# Patient Record
Sex: MALE | Race: WHITE | NOT HISPANIC OR LATINO | Employment: FULL TIME | URBAN - NONMETROPOLITAN AREA
[De-identification: names, ages, dates, MRNs, and addresses within clinical notes are randomized per-mention and may not be internally consistent; named-entity substitution may affect disease eponyms.]

---

## 2019-02-20 ENCOUNTER — OFFICE VISIT (OUTPATIENT)
Dept: UROLOGY | Facility: CLINIC | Age: 34
End: 2019-02-20

## 2019-02-20 VITALS — BODY MASS INDEX: 25.18 KG/M2 | HEIGHT: 69 IN | WEIGHT: 170 LBS

## 2019-02-20 DIAGNOSIS — Z30.09 ENCOUNTER FOR VASECTOMY COUNSELING: Primary | ICD-10-CM

## 2019-02-20 PROCEDURE — 99202 OFFICE O/P NEW SF 15 MIN: CPT | Performed by: UROLOGY

## 2019-02-20 RX ORDER — ALPRAZOLAM 1 MG/1
TABLET ORAL
Qty: 1 TABLET | Refills: 0 | OUTPATIENT
Start: 2019-02-20 | End: 2020-10-17

## 2019-02-20 NOTE — PROGRESS NOTES
Chief Complaint:          Vasectomy    HPI:   33 y.o. male.  Pt has 3 kids and is interested in vasectomy.  I discussed the risks and benefits of vasectomy.  Risks discussed infection, bleeding, pain and recanalization.  HPI      Past Medical History:      History reviewed. No pertinent past medical history.      Current Meds:     No current outpatient medications on file.     No current facility-administered medications for this visit.         Allergies:      No Known Allergies     Past Surgical History:     History reviewed. No pertinent surgical history.      Social History:     Social History     Socioeconomic History   • Marital status:      Spouse name: Not on file   • Number of children: Not on file   • Years of education: Not on file   • Highest education level: Not on file   Social Needs   • Financial resource strain: Not on file   • Food insecurity - worry: Not on file   • Food insecurity - inability: Not on file   • Transportation needs - medical: Not on file   • Transportation needs - non-medical: Not on file   Occupational History   • Not on file   Tobacco Use   • Smoking status: Never Smoker   • Smokeless tobacco: Current User     Types: Snuff   Substance and Sexual Activity   • Alcohol use: Yes     Comment: Occ.    • Drug use: No   • Sexual activity: Yes   Other Topics Concern   • Not on file   Social History Narrative   • Not on file       Family History:     Family History   Problem Relation Age of Onset   • Graves' disease Mother        Review of Systems:     Review of Systems   Constitutional: Negative for chills, fatigue and fever.   HENT: Negative for congestion, sinus pressure and sneezing.    Respiratory: Negative for choking and shortness of breath.    Cardiovascular: Negative for chest pain and leg swelling.   Gastrointestinal: Negative for abdominal pain, blood in stool, constipation and diarrhea.   Genitourinary: Negative for difficulty urinating, frequency and penile pain.  "  Neurological: Negative for dizziness, seizures, light-headedness and numbness.   Psychiatric/Behavioral: Negative for sleep disturbance.             I have reviewed the follow portions of the patient's history and confirmed they are accurate today:  allergies, current medications, past family history, past medical history, past social history, past surgical history, problem list and ROS  Physical Exam:     Physical Exam   Constitutional: He is oriented to person, place, and time.   HENT:   Head: Normocephalic and atraumatic.   Right Ear: External ear normal.   Left Ear: External ear normal.   Nose: Nose normal.   Mouth/Throat: Oropharynx is clear and moist.   Eyes: Conjunctivae and EOM are normal. Pupils are equal, round, and reactive to light.   Neck: Normal range of motion. Neck supple. No thyromegaly present.   Cardiovascular: Normal rate, regular rhythm, normal heart sounds and intact distal pulses.   No murmur heard.  Pulmonary/Chest: Effort normal and breath sounds normal. No respiratory distress. He has no wheezes. He has no rales. He exhibits no tenderness.   Abdominal: Soft. Bowel sounds are normal. He exhibits no distension and no mass. There is no tenderness. No hernia.   Musculoskeletal: Normal range of motion. He exhibits no edema or tenderness.   Lymphadenopathy:     He has no cervical adenopathy.   Neurological: He is alert and oriented to person, place, and time. No cranial nerve deficit. He exhibits normal muscle tone. Coordination normal.   Skin: Skin is warm. No rash noted.   Psychiatric: He has a normal mood and affect. His behavior is normal. Judgment and thought content normal.   Nursing note and vitals reviewed.      Ht 175.3 cm (69\")   Wt 77.1 kg (170 lb)   BMI 25.10 kg/m²    Procedure:         Assessment:     Encounter Diagnosis   Name Primary?   • Encounter for vasectomy counseling Yes     No orders of the defined types were placed in this encounter.      Plan:   vasectomy    Patient's " Body mass index is 25.1 kg/m². BMI is within normal parameters. No follow-up required..      Counseling was given to patient for the following topics impressions as follows: vasectomy risks. The interim medical history and current results were reviewed.  A treatment plan with follow-up was made for Encounter for vasectomy counseling [Z30.09].  This document has been electronically signed by Javier Pierre MD February 20, 2019 9:49 AM

## 2019-02-25 ENCOUNTER — PRIOR AUTHORIZATION (OUTPATIENT)
Dept: UROLOGY | Facility: CLINIC | Age: 34
End: 2019-02-25

## 2019-03-01 ENCOUNTER — PROCEDURE VISIT (OUTPATIENT)
Dept: UROLOGY | Facility: CLINIC | Age: 34
End: 2019-03-01

## 2019-03-01 VITALS — WEIGHT: 170 LBS | BODY MASS INDEX: 25.18 KG/M2 | HEIGHT: 69 IN

## 2019-03-01 DIAGNOSIS — Z30.2 ENCOUNTER FOR VASECTOMY: Primary | ICD-10-CM

## 2019-03-01 PROCEDURE — 55250 REMOVAL OF SPERM DUCT(S): CPT | Performed by: UROLOGY

## 2019-03-01 RX ORDER — CEPHALEXIN 250 MG/1
250 CAPSULE ORAL 3 TIMES DAILY
Qty: 21 CAPSULE | Refills: 0 | OUTPATIENT
Start: 2019-03-01 | End: 2020-10-17

## 2019-03-01 RX ORDER — HYDROCODONE BITARTRATE AND ACETAMINOPHEN 5; 325 MG/1; MG/1
TABLET ORAL
Qty: 20 TABLET | Refills: 0 | OUTPATIENT
Start: 2019-03-01 | End: 2020-10-17

## 2019-03-01 NOTE — PROGRESS NOTES
Chief Complaint:          vasectomy    HPI:   33 y.o. male.    HPI      Past Medical History:      History reviewed. No pertinent past medical history.      Current Meds:     Current Outpatient Medications   Medication Sig Dispense Refill   • ALPRAZolam (XANAX) 1 MG tablet Bring to office for procedure. Staff will instruct on dose and timing. 1 tablet 0     No current facility-administered medications for this visit.         Allergies:      No Known Allergies     Past Surgical History:     History reviewed. No pertinent surgical history.      Social History:     Social History     Socioeconomic History   • Marital status:      Spouse name: Not on file   • Number of children: Not on file   • Years of education: Not on file   • Highest education level: Not on file   Social Needs   • Financial resource strain: Not on file   • Food insecurity - worry: Not on file   • Food insecurity - inability: Not on file   • Transportation needs - medical: Not on file   • Transportation needs - non-medical: Not on file   Occupational History   • Not on file   Tobacco Use   • Smoking status: Never Smoker   • Smokeless tobacco: Current User     Types: Snuff   Substance and Sexual Activity   • Alcohol use: Yes     Comment: Occ.    • Drug use: No   • Sexual activity: Yes   Other Topics Concern   • Not on file   Social History Narrative   • Not on file       Family History:     Family History   Problem Relation Age of Onset   • Graves' disease Mother        Review of Systems:     Review of Systems   Constitutional: Negative for fatigue.   HENT: Negative for sinus pressure and sinus pain.    Eyes: Negative for pain and redness.   Respiratory: Negative for chest tightness.    Cardiovascular: Negative for chest pain.   Gastrointestinal: Negative for abdominal pain.   Endocrine: Negative for heat intolerance.   Genitourinary: Negative for frequency.   Musculoskeletal: Negative for back pain.   Skin: Negative for rash.  "  Allergic/Immunologic: Negative for food allergies.   Neurological: Negative for headaches.   Hematological: Does not bruise/bleed easily.   Psychiatric/Behavioral: The patient is not nervous/anxious.            Physical Exam    Ht 175.3 cm (69\")   Wt 77.1 kg (170 lb)   BMI 25.10 kg/m²    Procedure:   The procedure's risks: bleeding, infection, chronic testicular pain, and recanalization of the vas were discussed in detail; benefits, and alternatives were discussed with patient.  Written consent was obtained prior to the procedure.  30 minutes prior to the procedure, the patient was pre-medicated with Xanax 1mg.  His scrotum was prepped with Betadine and sterilely draped.  Anesthesia was a mixture of Lidocaine and Marcaine without Epinephrine.  After the patient was prepped and draped in the supine position, a penile scrotal incision in the midline allowed the vas clamp to grab the left vas and bring it up to the wound.  The sharpened hemostat of the [employer] was used to dissect out the vas on the left side.  Clips were applied to the proximal and distal portion of the vas. The left vas specimen had a clip placed for identification. The right vas segment did not have a clip for identification.  3-0 Chromic Suture was used to close the wound.    Assessment:     Encounter Diagnosis   Name Primary?   • Encounter for vasectomy Yes     No orders of the defined types were placed in this encounter.      Plan:   Rx norco and keflex return 8 weeks    This document has been electronically signed by Javier Pierre MD March 1, 2019 1:48 PM  "

## 2019-03-01 NOTE — PATIENT INSTRUCTIONS
Vasectomy, Care After  Refer to this sheet in the next few weeks. These instructions provide you with information on caring for yourself after your procedure. Your health care provider may also give you more specific instructions. Your treatment has been planned according to current medical practices, but problems sometimes occur. Call your health care provider if you have any problems or questions after your procedure.  What can I expect after the procedure?  After your procedure, it is typical to have the following:  · Slight swelling or redness or both at the surgical site.  · Mild pain or discomfort in the scrotum.  · Some oozing of blood from the cuts (incisions) made by the surgeon is normal during the first day or two after the procedure.  · Blood in the ejaculate is common and typically clears after a few days.    Follow these instructions at home:  · Only take over-the-counter or prescription medicines for pain, discomfort, or fever as directed by your health care provider.  · Avoid using nonsteroidal anti-inflammatory drugs (NSAIDs) because these can make bleeding worse.  · Apply ice to the injured area:  ? Put ice in a plastic bag.  ? Place a towel between your skin and the bag.  ? Leave the ice on for 20 minutes, 2-3 times a day.  · Avoid being active for the first 2 days after surgery.  · Wear a supporter while moving around for the first week after surgery. You may add some sterile fluffed bandages or a clean washcloth to the scrotal support if the scrotal support irritates your skin.  · Do not participate in sports or perform heavy physical labor for at least 2 weeks.  · You may have protected intercourse 7-10 days after your procedure. Remember, you are not sterile until follow-up specimens show no sperm in your ejaculate.  · Be sure to follow up with your surgeon as instructed to confirm sterility. It usually requires multiple ejaculations to clear the sperm located beyond the vasectomy site of  blockage. You will need at least two specimens showing an absence of sperm before you can resume unprotected intercourse.  Contact a health care provider if:  · You have redness, swelling, or increasing pain in the wounds or testicles (scrotum).  · You see pus coming from the wound.  · You have a fever.  · You notice a foul smell coming from the wound or dressing.  · You notice a breaking open of the stitches (suture) line or wound edges even after sutures have been removed.  · You have increased bleeding from the wounds.  Get help right away if:  · You develop a rash.  · You have difficulty breathing.  · You have any reaction or side effects to medicines given.  This information is not intended to replace advice given to you by your health care provider. Make sure you discuss any questions you have with your health care provider.  Document Released: 07/07/2006 Document Revised: 05/25/2017 Document Reviewed: 07/07/2014  [employer] Patient Education © 2018 [employer]

## 2019-03-06 ENCOUNTER — OFFICE VISIT (OUTPATIENT)
Dept: UROLOGY | Facility: CLINIC | Age: 34
End: 2019-03-06

## 2019-03-06 VITALS — BODY MASS INDEX: 25.18 KG/M2 | HEIGHT: 69 IN | WEIGHT: 170 LBS

## 2019-03-06 DIAGNOSIS — T14.8XXA HEMATOMA: Primary | ICD-10-CM

## 2019-03-06 PROCEDURE — 99024 POSTOP FOLLOW-UP VISIT: CPT | Performed by: UROLOGY

## 2019-03-06 RX ORDER — OXYCODONE HYDROCHLORIDE AND ACETAMINOPHEN 5; 325 MG/1; MG/1
TABLET ORAL
Qty: 40 TABLET | Refills: 0 | OUTPATIENT
Start: 2019-03-06 | End: 2020-10-17

## 2019-03-06 NOTE — PROGRESS NOTES
Chief Complaint:          Scrotal black and blue and swelling after vascectomy    HPI:   33 y.o. male.  The patient complains of scrotal swelling in black and blue changes going up into the groin.  Was worse over the weekend but is started going down he has a fair amount of discomfort.  HPI      Past Medical History:      History reviewed. No pertinent past medical history.      Current Meds:     Current Outpatient Medications   Medication Sig Dispense Refill   • ALPRAZolam (XANAX) 1 MG tablet Bring to office for procedure. Staff will instruct on dose and timing. 1 tablet 0   • cephalexin (KEFLEX) 250 MG capsule Take 1 capsule by mouth 3 (Three) Times a Day. 21 capsule 0   • HYDROcodone-acetaminophen (NORCO) 5-325 MG per tablet 1 to 2 Tablets Every 6 Hours as Needed for PAIN 20 tablet 0   • oxyCODONE-acetaminophen (PERCOCET) 5-325 MG per tablet 1 to 2 Tablets Every 6 Hours as needed for PAIN 40 tablet 0     No current facility-administered medications for this visit.         Allergies:      No Known Allergies     Past Surgical History:     History reviewed. No pertinent surgical history.      Social History:     Social History     Socioeconomic History   • Marital status:      Spouse name: Not on file   • Number of children: Not on file   • Years of education: Not on file   • Highest education level: Not on file   Social Needs   • Financial resource strain: Not on file   • Food insecurity - worry: Not on file   • Food insecurity - inability: Not on file   • Transportation needs - medical: Not on file   • Transportation needs - non-medical: Not on file   Occupational History   • Not on file   Tobacco Use   • Smoking status: Never Smoker   • Smokeless tobacco: Current User     Types: Snuff   Substance and Sexual Activity   • Alcohol use: Yes     Comment: Occ.    • Drug use: No   • Sexual activity: Yes   Other Topics Concern   • Not on file   Social History Narrative   • Not on file       Family History:  "    Family History   Problem Relation Age of Onset   • Graves' disease Mother        Review of Systems:     Review of Systems   Constitutional: Negative for chills, fatigue and fever.   HENT: Negative for sinus pressure and sinus pain.    Respiratory: Negative for cough.    Cardiovascular: Negative for leg swelling.   Gastrointestinal: Negative for abdominal pain, constipation and diarrhea.   Genitourinary: Positive for scrotal swelling, testicular pain and urgency. Negative for difficulty urinating and frequency.   Musculoskeletal: Negative for back pain.   Allergic/Immunologic: Negative for food allergies.   Neurological: Negative for headaches.   Psychiatric/Behavioral: The patient is nervous/anxious.          Physical Exam:     Physical Exam   Genitourinary:   Genitourinary Comments: The patient has a bilateral scrotal hematoma that is soft but tender and the ecchymosis is going up into the left groin.       Ht 175.3 cm (69\")   Wt 77.1 kg (170 lb)   BMI 25.10 kg/m²    Procedure:         Assessment:     Encounter Diagnosis   Name Primary?   • Hematoma Yes     No orders of the defined types were placed in this encounter.      Plan:   I think the patient has bilateral scrotal hematoma status post vasectomy this should resolve with conservative therapy.  I have written a prescription for Percocet to help with his pain management and he should do ice and elevation.  See him in a month provided that he continues to improve.  This document has been electronically signed by Javier Pierre MD March 6, 2019 2:23 PM  "

## 2019-03-19 ENCOUNTER — OFFICE VISIT (OUTPATIENT)
Dept: UROLOGY | Facility: CLINIC | Age: 34
End: 2019-03-19

## 2019-03-19 VITALS — HEIGHT: 69 IN | BODY MASS INDEX: 25.18 KG/M2 | WEIGHT: 170 LBS

## 2019-03-19 DIAGNOSIS — T14.8XXA HEMATOMA: Primary | ICD-10-CM

## 2019-03-19 PROCEDURE — 99024 POSTOP FOLLOW-UP VISIT: CPT | Performed by: UROLOGY

## 2019-03-19 RX ORDER — ACETAMINOPHEN 325 MG/1
325 TABLET ORAL EVERY 6 HOURS PRN
COMMUNITY
End: 2020-10-17

## 2019-03-19 RX ORDER — HYDROCODONE BITARTRATE AND ACETAMINOPHEN 5; 325 MG/1; MG/1
TABLET ORAL
Qty: 20 TABLET | Refills: 0 | OUTPATIENT
Start: 2019-03-19 | End: 2020-10-17

## 2019-03-19 NOTE — PROGRESS NOTES
Chief Complaint:          Post vasectomy hematome    HPI:   33 y.o. male.  Pt has improved considerably since he had surgery 2 weeks ago.  Last time we saw him he had soft ball size scrotal hematoma particularly extending into the left groin.  Now his swelling is about half that size but is still painful whenever he walks or stands.    HPI      Past Medical History:      History reviewed. No pertinent past medical history.      Current Meds:     Current Outpatient Medications   Medication Sig Dispense Refill   • acetaminophen (TYLENOL) 325 MG tablet Take 325 mg by mouth Every 6 (Six) Hours As Needed for Mild Pain  or Moderate Pain .     • Polyethylene Glycol 3350 (MIRALAX PO) Take  by mouth.     • ALPRAZolam (XANAX) 1 MG tablet Bring to office for procedure. Staff will instruct on dose and timing. 1 tablet 0   • cephalexin (KEFLEX) 250 MG capsule Take 1 capsule by mouth 3 (Three) Times a Day. 21 capsule 0   • HYDROcodone-acetaminophen (NORCO) 5-325 MG per tablet 1 to 2 Tablets Every 6 Hours as Needed for PAIN 20 tablet 0   • oxyCODONE-acetaminophen (PERCOCET) 5-325 MG per tablet 1 to 2 Tablets Every 6 Hours as needed for PAIN 40 tablet 0     No current facility-administered medications for this visit.         Allergies:      No Known Allergies     Past Surgical History:     History reviewed. No pertinent surgical history.      Social History:     Social History     Socioeconomic History   • Marital status:      Spouse name: Not on file   • Number of children: Not on file   • Years of education: Not on file   • Highest education level: Not on file   Social Needs   • Financial resource strain: Not on file   • Food insecurity - worry: Not on file   • Food insecurity - inability: Not on file   • Transportation needs - medical: Not on file   • Transportation needs - non-medical: Not on file   Occupational History   • Not on file   Tobacco Use   • Smoking status: Never Smoker   • Smokeless tobacco: Current User      Types: Snuff   Substance and Sexual Activity   • Alcohol use: Yes     Comment: Occ.    • Drug use: No   • Sexual activity: Yes   Other Topics Concern   • Not on file   Social History Narrative   • Not on file       Family History:     Family History   Problem Relation Age of Onset   • Graves' disease Mother        Review of Systems:     Review of Systems   Constitutional: Negative for chills, fatigue and fever.   HENT: Negative for sinus pressure and sinus pain.    Gastrointestinal: Negative for abdominal pain, constipation and diarrhea.   Genitourinary: Positive for scrotal swelling and testicular pain. Negative for decreased urine volume, dysuria, flank pain and frequency.   Musculoskeletal: Negative for back pain.   Neurological: Negative for headaches.   Psychiatric/Behavioral: The patient is not nervous/anxious.          Physical Exam:     Physical Exam   Constitutional: He is oriented to person, place, and time.   HENT:   Head: Normocephalic and atraumatic.   Right Ear: External ear normal.   Left Ear: External ear normal.   Nose: Nose normal.   Mouth/Throat: Oropharynx is clear and moist.   Eyes: Conjunctivae and EOM are normal. Pupils are equal, round, and reactive to light.   Neck: Normal range of motion. Neck supple. No thyromegaly present.   Cardiovascular: Normal rate, regular rhythm, normal heart sounds and intact distal pulses.   No murmur heard.  Pulmonary/Chest: Effort normal and breath sounds normal. No respiratory distress. He has no wheezes. He has no rales. He exhibits no tenderness.   Abdominal: Soft. Bowel sounds are normal. He exhibits no distension and no mass. There is no tenderness. No hernia.   Genitourinary: Rectum normal, prostate normal and penis normal.   Genitourinary Comments: Pt has about half the size of his maximal scrotal hematoma.   Musculoskeletal: Normal range of motion. He exhibits no edema or tenderness.   Lymphadenopathy:     He has no cervical adenopathy.   Neurological:  "He is alert and oriented to person, place, and time. No cranial nerve deficit. He exhibits normal muscle tone. Coordination normal.   Skin: Skin is warm. No rash noted.   Psychiatric: He has a normal mood and affect. His behavior is normal. Judgment and thought content normal.   Nursing note and vitals reviewed.      Ht 175.3 cm (69\")   Wt 77.1 kg (170 lb)   BMI 25.10 kg/m²    Procedure:         Assessment:     Encounter Diagnosis   Name Primary?   • Hematoma Yes     No orders of the defined types were placed in this encounter.      Plan:   Pt and I discussed how he has a legitimate cause for pain with his hematoma but we have to be careful about getting habituated to the pain medication.  He and I agree that he will use it sparingly.  Will see him again in 6 weeks.  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     Patient's Body mass index is 25.1 kg/m². BMI is within normal parameters. No follow-up required..      Counseling was given to patient for the following topics instructions for management as follows: hematome. The interim medical history and current results were reviewed.  A treatment plan with follow-up was made for Hematoma [T14.8XXA].  This document has been electronically signed by Javier Pierre MD March 19, 2019 10:05 AM  "

## 2019-11-19 PROCEDURE — 84153 ASSAY OF PSA TOTAL: CPT | Performed by: UROLOGY

## 2019-11-19 PROCEDURE — 82670 ASSAY OF TOTAL ESTRADIOL: CPT | Performed by: UROLOGY

## 2019-11-19 PROCEDURE — 85027 COMPLETE CBC AUTOMATED: CPT | Performed by: UROLOGY

## 2019-11-19 PROCEDURE — 84403 ASSAY OF TOTAL TESTOSTERONE: CPT | Performed by: UROLOGY

## 2021-03-18 ENCOUNTER — BULK ORDERING (OUTPATIENT)
Dept: CASE MANAGEMENT | Facility: OTHER | Age: 36
End: 2021-03-18

## 2021-03-18 DIAGNOSIS — Z23 IMMUNIZATION DUE: ICD-10-CM

## 2023-02-01 ENCOUNTER — TRANSCRIBE ORDERS (OUTPATIENT)
Dept: PHYSICAL THERAPY | Facility: CLINIC | Age: 38
End: 2023-02-01
Payer: OTHER GOVERNMENT

## 2023-02-01 DIAGNOSIS — M54.50 LOW BACK PAIN, UNSPECIFIED BACK PAIN LATERALITY, UNSPECIFIED CHRONICITY, UNSPECIFIED WHETHER SCIATICA PRESENT: Primary | ICD-10-CM

## 2023-02-13 ENCOUNTER — TREATMENT (OUTPATIENT)
Dept: PHYSICAL THERAPY | Facility: CLINIC | Age: 38
End: 2023-02-13
Payer: OTHER GOVERNMENT

## 2023-02-13 DIAGNOSIS — G89.29 CHRONIC RIGHT SHOULDER PAIN: ICD-10-CM

## 2023-02-13 DIAGNOSIS — M25.511 CHRONIC RIGHT SHOULDER PAIN: ICD-10-CM

## 2023-02-13 DIAGNOSIS — M54.41 CHRONIC BILATERAL LOW BACK PAIN WITH RIGHT-SIDED SCIATICA: Primary | ICD-10-CM

## 2023-02-13 DIAGNOSIS — G89.29 CHRONIC BILATERAL LOW BACK PAIN WITH RIGHT-SIDED SCIATICA: Primary | ICD-10-CM

## 2023-02-13 PROCEDURE — 97162 PT EVAL MOD COMPLEX 30 MIN: CPT | Performed by: PHYSICAL THERAPIST

## 2023-02-13 NOTE — PROGRESS NOTES
"    Physical Therapy Initial Evaluation and Plan of Care    Patient: Teo Griffith   : 1985  Diagnosis/ICD-10 Code:  Chronic bilateral low back pain with right-sided sciatica [M54.41, G89.29]  Referring practitioner: Alexis Sanchez MD  Date of Initial Visit: 2023  Today's Date: 2023  Patient seen for 1 session         Visit Diagnoses:    ICD-10-CM ICD-9-CM   1. Chronic bilateral low back pain with right-sided sciatica  M54.41 724.2    G89.29 724.3     338.29   2. Chronic right shoulder pain  M25.511 719.41    G89.29 338.29         Subjective Questionnaire: Oswestry: 52%; Quick Dash 36%      Subjective Evaluation    History of Present Illness  Onset date: 2022.  Mechanism of injury: While performing a ACFT test in 2022,  the patient was throwing a 10 pound medicine ball overhead that resulted in a right shoulder injury.  The patient experienced a \"pop\" that included immediate pain and weakness in the shoulder.  The patient later received an MRI of the shoulder that demonstrated a tear.  The patient was refererred to [facility] in [place], [place], most recently, and was given a cortisone shot in the shoulder with poor response.   During the same ACFT test in 2022, the patient strained his back while performing a dead lift.  The patient received an MRI of the lower back that demonstrated four disc bulges in the lumbar spine. The patient has now been referred to therapy for treatment of pain.      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;  transportation Quality of life: good    Pain  Current pain ratin  At best pain rating: 3  At worst pain ratin  Location: back and right shoulder  Quality: dull ache, needle-like and sharp  Relieving factors: rest, medications and ice  Aggravating factors: lifting, movement, overhead activity, ambulation, outstretched reach, repetitive movement and squatting  Progression: no change    Hand dominance: right    Diagnostic Tests  MRI studies: " abnormal    Patient Goals  Patient goals for therapy: decreased pain, increased motion, increased strength, independence with ADLs/IADLs, return to sport/leisure activities and return to work             Objective          Postural Observations    Additional Postural Observation Details  Left list noted; increased wb on left  Decreased lumbar lordosis  Elevated right shoulder  No winging    Palpation     Right Tenderness of the supraspinatus.     Additional Palpation Details  Tender along T7 and L1 region; right glut    Neurological Testing     Sensation     Shoulder   Left Shoulder   Intact: light touch    Right Shoulder   Intact: light touch    Lumbar   Left   Intact: light touch    Right   Intact: light touch    Reflexes   Left   Patellar (L4): normal (2+)    Right   Patellar (L4): normal (2+)    Active Range of Motion   Left Shoulder   Flexion: WFL  Abduction: WFL    Right Shoulder   Flexion: 135 degrees   Abduction: 110 degrees   External rotation 90°: 30 degrees  Internal rotation 90°: 30 degrees     Lumbar   Flexion: 50 degrees   Extension: 50 degrees   Left rotation: 75 degrees   Right rotation: 75 degrees     Strength/Myotome Testing     Left Shoulder     Planes of Motion   Flexion: 4+   Abduction: 4+   External rotation at 0°: 4+   Internal rotation at 0°: 4+     Isolated Muscles   Biceps: 4+   Triceps: 4+     Right Shoulder     Planes of Motion   Flexion: 4-   Abduction: 4-   External rotation at 0°: 4-   Internal rotation at 0°: 4+     Isolated Muscles   Biceps: 4+   Triceps: 4-     Left Hip   Planes of Motion   Flexion: 4+    Right Hip   Planes of Motion   Flexion: 4-    Left Knee   Flexion: 4+  Extension: 4+    Right Knee   Flexion: 4-  Extension: 4-    Tests     Right Shoulder   Positive empty can, Hawkin's and Speed's.   Negative anterior load and shift and lift-off.     Additional Tests Details  Positive right slump test          Assessment & Plan     Assessment  Impairments: abnormal muscle firing,  abnormal or restricted ROM, activity intolerance, impaired physical strength, lacks appropriate home exercise program and pain with function  Functional Limitations: carrying objects, lifting, sleeping, pulling, pushing, uncomfortable because of pain, stooping, reaching behind back and reaching overhead  Assessment details: Pt is a 38 y/o male referred to therapy for treatment of back and right shoulder pain.  Pt presents with symptoms consistent with a posterior lumbar disc derangement and a right rotator cuff injury.  Therapy will follow for improved centralization of symptoms and decreased pain.  Prognosis: good    Goals  Plan Goals: STG 6 weeks    1 Pt will be instructed in a HEP.  2 Pt will report a 50% decrease in radicular symptoms.  3 Pt will demonstrate 160 degree of arom shoulder flexion.    LTG 12 weeks    1 Pt will improve his Mod Oswestry to less than 20%.  2 Pt will report back pain no greater than 3/10 with moderate lifting.  3 Pt will demonstrate 4/5 right shoulder gross strength.  4 Pt will improve his Quick Dash score to less than 15%.    Plan  Therapy options: will be seen for skilled therapy services  Planned modality interventions: cryotherapy, TENS, thermotherapy (hydrocollator packs), traction and ultrasound  Planned therapy interventions: abdominal trunk stabilization, ADL retraining, body mechanics training, flexibility, functional ROM exercises, home exercise program, IADL retraining, joint mobilization, manual therapy, neuromuscular re-education, postural training, soft tissue mobilization, strengthening, stretching and therapeutic activities  Frequency: 2x week  Duration in weeks: 8  Treatment plan discussed with: patient  Plan details: Will follow for optimal gains.  Moderate Evaluation  59215  Re-evaluation   07503  Therapeutic exercise  53570  Therapeutic activity    24473  Neuromuscular re-education   86751  Manual therapy   49123  Gait training  22007  Unattended e-stim  (Medicaid/Medicare)     Moist heat/cryotherapy 68813   Ultrasound   34538  Mechanical traction 90204            Timed:         Manual Therapy:         mins  41305;     Therapeutic Exercise:         mins  21795;     Neuromuscular Ruth:        mins  24871;    Therapeutic Activity:          mins  62242;     Gait Training:           mins  62392;     Ultrasound:          mins  66207;    Ionto                                   mins   92347  Self Care                            mins   49252  Canalith Repos         mins 25176      Un-Timed:  Electrical Stimulation:         mins  08188 ( );  Dry Needling          mins self-pay  Traction          mins 78451  Low Eval          Mins  45019  Mod Eval     40     Mins  45162  High Eval                            Mins  25078        Timed Treatment:      mins   Total Treatment:     40   mins          PT: Tim Jackson PT     License Number: [id number]  Electronically signed by Tim Jackson PT, 02/13/23, 11:12 AM EST    Certification Period: 2/13/2023 thru 5/13/2023  I certify that the therapy services are furnished while this patient is under my care.  The services outlined above are required by this patient, and will be reviewed every 90 days.         Physician Signature:__________________________________________________    PHYSICIAN: Alexis Sanchez MD  NPI: [id number]                                      DATE:      Please sign and return via fax to [email or web address] . Thank you, [facility].

## 2023-02-15 ENCOUNTER — TREATMENT (OUTPATIENT)
Dept: PHYSICAL THERAPY | Facility: CLINIC | Age: 38
End: 2023-02-15
Payer: OTHER GOVERNMENT

## 2023-02-15 DIAGNOSIS — M54.41 CHRONIC BILATERAL LOW BACK PAIN WITH RIGHT-SIDED SCIATICA: Primary | ICD-10-CM

## 2023-02-15 DIAGNOSIS — M25.511 CHRONIC RIGHT SHOULDER PAIN: ICD-10-CM

## 2023-02-15 DIAGNOSIS — G89.29 CHRONIC BILATERAL LOW BACK PAIN WITH RIGHT-SIDED SCIATICA: Primary | ICD-10-CM

## 2023-02-15 DIAGNOSIS — G89.29 CHRONIC RIGHT SHOULDER PAIN: ICD-10-CM

## 2023-02-15 PROCEDURE — 97110 THERAPEUTIC EXERCISES: CPT | Performed by: PHYSICAL THERAPIST

## 2023-02-15 PROCEDURE — G0283 ELEC STIM OTHER THAN WOUND: HCPCS | Performed by: PHYSICAL THERAPIST

## 2023-02-15 PROCEDURE — 97140 MANUAL THERAPY 1/> REGIONS: CPT | Performed by: PHYSICAL THERAPIST

## 2023-02-15 NOTE — PROGRESS NOTES
Physical Therapy Daily Treatment Note      Patient: Teo Griffith   : 1985  Referring practitioner: Alexis Sanchez MD  Date of Initial Visit: Type: THERAPY  Noted: 2023  Today's Date: 2/15/2023  Patient seen for 2 sessions       Visit Diagnoses:    ICD-10-CM ICD-9-CM   1. Chronic bilateral low back pain with right-sided sciatica  M54.41 724.2    G89.29 724.3     338.29   2. Chronic right shoulder pain  M25.511 719.41    G89.29 338.29       Subjective Evaluation    History of Present Illness    Subjective comment: Pt has 5/10 back pain today.       Objective   See Exercise, Manual, and Modality Logs for complete treatment.       Assessment & Plan     Assessment    Assessment details: Tx today consisted of exercises for core stability and centralization of symptoms; followed by stm and PA glides to lumbar region and ended with ice and estim to right shoulder for decreased pain.  Pt required cues for postural TB exercises.  Pt did report 3/10 post pain today.    Plan  Plan details: Will follow progressing core stability and improved functional mobility.          Timed:         Manual Therapy:    12     mins  62587;     Therapeutic Exercise:    27     mins  40873;     Neuromuscular Ruth:        mins  61436;    Therapeutic Activity:          mins  00187;     Gait Training:           mins  66785;     Ultrasound:          mins  77152;    Ionto                                   mins   92713  Self Care                            mins   47459  Canalith Repos         mins 49969      Un-Timed:  Electrical Stimulation:    10     mins  96331 ( );  Dry Needling          mins self-pay  Traction          mins 59280      Timed Treatment:   39   mins   Total Treatment:     49   mins    Tim Jackson PT  [place] License: [id number]      Electronically signed by Tim Jackson PT, 02/15/23, 10:04 AM EST

## 2023-02-20 ENCOUNTER — TREATMENT (OUTPATIENT)
Dept: PHYSICAL THERAPY | Facility: CLINIC | Age: 38
End: 2023-02-20
Payer: OTHER GOVERNMENT

## 2023-02-20 DIAGNOSIS — G89.29 CHRONIC BILATERAL LOW BACK PAIN WITH RIGHT-SIDED SCIATICA: Primary | ICD-10-CM

## 2023-02-20 DIAGNOSIS — M54.41 CHRONIC BILATERAL LOW BACK PAIN WITH RIGHT-SIDED SCIATICA: Primary | ICD-10-CM

## 2023-02-20 DIAGNOSIS — G89.29 CHRONIC RIGHT SHOULDER PAIN: ICD-10-CM

## 2023-02-20 DIAGNOSIS — M25.511 CHRONIC RIGHT SHOULDER PAIN: ICD-10-CM

## 2023-02-20 PROCEDURE — G0283 ELEC STIM OTHER THAN WOUND: HCPCS | Performed by: PHYSICAL THERAPIST

## 2023-02-20 PROCEDURE — 97110 THERAPEUTIC EXERCISES: CPT | Performed by: PHYSICAL THERAPIST

## 2023-02-20 PROCEDURE — 97140 MANUAL THERAPY 1/> REGIONS: CPT | Performed by: PHYSICAL THERAPIST

## 2023-02-20 NOTE — PROGRESS NOTES
Physical Therapy Daily Treatment Note      Patient: Teo Griffith   : 1985  Referring practitioner: Alexis Sanchez MD  Date of Initial Visit: Type: THERAPY  Noted: 2023  Today's Date: 2023  Patient seen for 3 sessions       Visit Diagnoses:    ICD-10-CM ICD-9-CM   1. Chronic bilateral low back pain with right-sided sciatica  M54.41 724.2    G89.29 724.3     338.29   2. Chronic right shoulder pain  M25.511 719.41    G89.29 338.29       Subjective Evaluation    History of Present Illness    Subjective comment: The patient sprained his left ankle last Wednesday.  The patient reports having 3/10 pain  in back and right shoulder today.       Objective   See Exercise, Manual, and Modality Logs for complete treatment.       Assessment & Plan     Assessment    Assessment details: Tx today consisted of shoulder and lumbar mobility and stability exercises increased to 25 reps; followed by prom of right shoulder; stm to lower back and ended with ice and e-stim to right shoulder.  Pt reported 3/10 post pain and responded well to added reps.    Plan  Plan details: Will follow progressing core stability and decreased pain.          Timed:         Manual Therapy:   12      mins  84037;     Therapeutic Exercise:    33     mins  46189;     Neuromuscular Ruth:        mins  54819;    Therapeutic Activity:          mins  39025;     Gait Training:           mins  54673;     Ultrasound:          mins  57103;    Ionto                                   mins   94069  Self Care                            mins   72810  Canalith Repos         mins 91610      Un-Timed:  Electrical Stimulation:    10     mins  94556 ( );  Dry Needling          mins self-pay  Traction          mins 25037      Timed Treatment:   45   mins   Total Treatment:     55   mins    Tim Jackson PT  [place] License: [id number]      Electronically signed by Tim Jackson PT, 23, 10:09 AM EST

## 2023-02-22 ENCOUNTER — TREATMENT (OUTPATIENT)
Dept: PHYSICAL THERAPY | Facility: CLINIC | Age: 38
End: 2023-02-22
Payer: OTHER GOVERNMENT

## 2023-02-22 DIAGNOSIS — M25.511 CHRONIC RIGHT SHOULDER PAIN: ICD-10-CM

## 2023-02-22 DIAGNOSIS — G89.29 CHRONIC BILATERAL LOW BACK PAIN WITH RIGHT-SIDED SCIATICA: Primary | ICD-10-CM

## 2023-02-22 DIAGNOSIS — M54.41 CHRONIC BILATERAL LOW BACK PAIN WITH RIGHT-SIDED SCIATICA: Primary | ICD-10-CM

## 2023-02-22 DIAGNOSIS — G89.29 CHRONIC RIGHT SHOULDER PAIN: ICD-10-CM

## 2023-02-22 PROCEDURE — 97140 MANUAL THERAPY 1/> REGIONS: CPT | Performed by: PHYSICAL THERAPIST

## 2023-02-22 PROCEDURE — G0283 ELEC STIM OTHER THAN WOUND: HCPCS | Performed by: PHYSICAL THERAPIST

## 2023-02-22 PROCEDURE — 97110 THERAPEUTIC EXERCISES: CPT | Performed by: PHYSICAL THERAPIST

## 2023-02-22 NOTE — PROGRESS NOTES
Physical Therapy Daily Treatment Note      Patient: Teo Griffith   : 1985  Referring practitioner: Alexis Sanchez MD  Date of Initial Visit: Type: THERAPY  Noted: 2023  Today's Date: 2023  Patient seen for 4 sessions       Visit Diagnoses:    ICD-10-CM ICD-9-CM   1. Chronic bilateral low back pain with right-sided sciatica  M54.41 724.2    G89.29 724.3     338.29   2. Chronic right shoulder pain  M25.511 719.41    G89.29 338.29       Subjective Evaluation    History of Present Illness    Subjective comment: Pt has 4/10 pain today.  The patient is scheduled for back injections next week.       Objective   See Exercise, Manual, and Modality Logs for complete treatment.       Assessment & Plan     Assessment    Assessment details: Tx today consisted of exercises progressed with increased reps; followed by prom to right shld; stm to lower back in prone and ended with ice and estim to right shld and ice to back.  Pt responded well to added reps and added lat rows today, yet reported similar post pain.  Pt reports walking differently on sprained ankle may contribute to back pain.    Plan  Plan details: Will follow progressing core stability and decreased pain.          Timed:         Manual Therapy:    12     mins  82150;     Therapeutic Exercise:    33     mins  58811;     Neuromuscular Ruth:        mins  44213;    Therapeutic Activity:          mins  14005;     Gait Training:           mins  37941;     Ultrasound:          mins  20753;    Ionto                                   mins   90356  Self Care                            mins   77839  Canalith Repos         mins 99271      Un-Timed:  Electrical Stimulation:    10     mins  77401 ( );  Dry Needling          mins self-pay  Traction          mins 42178      Timed Treatment:   45   mins   Total Treatment:     55   mins    Tim Jackson PT  [place] License: [id number]      Electronically signed by Tim Jackson PT, 23, 9:12  AM EST

## 2023-02-27 ENCOUNTER — TREATMENT (OUTPATIENT)
Dept: PHYSICAL THERAPY | Facility: CLINIC | Age: 38
End: 2023-02-27
Payer: OTHER GOVERNMENT

## 2023-02-27 DIAGNOSIS — G89.29 CHRONIC RIGHT SHOULDER PAIN: ICD-10-CM

## 2023-02-27 DIAGNOSIS — G89.29 CHRONIC BILATERAL LOW BACK PAIN WITH RIGHT-SIDED SCIATICA: Primary | ICD-10-CM

## 2023-02-27 DIAGNOSIS — M54.41 CHRONIC BILATERAL LOW BACK PAIN WITH RIGHT-SIDED SCIATICA: Primary | ICD-10-CM

## 2023-02-27 DIAGNOSIS — M25.511 CHRONIC RIGHT SHOULDER PAIN: ICD-10-CM

## 2023-02-27 PROCEDURE — 97110 THERAPEUTIC EXERCISES: CPT | Performed by: PHYSICAL THERAPIST

## 2023-02-27 PROCEDURE — 97140 MANUAL THERAPY 1/> REGIONS: CPT | Performed by: PHYSICAL THERAPIST

## 2023-02-27 PROCEDURE — G0283 ELEC STIM OTHER THAN WOUND: HCPCS | Performed by: PHYSICAL THERAPIST

## 2023-02-27 NOTE — PROGRESS NOTES
Physical Therapy Daily Treatment Note      Patient: Teo Griffith   : 1985  Referring practitioner: Alexis Sanchez MD  Date of Initial Visit: Type: THERAPY  Noted: 2023  Today's Date: 2023  Patient seen for 5 sessions       Visit Diagnoses:    ICD-10-CM ICD-9-CM   1. Chronic bilateral low back pain with right-sided sciatica  M54.41 724.2    G89.29 724.3     338.29   2. Chronic right shoulder pain  M25.511 719.41    G89.29 338.29       Subjective Evaluation    History of Present Illness    Subjective comment: Pt reports having 3/10 pain today.       Objective   See Exercise, Manual, and Modality Logs for complete treatment.       Assessment & Plan     Assessment    Assessment details: Tx today consisted of postural and shoulder exercises; followed by lumbar exercises for centralization; stm to lower back for decreased pain and ended with ice and estim for decreased pain.  Pt reported similar post pain today and demonstrated no distress with exercises.      Plan  Plan details: Will follow progressing centralization of symptoms and decreased pain in back and shoulder.          Timed:         Manual Therapy:    12     mins  19312;     Therapeutic Exercise:    31     mins  03141;     Neuromuscular Ruth:        mins  52091;    Therapeutic Activity:          mins  95368;     Gait Training:           mins  23329;     Ultrasound:          mins  47030;    Ionto                                   mins   91520  Self Care                            mins   40014  Canalith Repos         mins 78990      Un-Timed:  Electrical Stimulation:    10     mins  99056 ( );  Dry Needling          mins self-pay  Traction          mins 72982      Timed Treatment:   43   mins   Total Treatment:     53   mins    Tim Jackson PT  [place] License: [id number]      Electronically signed by Tim Jackson PT, 23, 10:03 AM EST

## 2023-03-01 ENCOUNTER — TREATMENT (OUTPATIENT)
Dept: PHYSICAL THERAPY | Facility: CLINIC | Age: 38
End: 2023-03-01
Payer: OTHER GOVERNMENT

## 2023-03-01 DIAGNOSIS — M25.511 CHRONIC RIGHT SHOULDER PAIN: ICD-10-CM

## 2023-03-01 DIAGNOSIS — G89.29 CHRONIC RIGHT SHOULDER PAIN: ICD-10-CM

## 2023-03-01 DIAGNOSIS — M54.41 CHRONIC BILATERAL LOW BACK PAIN WITH RIGHT-SIDED SCIATICA: Primary | ICD-10-CM

## 2023-03-01 DIAGNOSIS — G89.29 CHRONIC BILATERAL LOW BACK PAIN WITH RIGHT-SIDED SCIATICA: Primary | ICD-10-CM

## 2023-03-01 PROCEDURE — 97140 MANUAL THERAPY 1/> REGIONS: CPT | Performed by: PHYSICAL THERAPIST

## 2023-03-01 PROCEDURE — G0283 ELEC STIM OTHER THAN WOUND: HCPCS | Performed by: PHYSICAL THERAPIST

## 2023-03-01 PROCEDURE — 97110 THERAPEUTIC EXERCISES: CPT | Performed by: PHYSICAL THERAPIST

## 2023-03-01 NOTE — PROGRESS NOTES
Physical Therapy Daily Treatment Note      Patient: Teo Griffith   : 1985  Referring practitioner: Alexis Sanchez MD  Date of Initial Visit: Type: THERAPY  Noted: 2023  Today's Date: 3/1/2023  Patient seen for 6 sessions       Visit Diagnoses:    ICD-10-CM ICD-9-CM   1. Chronic bilateral low back pain with right-sided sciatica  M54.41 724.2    G89.29 724.3     338.29   2. Chronic right shoulder pain  M25.511 719.41    G89.29 338.29       Subjective Evaluation    History of Present Illness    Subjective comment: Pt reports having 3/10 shoulder and 5/10 back pain.       Objective   See Exercise, Manual, and Modality Logs for complete treatment.       Assessment & Plan     Assessment    Assessment details: Tx today consisted of exercises for shoulder mobility, postural staiblity and centralization of symptoms and core stability; followed by stm to lower back and ended with mh and estim to right shld and mh to lower back.  Pt demonstrated good effort and reported no change in pain post tx.    Plan  Plan details: Will follow progressing mobility with decreased pain.          Timed:         Manual Therapy:    12     mins  57155;     Therapeutic Exercise:    33     mins  16078;     Neuromuscular Ruth:        mins  36395;    Therapeutic Activity:          mins  99170;     Gait Training:           mins  29625;     Ultrasound:          mins  54202;    Ionto                                   mins   75500  Self Care                            mins   70010  Canalith Repos         mins 10465      Un-Timed:  Electrical Stimulation:    10     mins  73317 ( );  Dry Needling          mins self-pay  Traction          mins 32943      Timed Treatment: 45     mins   Total Treatment:     55   mins    Tim Jackson PT  [place] License: [id number]      Electronically signed by Tim Jackson PT, 23, 10:09 AM EST

## 2023-03-06 ENCOUNTER — TREATMENT (OUTPATIENT)
Dept: PHYSICAL THERAPY | Facility: CLINIC | Age: 38
End: 2023-03-06
Payer: OTHER GOVERNMENT

## 2023-03-06 DIAGNOSIS — M25.511 CHRONIC RIGHT SHOULDER PAIN: ICD-10-CM

## 2023-03-06 DIAGNOSIS — G89.29 CHRONIC BILATERAL LOW BACK PAIN WITH RIGHT-SIDED SCIATICA: Primary | ICD-10-CM

## 2023-03-06 DIAGNOSIS — G89.29 CHRONIC RIGHT SHOULDER PAIN: ICD-10-CM

## 2023-03-06 DIAGNOSIS — M54.41 CHRONIC BILATERAL LOW BACK PAIN WITH RIGHT-SIDED SCIATICA: Primary | ICD-10-CM

## 2023-03-06 PROCEDURE — 97110 THERAPEUTIC EXERCISES: CPT | Performed by: PHYSICAL THERAPIST

## 2023-03-06 PROCEDURE — G0283 ELEC STIM OTHER THAN WOUND: HCPCS | Performed by: PHYSICAL THERAPIST

## 2023-03-06 PROCEDURE — 97140 MANUAL THERAPY 1/> REGIONS: CPT | Performed by: PHYSICAL THERAPIST

## 2023-03-06 NOTE — PROGRESS NOTES
Physical Therapy Daily Treatment Note    Patient: Teo Griffith   : 1985  Diagnosis/ICD-10 Code:  Chronic bilateral low back pain with right-sided sciatica [M54.41, G89.29]  Referring practitioner: Alexis Sanchez MD  Date of Initial Visit: Type: THERAPY  Noted: 2023  Today's Date: 3/6/2023  Patient seen for 7 sessions           Subjective Pt arrives today and reports increased pain in back 6/10 due to riding in car with family on a trip for 3.5 hours this weekend.  He states he is having increased right LE radicular pain and his leg gives away at times.  He reports 3/10 pain in shoulder.      Objective   See Exercise, Manual, and Modality Logs for complete treatment.       Assessment details: Tx today consisted of exercises for shoulder mobility as well as lumbar exercises for postural staiblity and centralization of symptoms and core stability; Today added prone lying activities with pillow under belly and progression to prone without pillow and reported pain centralized to back.  Prone lying followed by stm to lower back and ended with ice and estim to lower back.  Pt demonstrated good effort and reported slight decrease in pain post session to 5/10 back.    Plan  Plan details: Will follow progressing mobility with decreased pain.            Timed:                                                                           Manual Therapy:            12     mins  43083;                  Therapeutic Exercise:    35     mins  37932;     Neuromuscular Ruth:        mins  62460;    Therapeutic Activity:           mins  38140;     Gait Training:                      mins  76178;     Ultrasound:                          mins  91486;    Ionto                                   mins   30374  Self Care                            mins   70766  Canalith Repos                   mins 30573        Un-Timed:  Electrical Stimulation:    10     mins  51234 ( );  Dry Needling                       mins  self-pay  Traction                               mins 14538       Timed Treatment:   47  mins   Total Treatment:     57   mins    Progress strengthening /stabilization /functional activity           Electronically signed by:  Betsy Jackson, PT  Physical Therapist  [place] [id number]    Referring MD:  Alexis Sanchez MD  [address] [place] [address],  [place] [address]

## 2023-03-08 ENCOUNTER — TREATMENT (OUTPATIENT)
Dept: PHYSICAL THERAPY | Facility: CLINIC | Age: 38
End: 2023-03-08
Payer: OTHER GOVERNMENT

## 2023-03-08 DIAGNOSIS — M54.41 CHRONIC BILATERAL LOW BACK PAIN WITH RIGHT-SIDED SCIATICA: Primary | ICD-10-CM

## 2023-03-08 DIAGNOSIS — G89.29 CHRONIC BILATERAL LOW BACK PAIN WITH RIGHT-SIDED SCIATICA: Primary | ICD-10-CM

## 2023-03-08 DIAGNOSIS — M25.511 CHRONIC RIGHT SHOULDER PAIN: ICD-10-CM

## 2023-03-08 DIAGNOSIS — G89.29 CHRONIC RIGHT SHOULDER PAIN: ICD-10-CM

## 2023-03-08 PROCEDURE — 97110 THERAPEUTIC EXERCISES: CPT | Performed by: PHYSICAL THERAPIST

## 2023-03-08 PROCEDURE — 97140 MANUAL THERAPY 1/> REGIONS: CPT | Performed by: PHYSICAL THERAPIST

## 2023-03-08 PROCEDURE — G0283 ELEC STIM OTHER THAN WOUND: HCPCS | Performed by: PHYSICAL THERAPIST

## 2023-03-08 NOTE — PROGRESS NOTES
Physical Therapy Daily Treatment Note      Patient: Teo Griffith   : 1985  Referring practitioner: Alexis Sanchez MD  Date of Initial Visit: Type: THERAPY  Noted: 2023  Today's Date: 3/8/2023  Patient seen for 8 sessions       Visit Diagnoses:    ICD-10-CM ICD-9-CM   1. Chronic bilateral low back pain with right-sided sciatica  M54.41 724.2    G89.29 724.3     338.29   2. Chronic right shoulder pain  M25.511 719.41    G89.29 338.29       Subjective Evaluation    History of Present Illness    Subjective comment: Pt has 3/10 back and 2/10 shoulder pain today.       Objective   See Exercise, Manual, and Modality Logs for complete treatment.       Assessment & Plan     Assessment    Assessment details: Tx today consisted of exercises for improved shoulder mobility and stability; postural and core stability and centralization of symptoms; followed by stm to lower back and ended with ice and estim to shoulder and mh to back. Pt had 3/10 shld and 2/10 back pian post tx.    Plan  Plan details: Will follow progressing stability and decreased pain.          Timed:         Manual Therapy:    12     mins  09769;     Therapeutic Exercise:    27     mins  85686;     Neuromuscular Ruth:        mins  50122;    Therapeutic Activity:          mins  82761;     Gait Training:           mins  62799;     Ultrasound:          mins  93324;    Ionto                                   mins   97454  Self Care                            mins   98217  Canalith Repos         mins 47885      Un-Timed:  Electrical Stimulation:    10     mins  81805 ( );  Dry Needling          mins self-pay  Traction          mins 12056      Timed Treatment:   39   mins   Total Treatment:     49   mins    Tim Jackson PT  [place] License: [id number]      Electronically signed by Tim Jackson PT, 23, 10:07 AM EST

## 2023-03-13 ENCOUNTER — TREATMENT (OUTPATIENT)
Dept: PHYSICAL THERAPY | Facility: CLINIC | Age: 38
End: 2023-03-13
Payer: OTHER GOVERNMENT

## 2023-03-13 DIAGNOSIS — G89.29 CHRONIC BILATERAL LOW BACK PAIN WITH RIGHT-SIDED SCIATICA: Primary | ICD-10-CM

## 2023-03-13 DIAGNOSIS — M54.41 CHRONIC BILATERAL LOW BACK PAIN WITH RIGHT-SIDED SCIATICA: Primary | ICD-10-CM

## 2023-03-13 DIAGNOSIS — G89.29 CHRONIC RIGHT SHOULDER PAIN: ICD-10-CM

## 2023-03-13 DIAGNOSIS — M25.511 CHRONIC RIGHT SHOULDER PAIN: ICD-10-CM

## 2023-03-13 PROCEDURE — 97110 THERAPEUTIC EXERCISES: CPT | Performed by: PHYSICAL THERAPIST

## 2023-03-13 PROCEDURE — 97140 MANUAL THERAPY 1/> REGIONS: CPT | Performed by: PHYSICAL THERAPIST

## 2023-03-13 PROCEDURE — G0283 ELEC STIM OTHER THAN WOUND: HCPCS | Performed by: PHYSICAL THERAPIST

## 2023-03-13 NOTE — PROGRESS NOTES
Physical Therapy Re Certification Of Plan of Care  Patient: Teo Griffith   : 1985  Diagnosis/ICD-10 Code:  Chronic bilateral low back pain with right-sided sciatica [M54.41, G89.29]  Referring practitioner: Alexis Sanchez MD  Date of Initial Visit: Type: THERAPY  Noted: 2023  Today's Date: 3/13/2023  Patient seen for 9 sessions         Visit Diagnoses:    ICD-10-CM ICD-9-CM   1. Chronic bilateral low back pain with right-sided sciatica  M54.41 724.2    G89.29 724.3     338.29   2. Chronic right shoulder pain  M25.511 719.41    G89.29 338.29         Teo Griffith reports:   Subjective Questionnaire: QuickDASH: 39%  Clinical Progress: improved  Home Program Compliance: Yes  Treatment has included: therapeutic exercise, neuromuscular re-education, manual therapy, therapeutic activity, gait training, electrical stimulation, ultrasound, moist heat and cryotherapy      Subjective Evaluation    History of Present Illness    Subjective comment: Pt will follow up with his referring MD in two weeks.  Pain  Current pain rating: 3  At best pain ratin  At worst pain ratin  Location: back and right shoulder             Objective          Active Range of Motion   Cervical/Thoracic Spine     Thoracic   Extension: 50 degrees     Right Shoulder   Flexion: 140 degrees   Abduction: 120 degrees   External rotation 45°: 40 degrees   Internal rotation 45°: 35 degrees     Lumbar   Flexion: 50 degrees   Left rotation: 75 degrees   Right rotation: 75 degrees     Strength/Myotome Testing     Right Shoulder     Planes of Motion   Flexion: 4-   Abduction: 4-   External rotation at 0°: 4-   Internal rotation at 0°: 4-     Isolated Muscles   Biceps: 4+   Triceps: 4+     Left Hip   Planes of Motion   Flexion: 4+    Right Hip   Planes of Motion   Flexion: 4-    Left Knee   Flexion: 4+  Extension: 4+    Right Knee   Flexion: 4-  Extension: 4-          Assessment & Plan     Assessment  Impairments: abnormal muscle  firing, abnormal or restricted ROM, activity intolerance, impaired physical strength, lacks appropriate home exercise program and pain with function  Functional Limitations: carrying objects, lifting, sleeping, pulling, pushing, uncomfortable because of pain, stooping, reaching behind back and reaching overhead  Assessment details: Pt is a 36 y/o male referred to therapy for treatment of back and right shoulder pain.  Pt has attended 9 sessions with good effort.  Pt has noted mild gains with an improved Quick Dash score to 39% from 52%.  Pt also had mild gains with shoulder ROM and reports not having radicular pain below the knee for the past two weeks.  Pt responded well to tx with reports of pain at 3/10. Due to patient's continued reports of radicular symptoms, patient may benefit from a consult from a neurosurgeon.  Pt was also instructed that a consult to an orthopedic MD may be needed for further examination and treatment for the shoulder.  Prognosis: good    Goals  Plan Goals: STG 6 weeks    1 Pt will be instructed in a HEP.met  2 Pt will report a 50% decrease in radicular symptoms.partially met  3 Pt will demonstrate 160 degree of arom shoulder flexion.not met    LTG 12 weeks    1 Pt will improve his Mod Oswestry to less than 20%.NT  2 Pt will report back pain no greater than 3/10 with moderate lifting.not met  3 Pt will demonstrate 4/5 right shoulder gross strength.not met  4 Pt will improve his Quick Dash score to less than 15%.not met    Plan  Therapy options: will be seen for skilled therapy services  Planned modality interventions: cryotherapy, TENS, thermotherapy (hydrocollator packs), traction and ultrasound  Planned therapy interventions: abdominal trunk stabilization, ADL retraining, body mechanics training, flexibility, functional ROM exercises, home exercise program, IADL retraining, joint mobilization, manual therapy, neuromuscular re-education, postural training, soft tissue mobilization,  strengthening, stretching and therapeutic activities  Frequency: 2x week  Duration in weeks: 4  Treatment plan discussed with: patient  Plan details: Will follow for optimal gains.  Moderate Evaluation  21076  Re-evaluation   78902  Therapeutic exercise  08150  Therapeutic activity    85096  Neuromuscular re-education   46422  Manual therapy   70170  Gait training  18441  Unattended e-stim (Medicaid/Medicare)     Moist heat/cryotherapy 86312   Ultrasound   30737  Mechanical traction 52349             Recommendations: Continue as planned  Timeframe: 1 month  Prognosis to achieve goals: good      Timed:         Manual Therapy:    12     mins  81902;     Therapeutic Exercise:    31     mins  31919;     Neuromuscular Ruth:        mins  20575;    Therapeutic Activity:          mins  36724;     Gait Training:           mins  68175;     Ultrasound:          mins  89598;    Ionto                                   mins   41530  Self Care                            mins   78897  Canalith Repos         mins 77677      Un-Timed:  Electrical Stimulation:    10     mins  27908 ( );  Dry Needling          mins self-pay  Traction          mins 99690  Re-Eval                               mins  82481      Timed Treatment:   43   mins   Total Treatment:     53   mins          PT: Tim Jackson PT     [place] License:  [id number]    Electronically signed by Tim Jackson PT, 03/13/23, 10:08 AM EDT    Certification Period: 3/13/2023 thru 6/10/2023  I certify that the therapy services are furnished while this patient is under my care.  The services outlined above are required by this patient, and will be reviewed every 90 days.         Physician Signature:__________________________________________________    PHYSICIAN: Alexis Sanchez MD  NPI: [id number]                                      DATE:  :     Please sign and return via fax to [email or web address] . Thank you, [facility]

## 2023-03-15 ENCOUNTER — TELEPHONE (OUTPATIENT)
Dept: PHYSICAL THERAPY | Facility: CLINIC | Age: 38
End: 2023-03-15

## 2023-03-20 ENCOUNTER — TREATMENT (OUTPATIENT)
Dept: PHYSICAL THERAPY | Facility: CLINIC | Age: 38
End: 2023-03-20
Payer: OTHER GOVERNMENT

## 2023-03-20 DIAGNOSIS — G89.29 CHRONIC BILATERAL LOW BACK PAIN WITH RIGHT-SIDED SCIATICA: Primary | ICD-10-CM

## 2023-03-20 DIAGNOSIS — M25.511 CHRONIC RIGHT SHOULDER PAIN: ICD-10-CM

## 2023-03-20 DIAGNOSIS — M54.41 CHRONIC BILATERAL LOW BACK PAIN WITH RIGHT-SIDED SCIATICA: Primary | ICD-10-CM

## 2023-03-20 DIAGNOSIS — G89.29 CHRONIC RIGHT SHOULDER PAIN: ICD-10-CM

## 2023-03-20 PROCEDURE — 97140 MANUAL THERAPY 1/> REGIONS: CPT | Performed by: PHYSICAL THERAPIST

## 2023-03-20 PROCEDURE — G0283 ELEC STIM OTHER THAN WOUND: HCPCS | Performed by: PHYSICAL THERAPIST

## 2023-03-20 PROCEDURE — 97110 THERAPEUTIC EXERCISES: CPT | Performed by: PHYSICAL THERAPIST

## 2023-03-20 NOTE — PROGRESS NOTES
Physical Therapy Daily Treatment Note      Patient: Toe Griffith   : 1985  Referring practitioner: Alexis Sanchez MD  Date of Initial Visit: Type: THERAPY  Noted: 2023  Today's Date: 3/20/2023  Patient seen for 10 sessions       Visit Diagnoses:    ICD-10-CM ICD-9-CM   1. Chronic bilateral low back pain with right-sided sciatica  M54.41 724.2    G89.29 724.3     338.29   2. Chronic right shoulder pain  M25.511 719.41    G89.29 338.29       Subjective: Patient reports 2/10 low back and right shoulder pain upon arrival to therapy. Pt states he was unable to make his last appointment due to his child being sick.      Objective   See Exercise, Manual, and Modality Logs for complete treatment.       Assessment/Plan: Patient completed today's session with no changes in pain reported following, 2/10. Treatment consisted of therex as per flow sheet with continued focus on improved lumbar/ shoulder ROM, improved stability and reduced radicular symptoms. Manual therapy and modalities performed at conclusion. Exercise progressed with additional scapular strengthening activities added to program including sternal lifts. Pt observed with good tolerance and was provided with cues and demonstration for form, and for max benefit. Pt noted with tightness bilaterally in thoracic and lumbar paraspinals during manual therapy. Pt continues to benefit from therapy services and will be progressed as tolerated to address goals, reduce pain and restore function. No adverse reactions observed during, and/ or following tx. Continue with PT's POC.       Timed:         Manual Therapy:   15      mins  72882;     Therapeutic Exercise:    36     mins  78280;     Neuromuscular Ruth:        mins  11974;    Therapeutic Activity:          mins  53234;     Gait Training:           mins  59795;     Ultrasound:          mins  88543;    Ionto                                   mins   91003  Self Care                            mins    60332  [facility]         mins 53877      Un-Timed:  Electrical Stimulation:   10      mins  42401 ( );  Dry Needling          mins self-pay  Traction          mins 29828      Timed Treatment:  51    mins   Total Treatment:    61    mins    Natalie Smith. Linda, ELHAM  [place] License: [id number]

## 2023-03-22 ENCOUNTER — TREATMENT (OUTPATIENT)
Dept: PHYSICAL THERAPY | Facility: CLINIC | Age: 38
End: 2023-03-22
Payer: OTHER GOVERNMENT

## 2023-03-22 DIAGNOSIS — G89.29 CHRONIC RIGHT SHOULDER PAIN: ICD-10-CM

## 2023-03-22 DIAGNOSIS — G89.29 CHRONIC BILATERAL LOW BACK PAIN WITH RIGHT-SIDED SCIATICA: Primary | ICD-10-CM

## 2023-03-22 DIAGNOSIS — M54.41 CHRONIC BILATERAL LOW BACK PAIN WITH RIGHT-SIDED SCIATICA: Primary | ICD-10-CM

## 2023-03-22 DIAGNOSIS — M25.511 CHRONIC RIGHT SHOULDER PAIN: ICD-10-CM

## 2023-03-22 PROCEDURE — 97110 THERAPEUTIC EXERCISES: CPT | Performed by: PHYSICAL THERAPIST

## 2023-03-22 PROCEDURE — 97140 MANUAL THERAPY 1/> REGIONS: CPT | Performed by: PHYSICAL THERAPIST

## 2023-03-22 PROCEDURE — G0283 ELEC STIM OTHER THAN WOUND: HCPCS | Performed by: PHYSICAL THERAPIST

## 2023-03-22 NOTE — PROGRESS NOTES
Physical Therapy Daily Treatment Note      Patient: Teo Griffith   : 1985  Referring practitioner: Alexis Sanchez MD  Date of Initial Visit: Type: THERAPY  Noted: 2023  Today's Date: 3/22/2023  Patient seen for 11 sessions       Visit Diagnoses:    ICD-10-CM ICD-9-CM   1. Chronic bilateral low back pain with right-sided sciatica  M54.41 724.2    G89.29 724.3     338.29   2. Chronic right shoulder pain  M25.511 719.41    G89.29 338.29       Subjective: Patient reports 3/10 back and right shoulder pain upon arrival to therapy. Pt states he has difficulty getting moving in the mornings due to stiffness in his back. Pt is scheduled to have an epidural in the low back on .     Objective   See Exercise, Manual, and Modality Logs for complete treatment.       Assessment/Plan: Patient completed today's session with reports of slight decrease in pain following, 2/10. Pt received MH/Estim to low back and right shoulder to assist with pain control f/b therex as listed and manual STM at conclusion. Exercise progressed with additional standing strengthening activities added including step ups. Pt observed with good tolerance and was provided with cues and demonstration for form. Pt noted with tightness bilaterally during manual therapy. Pt denied cryotherapy at conclusion. Pt continues to benefit from therapy services and will be progressed as tolerated to address goals, reduce pain and restore function. Continue with PT's POC.       Timed:         Manual Therapy:   13      mins  80287;     Therapeutic Exercise:    36     mins  92046;     Neuromuscular Ruth:        mins  20297;    Therapeutic Activity:          mins  32651;     Gait Training:           mins  01439;     Ultrasound:          mins  74867;    Ionto                                   mins   03945  Self Care                            mins   98809  Canalith Repos         mins 69023      Un-Timed:  Electrical Stimulation:    10     mins   62251 ( );  Dry Needling          mins self-pay  Traction          mins 66255      Timed Treatment:  49    mins   Total Treatment:    59    mins    Natalie Smith. ELHAM Mckeon  [place] License: [id number]

## 2023-03-24 ENCOUNTER — TRANSCRIBE ORDERS (OUTPATIENT)
Dept: PHYSICAL THERAPY | Facility: CLINIC | Age: 38
End: 2023-03-24
Payer: OTHER GOVERNMENT

## 2023-03-24 DIAGNOSIS — M75.101 TEAR OF RIGHT ROTATOR CUFF, UNSPECIFIED TEAR EXTENT, UNSPECIFIED WHETHER TRAUMATIC: Primary | ICD-10-CM

## 2023-03-24 DIAGNOSIS — S43.431D SUPERIOR GLENOID LABRUM LESION OF RIGHT SHOULDER, SUBSEQUENT ENCOUNTER: ICD-10-CM

## 2023-04-03 ENCOUNTER — TREATMENT (OUTPATIENT)
Dept: PHYSICAL THERAPY | Facility: CLINIC | Age: 38
End: 2023-04-03
Payer: OTHER GOVERNMENT

## 2023-04-03 DIAGNOSIS — G89.29 CHRONIC BILATERAL LOW BACK PAIN WITH RIGHT-SIDED SCIATICA: Primary | ICD-10-CM

## 2023-04-03 DIAGNOSIS — M25.511 CHRONIC RIGHT SHOULDER PAIN: ICD-10-CM

## 2023-04-03 DIAGNOSIS — M54.41 CHRONIC BILATERAL LOW BACK PAIN WITH RIGHT-SIDED SCIATICA: Primary | ICD-10-CM

## 2023-04-03 DIAGNOSIS — G89.29 CHRONIC RIGHT SHOULDER PAIN: ICD-10-CM

## 2023-04-03 PROCEDURE — 97140 MANUAL THERAPY 1/> REGIONS: CPT | Performed by: PHYSICAL THERAPIST

## 2023-04-03 PROCEDURE — G0283 ELEC STIM OTHER THAN WOUND: HCPCS | Performed by: PHYSICAL THERAPIST

## 2023-04-03 PROCEDURE — 97110 THERAPEUTIC EXERCISES: CPT | Performed by: PHYSICAL THERAPIST

## 2023-04-03 NOTE — PROGRESS NOTES
Physical Therapy Daily Treatment Note      Patient: Teo Griffith   : 1985  Referring practitioner: Alexis Sanchez MD  Date of Initial Visit: Type: THERAPY  Noted: 2023  Today's Date: 4/3/2023  Patient seen for 12 sessions       Visit Diagnoses:    ICD-10-CM ICD-9-CM   1. Chronic bilateral low back pain with right-sided sciatica  M54.41 724.2    G89.29 724.3     338.29   2. Chronic right shoulder pain  M25.511 719.41    G89.29 338.29       Subjective Evaluation    History of Present Illness    Subjective comment: Patient notes that he is scheduled for the epidural in his low back on 2023 and right shoulder surgery in 2023.Pain  Pain scale: back-4/10, R) shoulder-3/10.           Objective   See Exercise, Manual, and Modality Logs for complete treatment.       Assessment & Plan     Assessment    Assessment details: Therapy session initiated with manual therapy to the lumbar region, with muscle guarding noted bilaterally.  There ex continues to focus on core strengthening, postural awareness, and scapular stabilization.  Patient displayed good body mechanics and posture with exercises, requiring minimal cueing for improved form.  Session concluded with MH/ESTIM, with no adverse reactions following.  He will continue to be progressed per his tolerance and POC.          Timed:         Manual Therapy:    15     mins  88928;     Therapeutic Exercise:    33     mins  99204;     Neuromuscular Ruth:        mins  23791;    Therapeutic Activity:          mins  57049;     Gait Training:           mins  06639;     Ultrasound:          mins  61732;    Ionto                                   mins   36468  Self Care                            mins   91369  Canalith Repos         mins 13958      Un-Timed:  Electrical Stimulation:    10     mins  30541 ( );  Dry Needling          mins self-pay  Traction          mins 20228      Timed Treatment:   48   mins   Total Treatment:     58   mins    Jacqui  CRISTOBAL Desai PT  [place] License: [id number]  Electronically signed by Jacqui Desai PT, 04/03/23, 12:40 PM EDT.

## 2023-04-10 ENCOUNTER — TREATMENT (OUTPATIENT)
Dept: PHYSICAL THERAPY | Facility: CLINIC | Age: 38
End: 2023-04-10
Payer: OTHER GOVERNMENT

## 2023-04-10 DIAGNOSIS — M54.41 CHRONIC BILATERAL LOW BACK PAIN WITH RIGHT-SIDED SCIATICA: Primary | ICD-10-CM

## 2023-04-10 DIAGNOSIS — M25.511 CHRONIC RIGHT SHOULDER PAIN: ICD-10-CM

## 2023-04-10 DIAGNOSIS — G89.29 CHRONIC BILATERAL LOW BACK PAIN WITH RIGHT-SIDED SCIATICA: Primary | ICD-10-CM

## 2023-04-10 DIAGNOSIS — G89.29 CHRONIC RIGHT SHOULDER PAIN: ICD-10-CM

## 2023-04-10 PROCEDURE — 97110 THERAPEUTIC EXERCISES: CPT | Performed by: PHYSICAL THERAPIST

## 2023-04-10 PROCEDURE — 97140 MANUAL THERAPY 1/> REGIONS: CPT | Performed by: PHYSICAL THERAPIST

## 2023-04-10 PROCEDURE — G0283 ELEC STIM OTHER THAN WOUND: HCPCS | Performed by: PHYSICAL THERAPIST

## 2023-04-10 NOTE — PROGRESS NOTES
Physical Therapy Re Certification Of Plan of Care  Patient: Teo Griffith   : 1985  Diagnosis/ICD-10 Code:  Chronic bilateral low back pain with right-sided sciatica [M54.41, G89.29]  Referring practitioner: Gregorio Thomas DO  Date of Initial Visit: Type: THERAPY  Noted: 2023  Today's Date: 4/10/2023  Patient seen for 13 sessions         Visit Diagnoses:    ICD-10-CM ICD-9-CM   1. Chronic bilateral low back pain with right-sided sciatica  M54.41 724.2    G89.29 724.3     338.29   2. Chronic right shoulder pain  M25.511 719.41    G89.29 338.29         Teo Griffith reports:   Subjective Questionnaire: Oswestry: 40%; Quick Dash 38%  Clinical Progress: improved  Home Program Compliance: Yes  Treatment has included: therapeutic exercise, neuromuscular re-education, manual therapy, therapeutic activity, gait training, electrical stimulation, ultrasound, moist heat and cryotherapy      Subjective Evaluation    History of Present Illness    Subjective comment: Pt will receive a shoulder scope on May 5, 2023.  He will receive epidural shots on 2023. Pain  Current pain rating: 3 (4 back)  At best pain rating: 3  At worst pain ratin  Location: right shld and back             Objective          Active Range of Motion     Lumbar   Flexion: 50 degrees   Left lateral flexion: 75 degrees   Right lateral flexion: 75 degrees     Strength/Myotome Testing     Right Shoulder     Planes of Motion   Flexion: 4-   Abduction: 4-   External rotation at 0°: 4-   Internal rotation at 0°: 4-     Left Hip   Planes of Motion   Flexion: 4    Right Hip   Planes of Motion   Flexion: 4-    Left Knee   Flexion: 4  Extension: 4    Right Knee   Flexion: 4-  Extension: 4-          Assessment & Plan     Assessment  Impairments: abnormal muscle firing, abnormal or restricted ROM, activity intolerance, impaired physical strength, lacks appropriate home exercise program and pain with function  Functional Limitations:  carrying objects, lifting, sleeping, pulling, pushing, uncomfortable because of pain, stooping, reaching behind back and reaching overhead  Assessment details: Pt is a 38 y/o male referred to therapy for treatment of back and right shoulder pain.  Pt has attended 13 sessions with good effort.  Pt has noted mild gains with an improved Quick Dash score to 38% from 39%.  Pt had an improved Mod Oswestry score to 40%.  Pt is now scheduled for a arthroscopy of the shoulder and injection for the lumbar spine.  Pt responded well to tx today with 3/10 post pain.  Prognosis: good    Goals  Plan Goals: STG 6 weeks    1 Pt will be instructed in a HEP.met  2 Pt will report a 50% decrease in radicular symptoms.partially met  3 Pt will demonstrate 160 degree of arom shoulder flexion.not met    LTG 12 weeks    1 Pt will improve his Mod Oswestry to less than 20%.not met  2 Pt will report back pain no greater than 3/10 with moderate lifting.not met  3 Pt will demonstrate 4/5 right shoulder gross strength.not met  4 Pt will improve his Quick Dash score to less than 15%.not met    Plan  Therapy options: will be seen for skilled therapy services  Planned modality interventions: cryotherapy, TENS, thermotherapy (hydrocollator packs), traction and ultrasound  Planned therapy interventions: abdominal trunk stabilization, ADL retraining, body mechanics training, flexibility, functional ROM exercises, home exercise program, IADL retraining, joint mobilization, manual therapy, neuromuscular re-education, postural training, soft tissue mobilization, strengthening, stretching and therapeutic activities  Frequency: 1x week  Duration in weeks: 8  Treatment plan discussed with: patient  Plan details: Will follow for optimal gains.  Moderate Evaluation  56085  Re-evaluation   28774  Therapeutic exercise  99644  Therapeutic activity    45068  Neuromuscular re-education   83183  Manual therapy   44742  Gait training  57926  Unattended e-stim  (Medicaid/Medicare)     Moist heat/cryotherapy 38326   Ultrasound   35584  Mechanical traction 38143             Recommendations: Continue as planned  Timeframe: 2 months  Prognosis to achieve goals: good      Timed:         Manual Therapy:    15     mins  26408;     Therapeutic Exercise:    27     mins  23639;     Neuromuscular Ruth:        mins  69446;    Therapeutic Activity:          mins  60338;     Gait Training:           mins  01156;     Ultrasound:          mins  97845;    Ionto                                   mins   85001  Self Care                            mins   04793  Canalith Repos         mins 48321      Un-Timed:  Electrical Stimulation:    10     mins  31527 ( );  Dry Needling          mins self-pay  Traction          mins 09036  Re-Eval                               mins  37835      Timed Treatment:   42   mins   Total Treatment:     52   mins          PT: Tim Jackson PT     [place] License:  [id number]    Electronically signed by Tim Jackson PT, 04/10/23, 10:08 AM EDT    Certification Period: 4/10/2023 thru 7/8/2023  I certify that the therapy services are furnished while this patient is under my care.  The services outlined above are required by this patient, and will be reviewed every 90 days.         Physician Signature:__________________________________________________    PHYSICIAN: Gregorio Thomas DO  NPI: [id number]                                      DATE:  :     Please sign and return via fax to [email or web address] . Thank you, [facility]

## 2023-04-17 ENCOUNTER — TREATMENT (OUTPATIENT)
Dept: PHYSICAL THERAPY | Facility: CLINIC | Age: 38
End: 2023-04-17
Payer: OTHER GOVERNMENT

## 2023-04-17 DIAGNOSIS — G89.29 CHRONIC BILATERAL LOW BACK PAIN WITH RIGHT-SIDED SCIATICA: Primary | ICD-10-CM

## 2023-04-17 DIAGNOSIS — G89.29 CHRONIC RIGHT SHOULDER PAIN: ICD-10-CM

## 2023-04-17 DIAGNOSIS — M54.41 CHRONIC BILATERAL LOW BACK PAIN WITH RIGHT-SIDED SCIATICA: Primary | ICD-10-CM

## 2023-04-17 DIAGNOSIS — M25.511 CHRONIC RIGHT SHOULDER PAIN: ICD-10-CM

## 2023-04-17 PROCEDURE — 97140 MANUAL THERAPY 1/> REGIONS: CPT | Performed by: PHYSICAL THERAPIST

## 2023-04-17 PROCEDURE — 97110 THERAPEUTIC EXERCISES: CPT | Performed by: PHYSICAL THERAPIST

## 2023-04-17 NOTE — PROGRESS NOTES
Physical Therapy Daily Treatment Note      Patient: Teo Griffith   : 1985  Referring practitioner: Alexis Sanchez MD  Date of Initial Visit: Type: THERAPY  Noted: 2023  Today's Date: 2023  Patient seen for 14 sessions       Visit Diagnoses:    ICD-10-CM ICD-9-CM   1. Chronic bilateral low back pain with right-sided sciatica  M54.41 724.2    G89.29 724.3     338.29   2. Chronic right shoulder pain  M25.511 719.41    G89.29 338.29       Subjective Evaluation    History of Present Illness    Subjective comment: Pt reports having 2/10 lower back pain today.  Pt will receive a lumbar injection this Wednesday.       Objective   See Exercise, Manual, and Modality Logs for complete treatment.       Assessment & Plan     Assessment    Assessment details: Pt requested to hold estim and abbreviate session due to his child waiting on him in the lobby.  Tx consisted of exercises for centralization of symptoms and improved shld mobility with min cues required due to improved performance of there x with good technique.  Tx ended with stm to lower back for decreased pain.    Plan  Plan details: Will follow progressing core stability and decreased pain.          Timed:         Manual Therapy:    15     mins  25380;     Therapeutic Exercise:    31     mins  21835;     Neuromuscular Ruth:        mins  60208;    Therapeutic Activity:          mins  84416;     Gait Training:           mins  92773;     Ultrasound:          mins  01318;    Ionto                                   mins   84358  Self Care                            mins   40234  Canalith Repos         mins 92897      Un-Timed:  Electrical Stimulation:         mins  86456 (MC );  Dry Needling          mins self-pay  Traction          mins 11745      Timed Treatment:   46   mins   Total Treatment:     46   mins    Tim Jackson PT  [place] License: [id number]      Electronically signed by Tim Jackson PT, 23, 11:05 AM EDT   [facility] Emergency Department Lab result notification [Adult-Female]    [place] ED lab result protocol used  Urine Culture    Reason for call  Notify of lab results, assess symptoms,  review ED providers recommendations/discharge instructions (if necessary) and advise per ED lab result f/u protocol    Lab Result (including Rx patient on, if applicable)  Final Urine Culture Report on 12/8/17.  [place] ED discharge antibiotic's: Nitrofurantoin Macrocrystal-Monohydrate (Macrobid) 100 mg PO capsule, 1 capsule (100 mg) by mouth 2 times daily for 7 days  Bacteria #1: 10,000 to 50,000 colonies/mL Proteus mirabilis is [RESISTANT] to ED discharge antibiotic   Bacteria #2: 10,000 to 50,000 colonies/mL Strain 2 Proteus mirabilis is [RESISTANT] to ED discharge antibiotic   Recommendations in treatment per  ED Lab result protocol.    Information table from ED Provider visit on 12/5/17  ED diagnosis   Acute lower urinary tract infection   ED provider   Vilma Chung CNP      Symptoms reported at ED visit (Chief complaint, HPI) Isaura Starks is a 24 year old female who presents with family to the Emergency Department to rule out UTI. The patient reports that for the past week, she has had lower pelvic, pressure and pain, along with increased urgency and frequency, with limited output. She denies any fevers, vomiting, back pain, changes in BM, vaginal discharge or rashes. Her last menstrual period was last week. Of note, she had similar symptoms about 1 month ago which resolved on their own.    ED providers Impression and Plan (applicable information) This patient has symptoms consistent with a urinary tract infection.  Urinalysis is suggestive of the infection.  There has been no fever, back/flank pain or abdominal pain.  There is no clinical evidence of pyelonephritis, appendicitis,  or diverticulitis.  The patient will be started on antibiotics for the infection. The patient understands the importance  "of returning with increasing pain, vomiting, fever, or inability to tolerate the oral antibiotic.  Follow up with primary physician is indicated if not improving in 2-3 days.    Significant Medical hx, if applicable None   Coumadin/Warfarin [Yes /No] No   Creatinine Level (mg/dl) Not available   Creatinine clearance (ml/min), if applicable Not available   Pregnant (Yes/No/NA) No   Breastfeeding (Yes/No/NA) No   Allergies NKA   Weight, if applicable 54.4 Kg      RN Assessment (Patient s current Symptoms), include time called.  [Insert Left message here if message left]  Feeling improved, is \"a lot better\".   Did answer questions, and reviewed final results.    RN Recommendations/Instructions per [facility] ED lab result protocol  Patient notified of lab result and treatment recommendations.  Rx for Bactrim sent to [Pharmacy - [facility] in [place]].  RN reviewed information about stopping Macrobid once Bactrim obtained.      Please Contact your PCP clinic or return to the Emergency department if your:    Symptoms return.    Symptoms do not resolve after completing antibiotic.    Symptoms worsen or other concerning symptom's.    PCP follow-up Questions asked: NO    Katharine Suarez RN    [facility] RN  Lung Nodule and ED Lab Results F/U RN  Epic pool (ED late result f/u RN) : P [id number]   # [phone]    Copy of Lab result   Order   Urine Culture Aerobic Bacterial [[id number]] (Order [id number])   Exam Information   Exam Date Exam Time Accession # Results    12/5/17  9:04 PM [id number]    Component Results   Component Collected Lab   Specimen Description 12/05/2017  9:04    Midstream Urine   Special Requests 12/05/2017  9:04 PM 75   Specimen received in preservative   Culture Micro (Abnormal) 12/05/2017  9:04    10,000 to 50,000 colonies/mL   Proteus mirabilis      Culture Micro (Abnormal) 12/05/2017  9:04    10,000 to 50,000 colonies/mL   Strain 2   Proteus mirabilis      Culture & Susceptibility "   PROTEUS MIRABILIS   Antibiotic Interpretation Sensitivity Unit Method Status   AMPICILLIN Sensitive <=2 ug/mL LAURA Final   AMPICILLIN/SULBACTAM Sensitive <=2 ug/mL LAURA Final   CEFAZOLIN Sensitive <=4 ug/mL LAURA Final   Comment: Cefazolin LAURA breakpoints are for the treatment of uncomplicated urinary tract   infections.  For the treatment of systemic infections, please contact the   laboratory for additional testing.   CEFEPIME Sensitive <=1 ug/mL LAURA Final   CEFOXITIN Sensitive <=4 ug/mL LAURA Final   CEFTAZIDIME Sensitive <=1 ug/mL LAURA Final   CEFTRIAXONE Sensitive <=1 ug/mL LAURA Final   CIPROFLOXACIN Sensitive <=0.25 ug/mL LAURA Final   GENTAMICIN Sensitive <=1 ug/mL LAURA Final   LEVOFLOXACIN Sensitive <=0.12 ug/mL LAURA Final   NITROFURANTOIN Resistant 128 ug/mL LAURA Final   Piperacillin/Tazo Sensitive <=4 ug/mL LAURA Final   TOBRAMYCIN Sensitive <=1 ug/mL LAURA Final   Trimethoprim/Sulfa Sensitive <=1/19 ug/mL LAURA Final         PROTEUS MIRABILIS   Antibiotic Interpretation Sensitivity Unit Method Status   AMPICILLIN Sensitive <=2 ug/mL LAURA Final   AMPICILLIN/SULBACTAM Sensitive <=2 ug/mL LAURA Final   CEFAZOLIN Sensitive <=4 ug/mL LAURA Final   Comment: Cefazolin LAURA breakpoints are for the treatment of uncomplicated urinary tract   infections.  For the treatment of systemic infections, please contact the   laboratory for additional testing.   CEFEPIME Sensitive <=1 ug/mL LAURA Final   CEFOXITIN Sensitive <=4 ug/mL LAURA Final   CEFTAZIDIME Sensitive <=1 ug/mL LAURA Final   CEFTRIAXONE Sensitive <=1 ug/mL LAURA Final   CIPROFLOXACIN Sensitive <=0.25 ug/mL LAURA Final   GENTAMICIN Sensitive <=1 ug/mL LAURA Final   LEVOFLOXACIN Sensitive <=0.12 ug/mL LAURA Final   NITROFURANTOIN Resistant 128 ug/mL LAURA Final   Piperacillin/Tazo Sensitive <=4 ug/mL LAURA Final   TOBRAMYCIN Sensitive <=1 ug/mL LAURA Final   Trimethoprim/Sulfa Sensitive <=1/19 ug/mL LAURA Final

## 2023-04-24 ENCOUNTER — TREATMENT (OUTPATIENT)
Dept: PHYSICAL THERAPY | Facility: CLINIC | Age: 38
End: 2023-04-24
Payer: OTHER GOVERNMENT

## 2023-04-24 DIAGNOSIS — G89.29 CHRONIC RIGHT SHOULDER PAIN: ICD-10-CM

## 2023-04-24 DIAGNOSIS — M54.41 CHRONIC BILATERAL LOW BACK PAIN WITH RIGHT-SIDED SCIATICA: Primary | ICD-10-CM

## 2023-04-24 DIAGNOSIS — G89.29 CHRONIC BILATERAL LOW BACK PAIN WITH RIGHT-SIDED SCIATICA: Primary | ICD-10-CM

## 2023-04-24 DIAGNOSIS — M25.511 CHRONIC RIGHT SHOULDER PAIN: ICD-10-CM

## 2023-04-24 PROCEDURE — 97110 THERAPEUTIC EXERCISES: CPT | Performed by: PHYSICAL THERAPIST

## 2023-04-24 PROCEDURE — G0283 ELEC STIM OTHER THAN WOUND: HCPCS | Performed by: PHYSICAL THERAPIST

## 2023-04-24 PROCEDURE — 97140 MANUAL THERAPY 1/> REGIONS: CPT | Performed by: PHYSICAL THERAPIST

## 2023-04-24 NOTE — PROGRESS NOTES
Physical Therapy Daily Treatment Note      Patient: Teo Griffith   : 1985  Referring practitioner: Alexis Sanchez MD  Date of Initial Visit: Type: THERAPY  Noted: 2023  Today's Date: 2023  Patient seen for 15 sessions       Visit Diagnoses:    ICD-10-CM ICD-9-CM   1. Chronic bilateral low back pain with right-sided sciatica  M54.41 724.2    G89.29 724.3     338.29   2. Chronic right shoulder pain  M25.511 719.41    G89.29 338.29       Subjective Evaluation    History of Present Illness    Subjective comment: Pt received an epidural to his back that improve his hip pain.  Pt has 3/10 back and 2/10 shoudler pain.       Objective   See Exercise, Manual, and Modality Logs for complete treatment.       Assessment & Plan     Assessment    Assessment details: Tx today consisted of exercises for centralization of symptoms, postural stability, and shoulder mobility; followed by stm to lower back in prone and ended with ice to lower back and ice and estim to shoulder with ice.  Pt reported similar post pain scores.    Plan  Plan details: Will follow progressing core stability and decreased pain in order to improved occupational tasks.          Timed:         Manual Therapy:    15     mins  25163;     Therapeutic Exercise:    26     mins  05599;     Neuromuscular Ruth:        mins  10958;    Therapeutic Activity:          mins  60512;     Gait Training:           mins  36220;     Ultrasound:          mins  47088;    Ionto                                   mins   80423  Self Care                            mins   77678  Canalith Repos         mins 51487      Un-Timed:  Electrical Stimulation:    10     mins  73330 ( );  Dry Needling          mins self-pay  Traction          mins 77715      Timed Treatment:   41   mins   Total Treatment:     51   mins    Tim Jackson PT  [place] License: [id number]      Electronically signed by Tim Jackson PT, 23, 10:36 AM EDT

## 2023-05-01 ENCOUNTER — TREATMENT (OUTPATIENT)
Dept: PHYSICAL THERAPY | Facility: CLINIC | Age: 38
End: 2023-05-01
Payer: OTHER GOVERNMENT

## 2023-05-01 DIAGNOSIS — G89.29 CHRONIC RIGHT SHOULDER PAIN: ICD-10-CM

## 2023-05-01 DIAGNOSIS — M25.511 CHRONIC RIGHT SHOULDER PAIN: ICD-10-CM

## 2023-05-01 DIAGNOSIS — M54.41 CHRONIC BILATERAL LOW BACK PAIN WITH RIGHT-SIDED SCIATICA: Primary | ICD-10-CM

## 2023-05-01 DIAGNOSIS — G89.29 CHRONIC BILATERAL LOW BACK PAIN WITH RIGHT-SIDED SCIATICA: Primary | ICD-10-CM

## 2023-05-01 PROCEDURE — 97140 MANUAL THERAPY 1/> REGIONS: CPT | Performed by: PHYSICAL THERAPIST

## 2023-05-01 PROCEDURE — 97110 THERAPEUTIC EXERCISES: CPT | Performed by: PHYSICAL THERAPIST

## 2023-05-01 PROCEDURE — G0283 ELEC STIM OTHER THAN WOUND: HCPCS | Performed by: PHYSICAL THERAPIST

## 2023-05-01 NOTE — PROGRESS NOTES
Physical Therapy Daily Treatment Note      Patient: Teo Griffith   : 1985  Referring practitioner: Alexis Sanchez MD  Date of Initial Visit: Type: THERAPY  Noted: 2023  Today's Date: 2023  Patient seen for 16 sessions       Visit Diagnoses:    ICD-10-CM ICD-9-CM   1. Chronic bilateral low back pain with right-sided sciatica  M54.41 724.2    G89.29 724.3     338.29   2. Chronic right shoulder pain  M25.511 719.41    G89.29 338.29       Subjective Evaluation    History of Present Illness    Subjective comment: Pt reports having 3/10 pain today.  Pt will receive an arthroscope of right shld this Friday.         Objective   See Exercise, Manual, and Modality Logs for complete treatment.       Assessment & Plan     Assessment    Assessment details: Tx today consisted of exercises progressed with added resistance and reps as tolerated; followed by stm to lower back and ended with ice and estim to shoulder for decreased pain.  Pt responded well to added resistance with btb and shld flex and scaption with 2#.    Plan  Plan details: Will wait for patient to call next week to determine if patient is discharged or therapy will cont at this time.          Timed:         Manual Therapy:    14     mins  55747;     Therapeutic Exercise:    27     mins  85927;     Neuromuscular Ruth:        mins  17192;    Therapeutic Activity:          mins  64397;     Gait Training:           mins  87409;     Ultrasound:          mins  44818;    Ionto                                   mins   66989  Self Care                            mins   07459  Canalith Repos         mins 74957      Un-Timed:  Electrical Stimulation:    10     mins  44904 ( );  Dry Needling          mins self-pay  Traction          mins 99714      Timed Treatment:   41   mins   Total Treatment:     51   mins    Tim Jackson PT  [place] License: [id number]      Electronically signed by Tim Jackson PT, 23, 10:00 AM EDT

## 2023-05-09 ENCOUNTER — TREATMENT (OUTPATIENT)
Dept: PHYSICAL THERAPY | Facility: CLINIC | Age: 38
End: 2023-05-09
Payer: OTHER GOVERNMENT

## 2023-05-09 ENCOUNTER — TRANSCRIBE ORDERS (OUTPATIENT)
Dept: PHYSICAL THERAPY | Facility: CLINIC | Age: 38
End: 2023-05-09
Payer: OTHER GOVERNMENT

## 2023-05-09 DIAGNOSIS — M25.511 CHRONIC RIGHT SHOULDER PAIN: ICD-10-CM

## 2023-05-09 DIAGNOSIS — M54.41 CHRONIC BILATERAL LOW BACK PAIN WITH RIGHT-SIDED SCIATICA: ICD-10-CM

## 2023-05-09 DIAGNOSIS — G89.29 CHRONIC RIGHT SHOULDER PAIN: ICD-10-CM

## 2023-05-09 DIAGNOSIS — Z98.890 S/P ARTHROSCOPY OF RIGHT SHOULDER: Primary | ICD-10-CM

## 2023-05-09 DIAGNOSIS — G89.29 CHRONIC BILATERAL LOW BACK PAIN WITH RIGHT-SIDED SCIATICA: ICD-10-CM

## 2023-05-09 DIAGNOSIS — M25.611 DECREASED ROM OF RIGHT SHOULDER: ICD-10-CM

## 2023-05-09 DIAGNOSIS — M25.511 RIGHT SHOULDER PAIN, UNSPECIFIED CHRONICITY: Primary | ICD-10-CM

## 2023-05-09 NOTE — PROGRESS NOTES
Physical Therapy Re-Evaluation    Patient: Teo Griffith   : 1985  Diagnosis/ICD-10 Code:  S/P arthroscopy of right shoulder [Z98.890]  Referring practitioner: Gregorio Thomas DO  Date of Initial Visit: 2023  Today's Date: 5/10/2023  Patient seen for 17 session         Visit Diagnoses:    ICD-10-CM ICD-9-CM   1. S/P arthroscopy of right shoulder  Z98.890 V45.89   2. Chronic bilateral low back pain with right-sided sciatica  M54.41 724.2    G89.29 724.3     338.29   3. Chronic right shoulder pain  M25.511 719.41    G89.29 338.29   4. Decreased ROM of right shoulder  M25.611 719.51         Subjective Questionnaire: QuickDASH: 80%      Subjective Evaluation    History of Present Illness  Date of surgery: 2023  Mechanism of injury: Pt has suffered from right shoulder pain for greater than a year.  The patient receved an MRI of the shoulder last Fall that demonstrated a RC tear.  The patient has had continued pain and was referred by MD Thomas for evaluation.  The patient received surgical intervention on 2023 and has now been referred to therapy for rehabilitation.       Patient Occupation: ; [employer]   Precautions and Work Restrictions: per protocolQuality of life: good    Pain  Current pain ratin  At best pain rating: 3  At worst pain ratin  Location: right shoulder  Quality: dull ache  Relieving factors: ice, medications and rest  Aggravating factors: keyboarding, lifting, movement, overhead activity, sleeping and repetitive movement  Progression: improved    Hand dominance: right    Patient Goals  Patient goals for therapy: decreased edema, decreased pain, increased motion, increased strength, independence with ADLs/IADLs, return to sport/leisure activities and return to work             Objective          Postural Observations    Additional Postural Observation Details  Right UE guarding noted    Observations     Additional Shoulder Observation Details  Wound  incision demonstrate good healing with no drainage    Palpation     Additional Palpation Details  Tenderness along incision    Cervical/Thoracic Screen   Cervical range of motion within normal limits    Neurological Testing     Sensation     Shoulder   Left Shoulder   Intact: light touch    Right Shoulder   Intact: light touch    Passive Range of Motion     Right Shoulder   Flexion: 70 degrees   Abduction: 65 degrees     Strength/Myotome Testing     Left Wrist/Hand      (2nd hand position)     Trial 1: 120 lbs    Trial 2: 120 lbs    Trial 3: 120 lbs    Average: 120 lbs    Right Wrist/Hand      (2nd hand position)     Trial 1: 90 lbs    Trial 2: 85 lbs    Trial 3: 90 lbs    Average: 88.33 lbs          Assessment & Plan     Assessment  Impairments: abnormal muscle firing, abnormal or restricted ROM, activity intolerance, impaired physical strength, lacks appropriate home exercise program, pain with function and safety issue  Functional Limitations: carrying objects, lifting, sleeping, pulling, pushing, uncomfortable because of pain, stooping, reaching behind back and reaching overhead  Assessment details: Pt is a 36 y/o male evaluated for treatment following a right shoulder arthroscopy.  Pt presents with decreased shoulder ROM, decreased strength and increased pain.  Therapy will follow for improved shoulder stability for safe return to occupational tasks.  Prognosis: good    Goals  Plan Goals: STG 6 weeks    1 Pt will be instructed in a HEP.met  2 Pt will report a 50% decrease in radicular symptoms.partially met  3 Pt will demonstrate 120 degree of arom shoulder flexion.not met  Pt will improve his Quick Dash to less than 40%. New      LTG 12 weeks    1 Pt will improve his Mod Oswestry to less than 20%.not met  2 Pt will report back pain no greater than 3/10 with moderate lifting.not met  3 Pt will demonstrate 4/5 right shoulder gross strength.not met  4 Pt will improve his Quick Dash score to less than  15%.not met  5 Pt will demonstrate 140 degrees of AROM flexion with no distress.New    Plan  Therapy options: will be seen for skilled therapy services  Planned modality interventions: cryotherapy, TENS, thermotherapy (hydrocollator packs), traction and ultrasound  Planned therapy interventions: abdominal trunk stabilization, ADL retraining, body mechanics training, flexibility, functional ROM exercises, home exercise program, IADL retraining, joint mobilization, manual therapy, neuromuscular re-education, postural training, soft tissue mobilization, strengthening, stretching and therapeutic activities  Frequency: 2x week  Duration in weeks: 12  Treatment plan discussed with: patient  Plan details: Will follow for optimal gains.  Moderate Evaluation  84409  Re-evaluation   32563  Therapeutic exercise  34941  Therapeutic activity    94697  Neuromuscular re-education   95823  Manual therapy   81338  Gait training  43674  Unattended e-stim (Medicaid/Medicare)     Moist heat/cryotherapy 57197   Ultrasound   44024  Mechanical traction 47289            Timed:         Manual Therapy:         mins  81591;     Therapeutic Exercise:    25     mins  47833;     Neuromuscular Ruth:        mins  66666;    Therapeutic Activity:          mins  77561;     Gait Training:           mins  37510;     Ultrasound:          mins  02540;    Ionto                                   mins   63715  Self Care                            mins   55051  Canalith Repos         mins 50350      Un-Timed:  Electrical Stimulation:         mins  47809 ( );  Dry Needling          mins self-pay  Traction          mins 77942  Low Eval          Mins  44151  Mod Eval          Mins  22582  Re Eval                      25      Mins  44427        Timed Treatment:   25   mins   Total Treatment:     50   mins          PT: Tim Jackson, PT     License Number: [id number]  Electronically signed by Tim Jackson PT, 05/09/23, 4:12 PM  EDT    Certification Period: 5/10/2023 thru 8/7/2023  I certify that the therapy services are furnished while this patient is under my care.  The services outlined above are required by this patient, and will be reviewed every 90 days.         Physician Signature:__________________________________________________    PHYSICIAN:Gregorio Thomas DO  NPI: [id number]                                     DATE:      Please sign and return via fax to [email or web address] . Thank you, [facility].

## 2023-05-15 ENCOUNTER — TREATMENT (OUTPATIENT)
Dept: PHYSICAL THERAPY | Facility: CLINIC | Age: 38
End: 2023-05-15
Payer: OTHER GOVERNMENT

## 2023-05-15 DIAGNOSIS — M25.511 CHRONIC RIGHT SHOULDER PAIN: Primary | ICD-10-CM

## 2023-05-15 DIAGNOSIS — M25.611 DECREASED ROM OF RIGHT SHOULDER: ICD-10-CM

## 2023-05-15 DIAGNOSIS — Z98.890 S/P ARTHROSCOPY OF RIGHT SHOULDER: ICD-10-CM

## 2023-05-15 DIAGNOSIS — G89.29 CHRONIC RIGHT SHOULDER PAIN: Primary | ICD-10-CM

## 2023-05-15 PROCEDURE — G0283 ELEC STIM OTHER THAN WOUND: HCPCS | Performed by: PHYSICAL THERAPIST

## 2023-05-15 PROCEDURE — 97110 THERAPEUTIC EXERCISES: CPT | Performed by: PHYSICAL THERAPIST

## 2023-05-15 NOTE — PROGRESS NOTES
Physical Therapy Daily Treatment Note      Patient: Teo Griffith   : 1985  Referring practitioner: Gregorio Thomas DO  Date of Initial Visit: Type: THERAPY  Noted: 2023  Today's Date: 5/15/2023  Patient seen for 1 sessions       Visit Diagnoses:    ICD-10-CM ICD-9-CM   1. Chronic bilateral low back pain with right-sided sciatica  M54.41 724.2    G89.29 724.3     338.29   2. Chronic right shoulder pain  M25.511 719.41    G89.29 338.29       Subjective: Patient reports 4/10 right shoulder pain upon arrival to therapy. Pt states the incision on the back of his shoulder seems to be raised. Pt is scheduled to f/u with referring ortho this afternoon.      Objective   See Exercise, Manual, and Modality Logs for complete treatment.       Assessment/Plan: Supervising therapist, Tim Jackson, PT notified and aware of pt's subjective reports. PT and PTA assessed posterior incisional area with no area of concern observed. PT and PTA educated patient to notify MD at f/u appointment. Pt verbalized understanding. Treatment performed including therex as listed to assist with improved shoulder ROM and scapular awareness f/b shoulder PROM and modalities at conclusion. Pt provided with cues and demonstration as needed during therex to maintain form and for improved scapular awareness. Pt continues to benefit from therapy services and will be progressed as tolerated to address goals, reduce pain and restore function. No signs of distress or adverse reactions observed during, and/ or following tx. Continue with PT's POC.       Timed:         Manual Therapy:         mins  03819;     Therapeutic Exercise:    30     mins  10624;     Neuromuscular Ruth:        mins  79258;    Therapeutic Activity:          mins  56301;     Gait Training:           mins  22581;     Ultrasound:          mins  33198;    Ionto                                   mins   03461  Self Care                            mins    33005      Un-Timed:  Electrical Stimulation:   10      mins  31873 ( );  Dry Needling          mins self-pay  Traction          mins 95988  Canalith Repos         mins 19755    Timed Treatment:  30    mins   Total Treatment:    40    mins    Natalie Smith. Linda, ELHAM  [place] License: [id number]

## 2023-05-17 ENCOUNTER — TREATMENT (OUTPATIENT)
Dept: PHYSICAL THERAPY | Facility: CLINIC | Age: 38
End: 2023-05-17
Payer: OTHER GOVERNMENT

## 2023-05-17 DIAGNOSIS — G89.29 CHRONIC RIGHT SHOULDER PAIN: Primary | ICD-10-CM

## 2023-05-17 DIAGNOSIS — M25.511 CHRONIC RIGHT SHOULDER PAIN: Primary | ICD-10-CM

## 2023-05-17 DIAGNOSIS — M25.611 DECREASED ROM OF RIGHT SHOULDER: ICD-10-CM

## 2023-05-17 PROCEDURE — 97110 THERAPEUTIC EXERCISES: CPT | Performed by: PHYSICAL THERAPIST

## 2023-05-17 PROCEDURE — G0283 ELEC STIM OTHER THAN WOUND: HCPCS | Performed by: PHYSICAL THERAPIST

## 2023-05-17 NOTE — PROGRESS NOTES
Physical Therapy Daily Treatment Note      Patient: Teo Griffith   : 1985  Referring practitioner: Gregorio Thomas DO  Date of Initial Visit: Type: THERAPY  Noted: 2023  Today's Date: 2023  Patient seen for 2 sessions       Visit Diagnoses:    ICD-10-CM ICD-9-CM   1. Chronic right shoulder pain  M25.511 719.41    G89.29 338.29   2. Decreased ROM of right shoulder  M25.611 719.51       Subjective Evaluation    History of Present Illness    Subjective comment: Pt reports he has not been sleeping and has 4/10 shoulder pain today.  Pt will call his MD for meds to help with sleeping.       Objective   See Exercise, Manual, and Modality Logs for complete treatment.       Assessment & Plan     Assessment    Assessment details: Tx today consisted of exercises for improved postural stability and shoulder mobility; followed by prom to shoulder and ended with ice and estim for decreased pain.  Pt demonstrated good effort today and reported decreased pain to 3/10 post tx.  Pt noted to have improved shld flex and scaption with ROM today.    Plan  Plan details: Will follow progressing shoulder stability and decreased pain per protocol.          Timed:         Manual Therapy:         mins  02657;     Therapeutic Exercise:    29     mins  50076;     Neuromuscular Ruth:        mins  32404;    Therapeutic Activity:          mins  79728;     Gait Training:           mins  81818;     Ultrasound:          mins  04446;    Ionto                                   mins   27874  Self Care                            mins   19610  Canalith Repos         mins 55586      Un-Timed:  Electrical Stimulation:    10     mins  35650 ( );  Dry Needling          mins self-pay  Traction          mins 19263      Timed Treatment:   29   mins   Total Treatment:     39   mins    Tim Jackson PT  [place] License: [id number]      Electronically signed by Tim Jackson PT, 23, 9:00 AM EDT

## 2023-05-22 ENCOUNTER — TREATMENT (OUTPATIENT)
Dept: PHYSICAL THERAPY | Facility: CLINIC | Age: 38
End: 2023-05-22
Payer: OTHER GOVERNMENT

## 2023-05-22 DIAGNOSIS — Z98.890 S/P ARTHROSCOPY OF RIGHT SHOULDER: ICD-10-CM

## 2023-05-22 DIAGNOSIS — G89.29 CHRONIC RIGHT SHOULDER PAIN: Primary | ICD-10-CM

## 2023-05-22 DIAGNOSIS — M25.611 DECREASED ROM OF RIGHT SHOULDER: ICD-10-CM

## 2023-05-22 DIAGNOSIS — M25.511 CHRONIC RIGHT SHOULDER PAIN: Primary | ICD-10-CM

## 2023-05-22 PROCEDURE — 97110 THERAPEUTIC EXERCISES: CPT | Performed by: PHYSICAL THERAPIST

## 2023-05-22 PROCEDURE — G0283 ELEC STIM OTHER THAN WOUND: HCPCS | Performed by: PHYSICAL THERAPIST

## 2023-05-22 NOTE — PROGRESS NOTES
Physical Therapy Daily Treatment Note      Patient: Teo Griffith   : 1985  Referring practitioner: Gregorio Thomas DO  Date of Initial Visit: Type: THERAPY  Noted: 2023  Today's Date: 2023  Patient seen for 3 sessions       Visit Diagnoses:    ICD-10-CM ICD-9-CM   1. Chronic right shoulder pain  M25.511 719.41    G89.29 338.29   2. Decreased ROM of right shoulder  M25.611 719.51   3. S/P arthroscopy of right shoulder  Z98.890 V45.89       Subjective Evaluation    History of Present Illness    Subjective comment: Patient notes that he has 4/10 pain today.Pain  Current pain ratin           Objective   See Exercise, Manual, and Modality Logs for complete treatment.       Assessment & Plan     Assessment    Assessment details: Therapy session initiated with there ex and PROM, followed by ice/ESTIM.  No adverse reactions were noted with modalities.  There ex progressed to include increased repetitions and the addition of new exercises to focus on stretching.  Patient was provided with verbal and visual cues to ensure proper form with exercises.  Patient noted mild discomfort at end range PROM, but noted that it was tolerable.  Patient reported no increase in pain post-tx.  He will continue to be progressed per his tolerance and POC.          Timed:         Manual Therapy:         mins  24260;     Therapeutic Exercise:    40     mins  61367;     Neuromuscular Ruth:        mins  60871;    Therapeutic Activity:          mins  00240;     Gait Training:           mins  14961;     Ultrasound:          mins  25492;    Ionto                                   mins   68038  Self Care                            mins   57777      Un-Timed:  Electrical Stimulation:    10     mins  05498 ( );  Dry Needling          mins self-pay  Traction          mins 58740  Canalith Repos         mins 98555    Timed Treatment:   40   mins   Total Treatment:    50    mins    Jacqui Desai, PT  [place] License:  [id number]  Electronically signed by Jacqui Desai, PT, 05/22/23, 12:55 PM EDT.

## 2023-05-30 ENCOUNTER — TREATMENT (OUTPATIENT)
Dept: PHYSICAL THERAPY | Facility: CLINIC | Age: 38
End: 2023-05-30

## 2023-05-30 DIAGNOSIS — M25.611 DECREASED ROM OF RIGHT SHOULDER: ICD-10-CM

## 2023-05-30 DIAGNOSIS — M25.511 CHRONIC RIGHT SHOULDER PAIN: Primary | ICD-10-CM

## 2023-05-30 DIAGNOSIS — Z98.890 S/P ARTHROSCOPY OF RIGHT SHOULDER: ICD-10-CM

## 2023-05-30 DIAGNOSIS — G89.29 CHRONIC RIGHT SHOULDER PAIN: Primary | ICD-10-CM

## 2023-05-30 PROCEDURE — G0283 ELEC STIM OTHER THAN WOUND: HCPCS | Performed by: PHYSICAL THERAPIST

## 2023-05-30 PROCEDURE — 97110 THERAPEUTIC EXERCISES: CPT | Performed by: PHYSICAL THERAPIST

## 2023-05-30 NOTE — PROGRESS NOTES
Physical Therapy Daily Treatment Note      Patient: Teo Griffith   : 1985  Referring practitioner: Gregorio Thomas DO  Date of Initial Visit: Type: THERAPY  Noted: 2023  Today's Date: 2023  Patient seen for 4 sessions       Visit Diagnoses:    ICD-10-CM ICD-9-CM   1. Chronic right shoulder pain  M25.511 719.41    G89.29 338.29   2. Decreased ROM of right shoulder  M25.611 719.51   3. S/P arthroscopy of right shoulder  Z98.890 V45.89       Subjective Evaluation    History of Present Illness    Subjective comment: Pt reports having 2/10 pain today.       Objective   See Exercise, Manual, and Modality Logs for complete treatment.       Assessment & Plan     Assessment    Assessment details: Tx today consisted of exercises for improved scapular stability and shoulder mobility with progression per protocol and ended with ice and estim post session. Pt noted to have improved stability today and demonstrated improvements with shoulder ROM.  Pt reported 3/10 post pain.    Plan  Plan details: Will follow progressing shoulder stability and function per protocol.          Timed:         Manual Therapy:         mins  11844;     Therapeutic Exercise:    39     mins  40112;     Neuromuscular Ruth:        mins  89092;    Therapeutic Activity:          mins  21866;     Gait Training:           mins  55565;     Ultrasound:          mins  49229;    Ionto                                   mins   86417  Self Care                            mins   67492  Canalith Repos         mins 22830      Un-Timed:  Electrical Stimulation:    10     mins  32315 ( );  Dry Needling          mins self-pay  Traction          mins 44704      Timed Treatment:  39    mins   Total Treatment:     49   mins    Tim Jackson PT  [place] License: [id number]      Electronically signed by Tim Jackson PT, 23, 9:03 AM EDT

## 2023-05-31 ENCOUNTER — TREATMENT (OUTPATIENT)
Dept: PHYSICAL THERAPY | Facility: CLINIC | Age: 38
End: 2023-05-31

## 2023-05-31 DIAGNOSIS — M25.611 DECREASED ROM OF RIGHT SHOULDER: ICD-10-CM

## 2023-05-31 DIAGNOSIS — Z98.890 S/P ARTHROSCOPY OF RIGHT SHOULDER: ICD-10-CM

## 2023-05-31 DIAGNOSIS — M25.511 CHRONIC RIGHT SHOULDER PAIN: Primary | ICD-10-CM

## 2023-05-31 DIAGNOSIS — G89.29 CHRONIC RIGHT SHOULDER PAIN: Primary | ICD-10-CM

## 2023-05-31 PROCEDURE — 97110 THERAPEUTIC EXERCISES: CPT | Performed by: PHYSICAL THERAPIST

## 2023-05-31 NOTE — PROGRESS NOTES
Physical Therapy Daily Treatment Note      Patient: Teo Griffith   : 1985  Referring practitioner: Gregorio Thomas DO  Date of Initial Visit: Type: THERAPY  Noted: 2023  Today's Date: 2023  Patient seen for 5 sessions       Visit Diagnoses:    ICD-10-CM ICD-9-CM   1. Chronic right shoulder pain  M25.511 719.41    G89.29 338.29   2. Decreased ROM of right shoulder  M25.611 719.51   3. S/P arthroscopy of right shoulder  Z98.890 V45.89       Subjective: Patient states his shoulder is sore this morning, and feels it may be due to the way he slept last night. Pt reports he experienced some soreness following yesterday's session, but notes tolerable. Pt request to abbreviate tx briefly due to conflicting appointment.     Objective   See Exercise, Manual, and Modality Logs for complete treatment.       Assessment/Plan: Patient completed today's session with no changes in pain following, 4/10. Treatment consisted of therex as listed f/b shoulder PROM and cryotherapy at conclusion. Exercise performed to assist with restoring right shoulder ROM, improving scapular stability and to assist with ease of performing ADL's. Activities progressed to include supine wand in flexion. Pt observed with good tolerance and was provided with verbal and tactile cues for form. Pt continues to benefit from therapy services and will be progressed as tolerated, per protocol. Session abbreviated per patient request due to conflicting appointment. No adverse reactions observed during, and/ or following tx. Continue with PT's POC.       Timed:         Manual Therapy:         mins  93368;     Therapeutic Exercise:   39      mins  63023;     Neuromuscular Ruth:        mins  20801;    Therapeutic Activity:          mins  24421;     Gait Training:           mins  12406;     Ultrasound:          mins  13004;    Ionto                                   mins   18934  Self Care                            mins    59962      Un-Timed:  Electrical Stimulation:         mins  40065 ( );  Dry Needling          mins self-pay  Traction          mins 34659  Canalith Repos         mins 55726    Timed Treatment:   39   mins   Total Treatment:     44   mins (CP: x 5min)     Natalie Smith. Linda, ELHAM  [place] License: [id number]

## 2023-06-05 ENCOUNTER — TREATMENT (OUTPATIENT)
Dept: PHYSICAL THERAPY | Facility: CLINIC | Age: 38
End: 2023-06-05
Payer: OTHER GOVERNMENT

## 2023-06-05 DIAGNOSIS — Z98.890 S/P ARTHROSCOPY OF RIGHT SHOULDER: ICD-10-CM

## 2023-06-05 DIAGNOSIS — M25.511 CHRONIC RIGHT SHOULDER PAIN: Primary | ICD-10-CM

## 2023-06-05 DIAGNOSIS — M25.611 DECREASED ROM OF RIGHT SHOULDER: ICD-10-CM

## 2023-06-05 DIAGNOSIS — G89.29 CHRONIC RIGHT SHOULDER PAIN: Primary | ICD-10-CM

## 2023-06-05 PROCEDURE — G0283 ELEC STIM OTHER THAN WOUND: HCPCS | Performed by: PHYSICAL THERAPIST

## 2023-06-05 PROCEDURE — 97530 THERAPEUTIC ACTIVITIES: CPT | Performed by: PHYSICAL THERAPIST

## 2023-06-05 PROCEDURE — 97110 THERAPEUTIC EXERCISES: CPT | Performed by: PHYSICAL THERAPIST

## 2023-06-05 NOTE — PROGRESS NOTES
Physical Therapy Re Certification Of Plan of Care  Patient: Teo Griffith   : 1985  Diagnosis/ICD-10 Code:  Chronic right shoulder pain [M25.511, G89.29]  Referring practitioner: Gregorio Thomas DO  Date of Initial Visit: Type: THERAPY  Noted: 2023  Today's Date: 2023  Patient seen for 6 sessions         Visit Diagnoses:    ICD-10-CM ICD-9-CM   1. Chronic right shoulder pain  M25.511 719.41    G89.29 338.29   2. Decreased ROM of right shoulder  M25.611 719.51   3. S/P arthroscopy of right shoulder  Z98.890 V45.89         Teo Griffith reports:   Subjective Questionnaire:   Clinical Progress: improved  Home Program Compliance: Yes  Treatment has included: therapeutic exercise, neuromuscular re-education, manual therapy, therapeutic activity, electrical stimulation, ultrasound, moist heat, and cryotherapy      Subjective Evaluation    History of Present Illness    Subjective comment: Pt reports having improvements with dressing bathing and light lifting. 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.Pain  Current pain rating: 3  At best pain ratin  At worst pain ratin           Objective          Passive Range of Motion     Right Shoulder   Flexion: 150 degrees   Abduction: 145 degrees   External rotation 45°: 45 degrees   Internal rotation 45°: 60 degrees     Strength/Myotome Testing     Right Shoulder     Planes of Motion   Flexion: 3+   Abduction: 3+     Right Wrist/Hand      (2nd hand position)     Trial 1: 90 lbs    Trial 2: 90 lbs    Trial 3: 90 lbs    Average: 90 lbs        Assessment & Plan     Assessment  Impairments: abnormal muscle firing, abnormal or restricted ROM, activity intolerance, impaired physical strength, lacks appropriate home exercise program, pain with function and safety issue  Functional Limitations: carrying objects, lifting, sleeping, pulling, pushing, uncomfortable because of pain, stooping,  reaching behind back and reaching overhead  Assessment details: Pt is a 38 y/o male evaluated for treatment following a right shoulder arthroscopy.  Pt has attended 6 sessions with noted gains for ROM, strength and functional mobility.  Pt reports having less pain and will cont to be followed for improved stability.  Prognosis: good    Goals  Plan Goals: STG 6 weeks    1 Pt will be instructed in a HEP.met  2 Pt will report a 50% decrease in radicular symptoms.partially met  3 Pt will demonstrate 120 degree of arom shoulder flexion.not met  Pt will improve his Quick Dash to less than 40%. NT      LTG 12 weeks    1 Pt will improve his Mod Oswestry to less than 20%.deferred  2 Pt will report back pain no greater than 3/10 with moderate lifting.deferred  3 Pt will demonstrate 4/5 right shoulder gross strength.not met  4 Pt will improve his Quick Dash score to less than 15%.NT  5 Pt will demonstrate 140 degrees of AROM flexion with no distress.Not met    Plan  Therapy options: will be seen for skilled therapy services  Planned modality interventions: cryotherapy, TENS, thermotherapy (hydrocollator packs), traction and ultrasound  Planned therapy interventions: abdominal trunk stabilization, ADL retraining, body mechanics training, flexibility, functional ROM exercises, home exercise program, IADL retraining, joint mobilization, manual therapy, neuromuscular re-education, postural training, soft tissue mobilization, strengthening, stretching and therapeutic activities  Frequency: 2x week  Duration in weeks: 8  Treatment plan discussed with: patient  Plan details: Will follow for optimal gains.  Moderate Evaluation  62346  Re-evaluation   08031  Therapeutic exercise  93507  Therapeutic activity    75956  Neuromuscular re-education   07051  Manual therapy   10622  Gait training  54568  Unattended e-stim (Medicaid/Medicare)     Moist heat/cryotherapy 24462   Ultrasound   21872  Mechanical traction  10096           Recommendations: Continue as planned  Timeframe: 2 months  Prognosis to achieve goals: good      Timed:         Manual Therapy:         mins  59987;     Therapeutic Exercise:    31     mins  08139;     Neuromuscular Ruth:        mins  50761;    Therapeutic Activity:     11     mins  18503;     Gait Training:           mins  22709;     Ultrasound:          mins  91374;    Ionto                                   mins   60262  Self Care                            mins   76703    Un-Timed:  Electrical Stimulation:   10      mins  68876 (MC );  Dry Needling          mins self-pay  Traction          mins 20527  Re-Eval                               mins  82468  Canalith Repos         mins 16540    Timed Treatment:  42    mins   Total Treatment:     52   mins      Assisted by Natalie Mckeon PTA    PT: Tim Jackson PT     [place] License:  [id number]    Electronically signed by Tim Jackson PT, 06/05/23, 9:55 AM EDT    Certification Period: 6/5/2023 thru 9/2/2023  I certify that the therapy services are furnished while this patient is under my care.  The services outlined above are required by this patient, and will be reviewed every 90 days.         Physician Signature:__________________________________________________    PHYSICIAN: Gregorio Thomas DO  NPI: [id number]                                      DATE:  :     Please sign and return via fax to [email or web address] . Thank you, [facility]

## 2023-06-07 ENCOUNTER — TREATMENT (OUTPATIENT)
Dept: PHYSICAL THERAPY | Facility: CLINIC | Age: 38
End: 2023-06-07
Payer: OTHER GOVERNMENT

## 2023-06-07 DIAGNOSIS — Z98.890 S/P ARTHROSCOPY OF RIGHT SHOULDER: ICD-10-CM

## 2023-06-07 DIAGNOSIS — G89.29 CHRONIC RIGHT SHOULDER PAIN: Primary | ICD-10-CM

## 2023-06-07 DIAGNOSIS — M25.511 CHRONIC RIGHT SHOULDER PAIN: Primary | ICD-10-CM

## 2023-06-07 DIAGNOSIS — M25.611 DECREASED ROM OF RIGHT SHOULDER: ICD-10-CM

## 2023-06-07 PROCEDURE — 97530 THERAPEUTIC ACTIVITIES: CPT | Performed by: PHYSICAL THERAPIST

## 2023-06-07 PROCEDURE — 97110 THERAPEUTIC EXERCISES: CPT | Performed by: PHYSICAL THERAPIST

## 2023-06-07 PROCEDURE — G0283 ELEC STIM OTHER THAN WOUND: HCPCS | Performed by: PHYSICAL THERAPIST

## 2023-06-07 NOTE — PROGRESS NOTES
Physical Therapy Daily Treatment Note      Patient: Teo Griffith   : 1985  Referring practitioner: Gregorio Thomas DO  Date of Initial Visit: Type: THERAPY  Noted: 2023  Today's Date: 2023  Patient seen for 7 sessions       Visit Diagnoses:    ICD-10-CM ICD-9-CM   1. Chronic right shoulder pain  M25.511 719.41    G89.29 338.29   2. Decreased ROM of right shoulder  M25.611 719.51   3. S/P arthroscopy of right shoulder  Z98.890 V45.89       Subjective: Patient states his shoulder is a little sore today, possibly due to the rainy weather and increase in exercise during last session. Pt reports 4/10 pain prior to tx.      Objective   See Exercise, Manual, and Modality Logs for complete treatment.       Assessment/Plan: Patient responded well to today's session with no changes in shoulder pain noted pre and post tx. Pt performed therex and therapeutic activities per flow sheet to assist with restoring right shoulder ROM, improving strength and to restore ability to perform functional task. Shoulder PROM and modalities performed at following. Exercise progressed with resistance of one pound hand weight added to sternal lifts/ lawnmowers, and mid row with theraband initiated. Pt observed with good tolerance to new added activities; however, continues to require assist of opposing UE with wall slide. Pt continues to benefit from therapy services and will be progressed as tolerated to address goals, reduce pain and improve range of motion. No adverse reactions observed during, and/ or following tx. Continue with PT's POC.       Timed:         Manual Therapy:         mins  19817;     Therapeutic Exercise:    34     mins  71304;     Neuromuscular Ruth:        mins  18021;    Therapeutic Activity:     10     mins  51388;     Gait Training:           mins  38768;     Ultrasound:          mins  74729;    Ionto                                   mins   20042  Self Care                            mins    90232      Un-Timed:  Electrical Stimulation:     10    mins  49656 ( );  Dry Needling          mins self-pay  Traction          mins 79207  Canalith Repos         mins 65159    Timed Treatment:  44    mins   Total Treatment:    54    mins    Natalie Smith. Linda, ELHAM  [place] License: [id number]

## 2023-06-12 ENCOUNTER — TREATMENT (OUTPATIENT)
Dept: PHYSICAL THERAPY | Facility: CLINIC | Age: 38
End: 2023-06-12
Payer: OTHER GOVERNMENT

## 2023-06-12 DIAGNOSIS — M25.611 DECREASED ROM OF RIGHT SHOULDER: ICD-10-CM

## 2023-06-12 DIAGNOSIS — M25.511 CHRONIC RIGHT SHOULDER PAIN: Primary | ICD-10-CM

## 2023-06-12 DIAGNOSIS — G89.29 CHRONIC RIGHT SHOULDER PAIN: Primary | ICD-10-CM

## 2023-06-12 DIAGNOSIS — Z98.890 S/P ARTHROSCOPY OF RIGHT SHOULDER: ICD-10-CM

## 2023-06-12 PROCEDURE — 97530 THERAPEUTIC ACTIVITIES: CPT | Performed by: PHYSICAL THERAPIST

## 2023-06-12 PROCEDURE — G0283 ELEC STIM OTHER THAN WOUND: HCPCS | Performed by: PHYSICAL THERAPIST

## 2023-06-12 PROCEDURE — 97110 THERAPEUTIC EXERCISES: CPT | Performed by: PHYSICAL THERAPIST

## 2023-06-12 NOTE — PROGRESS NOTES
Physical Therapy Daily Treatment Note      Patient: Teo Griffith   : 1985  Referring practitioner: Gregorio Thomas DO  Date of Initial Visit: Type: THERAPY  Noted: 2023  Today's Date: 2023  Patient seen for 8 sessions       Visit Diagnoses:    ICD-10-CM ICD-9-CM   1. Chronic right shoulder pain  M25.511 719.41    G89.29 338.29   2. Decreased ROM of right shoulder  M25.611 719.51   3. S/P arthroscopy of right shoulder  Z98.890 V45.89       Subjective Evaluation    History of Present Illness    Subjective comment: Patient reports that his shoulder is sore today, noting that he believes it is from the rainy weather and from maybe sleeping on it some at night.  He has a follow-up appt with referring MD tomorrow.Pain  Current pain rating: 3         Objective   See Exercise, Manual, and Modality Logs for complete treatment.       Assessment & Plan     Assessment    Assessment details: Therapy session initiated with there ex and therapeutic activities, per flow sheet.  Exercises progressed to include increased repetitions.  Patient was able to perform wall slides without left UE assist today.  Mild soreness reported with PROM, when nearing end ROM.  Session concluded with ice/ESTIM, with no adverse reactions.  He noted no change in pain at conclusion of session.  He will continue to be progressed per his tolerance and POC.      Timed:         Manual Therapy:         mins  31796;     Therapeutic Exercise:    35     mins  42109;     Neuromuscular Ruth:        mins  24139;    Therapeutic Activity:     10     mins  66330;     Gait Training:           mins  70555;     Ultrasound:          mins  26966;    Ionto                                   mins   25146  Self Care                            mins   16451      Un-Timed:  Electrical Stimulation:    10     mins  42561 ( );  Dry Needling          mins self-pay  Traction          mins 71655  Canalith Repos         mins 64584    Timed Treatment:   45    mins   Total Treatment:     55  mins    Jacqui Desai, AXEL  [place] License: [id number]  Electronically signed by Jacqui Desai PT, 06/12/23, 10:01 AM EDT.

## 2023-06-14 ENCOUNTER — TREATMENT (OUTPATIENT)
Dept: PHYSICAL THERAPY | Facility: CLINIC | Age: 38
End: 2023-06-14
Payer: OTHER GOVERNMENT

## 2023-06-14 DIAGNOSIS — M25.611 DECREASED ROM OF RIGHT SHOULDER: ICD-10-CM

## 2023-06-14 DIAGNOSIS — M25.511 CHRONIC RIGHT SHOULDER PAIN: Primary | ICD-10-CM

## 2023-06-14 DIAGNOSIS — G89.29 CHRONIC RIGHT SHOULDER PAIN: Primary | ICD-10-CM

## 2023-06-14 DIAGNOSIS — Z98.890 S/P ARTHROSCOPY OF RIGHT SHOULDER: ICD-10-CM

## 2023-06-14 PROCEDURE — 97110 THERAPEUTIC EXERCISES: CPT | Performed by: PHYSICAL THERAPIST

## 2023-06-14 PROCEDURE — 97140 MANUAL THERAPY 1/> REGIONS: CPT | Performed by: PHYSICAL THERAPIST

## 2023-06-14 NOTE — PROGRESS NOTES
Physical Therapy Daily Treatment Note      Patient: Teo Griffith   : 1985  Referring practitioner: Gregorio Thomas DO  Date of Initial Visit: Type: THERAPY  Noted: 2023  Today's Date: 2023  Patient seen for 9 sessions       Visit Diagnoses:    ICD-10-CM ICD-9-CM   1. Chronic right shoulder pain  M25.511 719.41    G89.29 338.29   2. Decreased ROM of right shoulder  M25.611 719.51   3. S/P arthroscopy of right shoulder  Z98.890 V45.89       Subjective Evaluation    History of Present Illness    Subjective comment: Pt reports having 3/10 pain today.     Objective   See Exercise, Manual, and Modality Logs for complete treatment.       Assessment & Plan     Assessment    Assessment details: Tx today consisted of postural stability and shoulder mobility progressed with added reps; followed by manual stretching to shoulder and ended with ice and estim for decreased pain.  Pt noted to have improvements with wall slides, supine punches and wand.  Pt noted to have improvements with shoulder ROM. Pt reported 3/10 post pain.    Plan  Plan details: Will follow progressing shoulder stability and improved function.        Timed:         Manual Therapy:         mins  70718;     Therapeutic Exercise:    32     mins  24125;     Neuromuscular Ruth:        mins  15912;    Therapeutic Activity:          mins  76429;     Gait Training:           mins  46378;     Ultrasound:          mins  48605;    Ionto                                   mins   60774  Self Care                            mins   76293  Canalith Repos         mins 66313      Un-Timed:  Electrical Stimulation:    10     mins  97755 ( );  Dry Needling          mins self-pay  Traction          mins 10826      Timed Treatment:   32   mins   Total Treatment:     42   mins    Tim Jackson PT  [place] License: [id number]      Electronically signed by Tim Jackson PT, 23, 12:11 PM EDT

## 2023-07-31 ENCOUNTER — TREATMENT (OUTPATIENT)
Dept: PHYSICAL THERAPY | Facility: CLINIC | Age: 38
End: 2023-07-31
Payer: OTHER GOVERNMENT

## 2023-07-31 DIAGNOSIS — M25.511 CHRONIC RIGHT SHOULDER PAIN: Primary | ICD-10-CM

## 2023-07-31 DIAGNOSIS — G89.29 CHRONIC RIGHT SHOULDER PAIN: Primary | ICD-10-CM

## 2023-07-31 DIAGNOSIS — Z98.890 S/P ARTHROSCOPY OF RIGHT SHOULDER: ICD-10-CM

## 2023-07-31 DIAGNOSIS — M25.611 DECREASED ROM OF RIGHT SHOULDER: ICD-10-CM

## 2023-07-31 PROCEDURE — 97530 THERAPEUTIC ACTIVITIES: CPT | Performed by: PHYSICAL THERAPIST

## 2023-07-31 PROCEDURE — G0283 ELEC STIM OTHER THAN WOUND: HCPCS | Performed by: PHYSICAL THERAPIST

## 2023-07-31 PROCEDURE — 97110 THERAPEUTIC EXERCISES: CPT | Performed by: PHYSICAL THERAPIST

## 2023-08-02 ENCOUNTER — TREATMENT (OUTPATIENT)
Dept: PHYSICAL THERAPY | Facility: CLINIC | Age: 38
End: 2023-08-02
Payer: OTHER GOVERNMENT

## 2023-08-02 DIAGNOSIS — M25.611 DECREASED ROM OF RIGHT SHOULDER: ICD-10-CM

## 2023-08-02 DIAGNOSIS — M25.511 CHRONIC RIGHT SHOULDER PAIN: Primary | ICD-10-CM

## 2023-08-02 DIAGNOSIS — M54.41 CHRONIC BILATERAL LOW BACK PAIN WITH RIGHT-SIDED SCIATICA: ICD-10-CM

## 2023-08-02 DIAGNOSIS — G89.29 CHRONIC BILATERAL LOW BACK PAIN WITH RIGHT-SIDED SCIATICA: ICD-10-CM

## 2023-08-02 DIAGNOSIS — Z98.890 S/P ARTHROSCOPY OF RIGHT SHOULDER: ICD-10-CM

## 2023-08-02 DIAGNOSIS — G89.29 CHRONIC RIGHT SHOULDER PAIN: Primary | ICD-10-CM

## 2023-08-07 ENCOUNTER — TREATMENT (OUTPATIENT)
Dept: PHYSICAL THERAPY | Facility: CLINIC | Age: 38
End: 2023-08-07
Payer: OTHER GOVERNMENT

## 2023-08-07 DIAGNOSIS — M25.611 DECREASED ROM OF RIGHT SHOULDER: ICD-10-CM

## 2023-08-07 DIAGNOSIS — G89.29 CHRONIC RIGHT SHOULDER PAIN: Primary | ICD-10-CM

## 2023-08-07 DIAGNOSIS — Z98.890 S/P ARTHROSCOPY OF RIGHT SHOULDER: ICD-10-CM

## 2023-08-07 DIAGNOSIS — M25.511 CHRONIC RIGHT SHOULDER PAIN: Primary | ICD-10-CM

## 2023-08-07 PROCEDURE — 97530 THERAPEUTIC ACTIVITIES: CPT | Performed by: PHYSICAL THERAPIST

## 2023-08-07 PROCEDURE — 97110 THERAPEUTIC EXERCISES: CPT | Performed by: PHYSICAL THERAPIST

## 2023-08-07 PROCEDURE — G0283 ELEC STIM OTHER THAN WOUND: HCPCS | Performed by: PHYSICAL THERAPIST

## 2023-08-07 NOTE — PROGRESS NOTES
Physical Therapy Daily Treatment Note      Patient: Teo Griffith   : 1985  Referring practitioner: Gregorio Thomas DO  Date of Initial Visit: Type: THERAPY  Noted: 2023  Today's Date: 2023  Patient seen for 21 sessions       Visit Diagnoses:    ICD-10-CM ICD-9-CM   1. Chronic right shoulder pain  M25.511 719.41    G89.29 338.29   2. Decreased ROM of right shoulder  M25.611 719.51   3. S/P arthroscopy of right shoulder  Z98.890 V45.89       Subjective: Patient reports 3/10 right shoulder pain prior to tx. Pt states he feels he could have slept on his shoulder wrong which has caused increased soreness.      Objective   See Exercise, Manual, and Modality Logs for complete treatment.       Assessment/Plan: Today's treatment consisted of therex and therapeutic activities per flow sheet for right shoulder f/b shoulder PROM and modalities at conclusion. Activities performed to assist with range of motion and strength deficits, and to assist with return of functional and work tasks. Exercise progressed with shoulder external and internal rotation with theraband initiated, and closed chain circles on wall. Pt observed with good tolerance and was provided with cues and demonstration for form. Pt will be progressed with therapy as tolerated to address goals, reduce pain and improve strength. No adverse reactions or signs of distress observed during, and/ or following tx. Continue with PT's POC.       Conclusion of session assisted by Tim Jackson PT.      Timed:         Manual Therapy:         mins  21515;     Therapeutic Exercise:   36      mins  12900;     Neuromuscular Ruth:        mins  80053;    Therapeutic Activity:     10     mins  75648;     Gait Training:           mins  27307;     Ultrasound:          mins  10635;    Ionto                                   mins   77414  Self Care                            mins   16296      Un-Timed:  Electrical Stimulation:    10     mins  87036 ( );  Dry  Needling          mins self-pay  Traction          mins 21296  Canalith Repos         mins 47607    Timed Treatment:   46   mins   Total Treatment:    56    mins    Natalie Smith. ELHAM Mckeon  [place] License: [id number]

## 2023-08-09 ENCOUNTER — TREATMENT (OUTPATIENT)
Dept: PHYSICAL THERAPY | Facility: CLINIC | Age: 38
End: 2023-08-09
Payer: OTHER GOVERNMENT

## 2023-08-09 DIAGNOSIS — M25.511 CHRONIC RIGHT SHOULDER PAIN: Primary | ICD-10-CM

## 2023-08-09 DIAGNOSIS — M25.611 DECREASED ROM OF RIGHT SHOULDER: ICD-10-CM

## 2023-08-09 DIAGNOSIS — Z98.890 S/P ARTHROSCOPY OF RIGHT SHOULDER: ICD-10-CM

## 2023-08-09 DIAGNOSIS — G89.29 CHRONIC RIGHT SHOULDER PAIN: Primary | ICD-10-CM

## 2023-08-09 PROCEDURE — G0283 ELEC STIM OTHER THAN WOUND: HCPCS | Performed by: PHYSICAL THERAPIST

## 2023-08-09 PROCEDURE — 97110 THERAPEUTIC EXERCISES: CPT | Performed by: PHYSICAL THERAPIST

## 2023-08-09 PROCEDURE — 97530 THERAPEUTIC ACTIVITIES: CPT | Performed by: PHYSICAL THERAPIST

## 2023-08-09 NOTE — PROGRESS NOTES
Physical Therapy Daily Treatment Note      Patient: Teo Griffith   : 1985  Referring practitioner: Gregorio Thomas DO  Date of Initial Visit: Type: THERAPY  Noted: 2023  Today's Date: 2023  Patient seen for 22 sessions       Visit Diagnoses:    ICD-10-CM ICD-9-CM   1. Chronic right shoulder pain  M25.511 719.41    G89.29 338.29   2. Decreased ROM of right shoulder  M25.611 719.51   3. S/P arthroscopy of right shoulder  Z98.890 V45.89       Subjective: Patient reports 3/10 right shoulder pain prior to tx. Patient states he tolerated progressed activities in last session well with good response. Pt feels he is using his shoulder more with daily task.    Objective   See Exercise, Manual, and Modality Logs for complete treatment.       Assessment/Plan: Today's treatment initiated with therex and therapeutic activities per flow sheet for improved shoulder stability, ROM, and to assist with performance of functional tasks. Shoulder PROM and modalities completed following. Activities progressed with additional strengthening and closed chain stability exercise initiated. Pt observed with good tolerance w/ some fatigue. Pt provided with hands on cueing and demonstration for form and for max benefit as necessary. Pt continues to benefit from therapy services, per protocol, to address goals, reduce pain and restore function. No adverse reactions observed during, and/ or following tx. Continue with PT's POC.      Timed:         Manual Therapy:         mins  90983;     Therapeutic Exercise:   31      mins  08455;     Neuromuscular Ruth:        mins  59483;    Therapeutic Activity:     13     mins  02879;     Gait Training:           mins  91914;     Ultrasound:          mins  33356;    Ionto                                   mins   32649  Self Care                            mins   70678      Un-Timed:  Electrical Stimulation:    10     mins  04222 ( );  Dry Needling          mins self-pay  Traction           mins 29615  [facility]         mins 60427    Timed Treatment:  44    mins   Total Treatment:     54   mins    Natalie Smith. ELHAM Mckeon  [place] License: [id number]

## 2023-08-14 ENCOUNTER — TREATMENT (OUTPATIENT)
Dept: PHYSICAL THERAPY | Facility: CLINIC | Age: 38
End: 2023-08-14
Payer: OTHER GOVERNMENT

## 2023-08-14 DIAGNOSIS — M25.511 CHRONIC RIGHT SHOULDER PAIN: Primary | ICD-10-CM

## 2023-08-14 DIAGNOSIS — G89.29 CHRONIC RIGHT SHOULDER PAIN: Primary | ICD-10-CM

## 2023-08-14 DIAGNOSIS — Z98.890 S/P ARTHROSCOPY OF RIGHT SHOULDER: ICD-10-CM

## 2023-08-14 DIAGNOSIS — M25.611 DECREASED ROM OF RIGHT SHOULDER: ICD-10-CM

## 2023-08-14 PROCEDURE — 97110 THERAPEUTIC EXERCISES: CPT | Performed by: PHYSICAL THERAPIST

## 2023-08-14 PROCEDURE — 97530 THERAPEUTIC ACTIVITIES: CPT | Performed by: PHYSICAL THERAPIST

## 2023-08-14 PROCEDURE — G0283 ELEC STIM OTHER THAN WOUND: HCPCS | Performed by: PHYSICAL THERAPIST

## 2023-08-14 NOTE — PROGRESS NOTES
Physical Therapy Daily Treatment Note      Patient: Teo Griffith   : 1985  Referring practitioner: Gregorio Thomas DO  Date of Initial Visit: Type: THERAPY  Noted: 2023  Today's Date: 2023  Patient seen for 23 sessions       Visit Diagnoses:    ICD-10-CM ICD-9-CM   1. Chronic right shoulder pain  M25.511 719.41    G89.29 338.29   2. Decreased ROM of right shoulder  M25.611 719.51   3. S/P arthroscopy of right shoulder  Z98.890 V45.89       Subjective: Patient reports 3/10 right shoulder pain prior to tx. Pt states he feels the rainy weather attributes to his pain.      Objective   See Exercise, Manual, and Modality Logs for complete treatment.       Assessment/Plan: Patient completed today's session with no complaints of increase in shoulder pain following. Therex and therapeutic activities performed per flow sheet f/b shoulder PROM and cryotherapy with Estim at conclusion. Exercise progressed with UE bike initiated, and resistance of tband increased from yellow to red with shoulder external and internal rotation. Pt observed with good tolerance, and was provided with demonstration for form. Pt continues to benefit from therapy services and will be progressed as tolerated to address goals, achieve maximum function and improve strength. No adverse reactions or signs of distress observed during, and/ or following tx. Continue with PT's POC.       Timed:         Manual Therapy:         mins  67778;     Therapeutic Exercise:   36      mins  38864;     Neuromuscular Ruth:        mins  54238;    Therapeutic Activity:    12      mins  03729;     Gait Training:           mins  04837;     Ultrasound:          mins  24925;    Ionto                                   mins   82671  Self Care                            mins   91045      Un-Timed:  Electrical Stimulation:    10     mins  42970 ( );  Dry Needling          mins self-pay  Traction          mins 21059  Canalith Repos         mins  01627    Timed Treatment:   48   mins   Total Treatment:    58    mins    Natalie Smith. ELHAM Mckeon  [place] License: [id number]

## 2023-08-16 ENCOUNTER — TREATMENT (OUTPATIENT)
Dept: PHYSICAL THERAPY | Facility: CLINIC | Age: 38
End: 2023-08-16
Payer: OTHER GOVERNMENT

## 2023-08-16 DIAGNOSIS — Z98.890 S/P ARTHROSCOPY OF RIGHT SHOULDER: ICD-10-CM

## 2023-08-16 DIAGNOSIS — M25.511 CHRONIC RIGHT SHOULDER PAIN: Primary | ICD-10-CM

## 2023-08-16 DIAGNOSIS — G89.29 CHRONIC RIGHT SHOULDER PAIN: Primary | ICD-10-CM

## 2023-08-16 DIAGNOSIS — M25.611 DECREASED ROM OF RIGHT SHOULDER: ICD-10-CM

## 2023-08-16 PROCEDURE — 97110 THERAPEUTIC EXERCISES: CPT | Performed by: PHYSICAL THERAPIST

## 2023-08-16 PROCEDURE — 97530 THERAPEUTIC ACTIVITIES: CPT | Performed by: PHYSICAL THERAPIST

## 2023-08-16 NOTE — PROGRESS NOTES
Physical Therapy Daily Treatment Note      Patient: Teo Griffith   : 1985  Referring practitioner: Gregorio Thomas DO  Date of Initial Visit: Type: THERAPY  Noted: 2023  Today's Date: 2023  Patient seen for 24 sessions       Visit Diagnoses:    ICD-10-CM ICD-9-CM   1. Chronic right shoulder pain  M25.511 719.41    G89.29 338.29   2. Decreased ROM of right shoulder  M25.611 719.51   3. S/P arthroscopy of right shoulder  Z98.890 V45.89       Subjective: Patient states he had f/u appointment with referring ortho yesterday, and received recommendation to continue with therapy. Pt reports MD was pleased with his progress and he is scheduled to f/u again in December.       Objective   See Exercise, Manual, and Modality Logs for complete treatment.       Assessment/Plan: Patient demonstrated good effort during today's session and had no complaints of pain increase following, 2/10. Treatment performed including therex and therapeutic activities as listed f/b shoulder PROM and cryotherapy at conclusion. Exercise completed with continued emphasis on improved right shoulder ROM, improved right UE strength and scapular stability. Activities progressed with strengthening reps increased as tolerated. Pt observed with improved shoulder external and internal during passive range of motion. Pt continues to require intermittent cues to maintain form and for improved scapular awareness during exercise. Pt will be progressed as tolerated. No adverse reactions or signs of distress observed during, and/ or following tx. Continue with PT's POC.       Timed:         Manual Therapy:         mins  63492;     Therapeutic Exercise:    36     mins  89471;     Neuromuscular Ruth:        mins  41673;    Therapeutic Activity:     14     mins  72440;     Gait Training:           mins  21771;     Ultrasound:          mins  76167;    Ionto                                   mins   70829  Self Care                            mins    64521      Un-Timed:  Electrical Stimulation:         mins  03753 ( );  Dry Needling          mins self-pay  Traction          mins 99398  Canalith Repos         mins 75353    Timed Treatment:  50    mins   Total Treatment:    58    mins (CP: x 8 min)     Natalie Smith. Linda, ELHAM  [place] License: [id number]

## 2023-08-21 ENCOUNTER — TREATMENT (OUTPATIENT)
Dept: PHYSICAL THERAPY | Facility: CLINIC | Age: 38
End: 2023-08-21
Payer: OTHER GOVERNMENT

## 2023-08-21 DIAGNOSIS — M25.611 DECREASED ROM OF RIGHT SHOULDER: ICD-10-CM

## 2023-08-21 DIAGNOSIS — G89.29 CHRONIC RIGHT SHOULDER PAIN: Primary | ICD-10-CM

## 2023-08-21 DIAGNOSIS — M25.511 CHRONIC RIGHT SHOULDER PAIN: Primary | ICD-10-CM

## 2023-08-21 DIAGNOSIS — Z98.890 S/P ARTHROSCOPY OF RIGHT SHOULDER: ICD-10-CM

## 2023-08-21 PROCEDURE — 97110 THERAPEUTIC EXERCISES: CPT | Performed by: PHYSICAL THERAPIST

## 2023-08-21 PROCEDURE — 97530 THERAPEUTIC ACTIVITIES: CPT | Performed by: PHYSICAL THERAPIST

## 2023-08-21 NOTE — PROGRESS NOTES
Physical Therapy Daily Treatment Note      Patient: Teo Griffith   : 1985  Referring practitioner: Gregorio Thomas DO  Date of Initial Visit: Type: THERAPY  Noted: 2023  Today's Date: 2023  Patient seen for 25 sessions       Visit Diagnoses:    ICD-10-CM ICD-9-CM   1. Chronic right shoulder pain  M25.511 719.41    G89.29 338.29   2. Decreased ROM of right shoulder  M25.611 719.51   3. S/P arthroscopy of right shoulder  Z98.890 V45.89   4. Chronic bilateral low back pain with right-sided sciatica  M54.41 724.2    G89.29 724.3     338.29       Subjective Evaluation    Pain  Current pain rating: 3    : Patient states his shoulder has been aggravated the past few days; however, he notes of no change in activity. Pt reports he has also been having some numbness and tingling in the right hand, specifically the third through fifth fingers. No additional symptoms reported.     Objective   See Exercise, Manual, and Modality Logs for complete treatment.       Assessment/Plan: Supervising therapist, Tim Jackson, PT notified and aware of pt's subjective reports. PT also assisted with initiation of tx. Patient completed session with no change in pain noted following, 3/10. Treatment performed including therex and therapeutic activities per flow sheet f/b shoulder PROM combined with posterior/ inferior glides and cryotherapy at conclusion. Pt observed with good tolerance. Exercise progressed with additional closed chain activities added including wall pushups and supine chin tucks initiated to assist with improved cervical/ postural stability. Pt observed with good tolerance and was provided with cues and demonstration for form. Pt will be progressed with therapy as tolerated to address goals, reduce pain and improve shoulder strength/ ROM. No adverse reactions or signs of distress observed during, and/or following tx. Continue with PT's POC and will continue to monitor pt's symptoms.        Timed:          Manual Therapy:   5      mins  69778;     Therapeutic Exercise:         mins  12446;     Neuromuscular Ruth:        mins  75794;    Therapeutic Activity:          mins  22659;     Gait Training:           mins  53110;     Ultrasound:          mins  28826;    Ionto                                   mins   66623  Self Care                            mins   03504      Un-Timed:  Electrical Stimulation:         mins  75248 ( );  Dry Needling          mins self-pay  Traction          mins 28390  Canalith Repos         mins 80097    Timed Treatment:      mins   Total Treatment:        mins CP: x 10 min)     Natalie Smith. Linda [facility] License: [id number]

## 2023-08-21 NOTE — PROGRESS NOTES
Physical Therapy Daily Treatment Note      Patient: Teo Griffith   : 1985  Referring practitioner: Gregorio Thomas DO  Date of Initial Visit: Type: THERAPY  Noted: 2023  Today's Date: 2023  Patient seen for 25 sessions       Visit Diagnoses:    ICD-10-CM ICD-9-CM   1. Chronic right shoulder pain  M25.511 719.41    G89.29 338.29   2. Decreased ROM of right shoulder  M25.611 719.51   3. S/P arthroscopy of right shoulder  Z98.890 V45.89       Subjective  Patient states his shoulder has been aggravated the past few days; however, he notes of no change in activity. Pt reports he has also been having some numbness and tingling in the right hand, specifically the third through fifth fingers. No other additional symptoms reported.     Objective   See Exercise, Manual, and Modality Logs for complete treatment.       Assessment/Plan: Supervising therapist, Tim Jackson, PT notified and aware of pt's subjective reports. PT also assisted with initiation of tx. Patient completed session with no changes in pain noted following, 3/10. Treatment performed including therex and therapeutic activities per flow sheet f/b shoulder PROM combined with posterior/ inferior glides and cryotherapy at conclusion. Pt observed with good tolerance. Exercise progressed with additional closed chain activities added including wall pushups; and supine chin tucks initiated to assist with  cervical/ postural stability. Pt observed with good tolerance and was provided with cues and demonstration for form. Pt will be progressed with therapy as tolerated to address goals, reduce pain and improve shoulder strength/ ROM. No adverse reactions or signs of distress observed during, and/or following tx. Continue with PT's POC and will continue to monitor pt's symptoms.          Timed:         Manual Therapy:    5     mins  83437;     Therapeutic Exercise:   36      mins  78726;     Neuromuscular Ruth:        mins  48578;    Therapeutic  Activity:     10     mins  20076;     Gait Training:           mins  54040;     Ultrasound:          mins  56019;    Ionto                                   mins   38790  Self Care                            mins   94267      Un-Timed:  Electrical Stimulation:         mins  70615 ( );  Dry Needling          mins self-pay  Traction          mins 33085  Canalith Repos         mins 02068    Timed Treatment:  51    mins   Total Treatment:     61   mins (CP: x 10 min)     Natalie Smith. Linda, [facility]  [place] License: [id number]

## 2023-08-28 ENCOUNTER — TREATMENT (OUTPATIENT)
Dept: PHYSICAL THERAPY | Facility: CLINIC | Age: 38
End: 2023-08-28
Payer: OTHER GOVERNMENT

## 2023-08-28 DIAGNOSIS — M25.611 DECREASED ROM OF RIGHT SHOULDER: Primary | ICD-10-CM

## 2023-08-28 DIAGNOSIS — M25.511 CHRONIC RIGHT SHOULDER PAIN: ICD-10-CM

## 2023-08-28 DIAGNOSIS — G89.29 CHRONIC RIGHT SHOULDER PAIN: ICD-10-CM

## 2023-08-28 DIAGNOSIS — Z98.890 S/P ARTHROSCOPY OF RIGHT SHOULDER: ICD-10-CM

## 2023-08-28 PROCEDURE — G0283 ELEC STIM OTHER THAN WOUND: HCPCS | Performed by: PHYSICAL THERAPIST

## 2023-08-28 PROCEDURE — 97530 THERAPEUTIC ACTIVITIES: CPT | Performed by: PHYSICAL THERAPIST

## 2023-08-28 PROCEDURE — 97110 THERAPEUTIC EXERCISES: CPT | Performed by: PHYSICAL THERAPIST

## 2023-08-28 NOTE — PROGRESS NOTES
Physical Therapy Re Certification Of Plan of Care  Patient: Teo Griffith   : 1985  Diagnosis/ICD-10 Code:  Decreased ROM of right shoulder [M25.611]  Referring practitioner: Gregorio Thomas DO  Date of Initial Visit: Type: THERAPY  Noted: 2023  Today's Date: 2023  Patient seen for 26 sessions         Visit Diagnoses:    ICD-10-CM ICD-9-CM   1. Decreased ROM of right shoulder  M25.611 719.51   2. Chronic right shoulder pain  M25.511 719.41    G89.29 338.29   3. S/P arthroscopy of right shoulder  Z98.890 V45.89         Teo Griffith reports:   Subjective Questionnaire: QuickDASH: 25  Clinical Progress: improved  Home Program Compliance: Yes  Treatment has included: therapeutic exercise, neuromuscular re-education, manual therapy, therapeutic activity, electrical stimulation, ultrasound, moist heat, and cryotherapy      Subjective Evaluation    History of Present Illness    Subjective comment: Pt reports sleeping wrong last night and has increased right shoulder pain near the AC region.Pain  Current pain ratin  At best pain ratin  At worst pain ratin  Location: right clavicle and AC region           Objective          Active Range of Motion     Right Shoulder   Flexion: 145 degrees   Abduction: 120 degrees     Passive Range of Motion     Right Shoulder   Flexion: 160 degrees   Abduction: 150 degrees   External rotation 45ø: 60 degrees   Internal rotation 45ø: 60 degrees     Strength/Myotome Testing     Right Shoulder     Planes of Motion   Flexion: 3   Abduction: 3+   External rotation at 0ø: 4-   Internal rotation at 0ø: 4     Right Wrist/Hand      (2nd hand position)     Trial 1: 75 lbs    Trial 2: 80 lbs    Trial 3: 80 lbs    Average: 78.33 lbs        Assessment & Plan       Assessment  Impairments: abnormal muscle firing, abnormal or restricted ROM, activity intolerance, impaired physical strength, lacks appropriate home exercise program, pain with function and safety issue    Functional limitations: carrying objects, lifting, sleeping, pulling, pushing, uncomfortable because of pain, stooping, reaching behind back and reaching overhead   Assessment details: Patient has been attending therapy for treatment s/p a right shoulder arthroscopy; he has attended therapy for a total of 26 sessions.  Patient has shown improvements in shoulder ROM.  Pt scored a 25% on his Quickdash.Pt improved noted to have increased pain today.  Pt cont to have difficulty with shoulder flexion and scaption.  Therapy will follow for improved functional mobility and decreased pain.  Prognosis: good    Goals  Plan Goals: STG 6 weeks  1 Pt will be instructed in a HEP.  Met  2 Pt will report a 50% decrease in radicular symptoms.  Ongoing, progressing-  3 Pt will demonstrate 120 degree of arom shoulder flexion.  Met  Pt will improve his Quick Dash to less than 40%.   Met    LTG 12 weeks  1 Pt will improve his Mod Oswestry to less than 20%.  Not met  2 Pt will report back pain no greater than 3/10 with moderate lifting.  Not met  3 Pt will demonstrate 4/5 right shoulder gross strength.  Not met  4 Pt will improve his Quick Dash score to less than 15%.  Not met  5 Pt will demonstrate 140 degrees of AROM flexion with no distress.  Met      Plan  Therapy options: will be seen for skilled therapy services  Planned modality interventions: cryotherapy, TENS, thermotherapy (hydrocollator packs), traction and ultrasound  Planned therapy interventions: abdominal trunk stabilization, ADL retraining, body mechanics training, flexibility, functional ROM exercises, home exercise program, IADL retraining, joint mobilization, manual therapy, neuromuscular re-education, postural training, soft tissue mobilization, strengthening, stretching and therapeutic activities  Frequency: 2x week  Duration in weeks: 4  Treatment plan discussed with: patient  Plan details: Re-evaluation   90544  Therapeutic exercise  38952  Therapeutic activity     08749  Neuromuscular re-education   99803  Manual therapy   85081  Gait training  65100    Unattended e-stim (Medicaid/Medicare)     Moist heat/cryotherapy 23850   Ultrasound   30615  Mechanical traction 33908           Recommendations: Continue as planned  Timeframe: 1 month  Prognosis to achieve goals: good      Timed:         Manual Therapy:         mins  86462;     Therapeutic Exercise:    31     mins  95766;     Neuromuscular Ruth:        mins  70571;    Therapeutic Activity:     11     mins  32484;     Gait Training:           mins  21767;     Ultrasound:          mins  24385;    Ionto                                   mins   09315  Self Care                            mins   22470    Un-Timed:  Electrical Stimulation:    10     mins  38898 ( );  Dry Needling          mins self-pay  Traction          mins 22483  Re-Eval                               mins  29767  Canalith Repos         mins 27738    Timed Treatment:   42   mins   Total Treatment:     52   mins          PT: Tim Jackson PT     [place] License:  [id number]    Electronically signed by Tim Jackson PT, 08/28/23, 9:40 AM EDT    Certification Period: 8/28/2023 thru 11/25/2023  I certify that the therapy services are furnished while this patient is under my care.  The services outlined above are required by this patient, and will be reviewed every 90 days.         Physician Signature:__________________________________________________    PHYSICIAN: Gregorio Thomas DO  NPI: [id number]                                      DATE:  :     Please sign and return via fax to [email or web address] . Thank you, [facility]

## 2023-09-05 ENCOUNTER — TREATMENT (OUTPATIENT)
Dept: PHYSICAL THERAPY | Facility: CLINIC | Age: 38
End: 2023-09-05
Payer: OTHER GOVERNMENT

## 2023-09-05 DIAGNOSIS — M25.611 DECREASED ROM OF RIGHT SHOULDER: Primary | ICD-10-CM

## 2023-09-05 DIAGNOSIS — M25.511 CHRONIC RIGHT SHOULDER PAIN: ICD-10-CM

## 2023-09-05 DIAGNOSIS — Z98.890 S/P ARTHROSCOPY OF RIGHT SHOULDER: ICD-10-CM

## 2023-09-05 DIAGNOSIS — G89.29 CHRONIC RIGHT SHOULDER PAIN: ICD-10-CM

## 2023-09-05 PROCEDURE — G0283 ELEC STIM OTHER THAN WOUND: HCPCS | Performed by: PHYSICAL THERAPIST

## 2023-09-05 PROCEDURE — 97530 THERAPEUTIC ACTIVITIES: CPT | Performed by: PHYSICAL THERAPIST

## 2023-09-05 PROCEDURE — 97110 THERAPEUTIC EXERCISES: CPT | Performed by: PHYSICAL THERAPIST

## 2023-09-05 NOTE — PROGRESS NOTES
Physical Therapy Daily Treatment Note      Patient: Teo Griffith   : 1985  Referring practitioner: Gregorio Thomas DO  Date of Initial Visit: Type: THERAPY  Noted: 2023  Today's Date: 2023  Patient seen for 27 sessions       Visit Diagnoses:    ICD-10-CM ICD-9-CM   1. Decreased ROM of right shoulder  M25.611 719.51   2. Chronic right shoulder pain  M25.511 719.41    G89.29 338.29   3. S/P arthroscopy of right shoulder  Z98.890 V45.89       Subjective Evaluation    History of Present Illness    Subjective comment: Patient reports that he had been unable to attend therapy, due to illness.  He states tht he has 3/10 pain today.Pain  Current pain rating: 3         Objective   See Exercise, Manual, and Modality Logs for complete treatment.       Assessment & Plan       Assessment  Assessment details: Therapy session initiated with there ex and therapeutic activities, followed by cryotherapy/ESTIM.  No adverse reactions were noted with modalities.  Exercises continued to focus on shoulder ROM, scapular stabilization, and UE strengthening.  Patient was provided with verbal cues during session to ensure proper form with exercises.  Patient reported muscle fatigue at conclusion of exercises, but denied any increase in pain.  He will continue to be progressed per his tolerance and POC.      Timed:         Manual Therapy:         mins  48039;     Therapeutic Exercise:    35     mins  76399;     Neuromuscular Ruth:        mins  45178;    Therapeutic Activity:     14     mins  63911;     Gait Training:           mins  59432;     Ultrasound:          mins  86934;    Ionto                                   mins   92402  Self Care                            mins   33439      Un-Timed:  Electrical Stimulation:     10    mins  91936 ( );  Dry Needling          mins self-pay  Traction          mins 58929  Canalith Repos         mins 78617    Timed Treatment:   49   mins   Total Treatment:     59    mins    Jacqui Desai, PT  [place] License: [id number]  Electronically signed by Jacqui Desai PT, 09/05/23, 1:30 PM EDT.

## 2023-09-11 ENCOUNTER — TREATMENT (OUTPATIENT)
Dept: PHYSICAL THERAPY | Facility: CLINIC | Age: 38
End: 2023-09-11
Payer: OTHER GOVERNMENT

## 2023-09-11 DIAGNOSIS — M53.86 DECREASED ROM OF LUMBAR SPINE: ICD-10-CM

## 2023-09-11 DIAGNOSIS — M54.41 CHRONIC BILATERAL LOW BACK PAIN WITH RIGHT-SIDED SCIATICA: Primary | ICD-10-CM

## 2023-09-11 DIAGNOSIS — G89.29 CHRONIC BILATERAL LOW BACK PAIN WITH RIGHT-SIDED SCIATICA: Primary | ICD-10-CM

## 2023-09-11 PROCEDURE — 97110 THERAPEUTIC EXERCISES: CPT | Performed by: PHYSICAL THERAPIST

## 2023-09-11 PROCEDURE — 97161 PT EVAL LOW COMPLEX 20 MIN: CPT | Performed by: PHYSICAL THERAPIST

## 2023-09-11 PROCEDURE — 97140 MANUAL THERAPY 1/> REGIONS: CPT | Performed by: PHYSICAL THERAPIST

## 2023-09-11 NOTE — PROGRESS NOTES
Physical Therapy Initial Evaluation and Plan of Care    Patient: Teo Griffith   : 1985  Diagnosis/ICD-10 Code:  Chronic bilateral low back pain with right-sided sciatica [M54.41, G89.29]  Referring practitioner: Gregorio Thomas DO  Date of Initial Visit: 2023  Today's Date: 2023  Patient seen for 1 session         Visit Diagnoses:    ICD-10-CM ICD-9-CM   1. Chronic bilateral low back pain with right-sided sciatica  M54.41 724.2    G89.29 724.3     338.29   2. Decreased ROM of lumbar spine  M53.86 724.9           Subjective Evaluation    History of Present Illness  Onset date: 2022.  Mechanism of injury: Patient reports that he injured his low back in 2022, while during a ACFT test.  Patient strained his back while performing a dead lift. The patient received an MRI of the lower back that demonstrated four disc bulges in the lumbar spine. Patient reports that he had been experiencing numbness/tingling in the right LE, but it subsided following receiving an epidural injection approximately 3-4 months ago; however, he has started to notice numbness/tingling in the right LE again that extends to the calf.  Patient denies any changes in bowel/bladder function.  Patient reports that he has noticed decreased mobility in the back, also noting pain with movement.  Patient reports difficulty with sitting for extended lengths of time, estimating a 1 hour sitting tolerance.      Pain  Current pain ratin  At best pain rating: 3  At worst pain ratin  Location: Lumbar  Quality: sharp and knife-like  Relieving factors: rest, change in position, ice and medications  Aggravating factors: repetitive movement, movement, standing, sleeping, prolonged positioning and lifting    Diagnostic Tests  MRI studies: abnormal (4 bulging discs)    Patient Goals  Patient goals for therapy: decreased pain and increased motion           Objective          Static Posture     Lumbar Spine   Decreased  lordosis.     Comments  Left lateral shift (hips to right, shoulders to the left)    Palpation   Left   Muscle spasm in the erector spinae, lumbar paraspinals and quadratus lumborum.   Tenderness of the gluteus stefano and gluteus medius.     Right   Muscle spasm in the erector spinae, lumbar paraspinals and quadratus lumborum. Tenderness of the gluteus stefano, gluteus medius and piriformis.     Tenderness     Lumbar Spine  Tenderness in the spinous process.     Right Hip   Tenderness in the PSIS.     Active Range of Motion     Lumbar   Flexion: Active lumbar flexion: 50%   Extension: Active lumbar extension: 25%   Left rotation: Active left lumbar rotation: 75%   Right rotation: Active right lumbar rotation: 50%     Strength/Myotome Testing     Left Hip   Planes of Motion   Flexion: 4+  Abduction: 4+  Adduction: 4    Right Hip   Planes of Motion   Flexion: 4  Abduction: 4  Adduction: 4-    Left Knee   Flexion: 4+  Extension: 4+    Right Knee   Flexion: 4  Extension: 4-    Tests       Thoracic   Positive slump.     Additional Tests Details  Repeated Flexion/Extension Test-centralization not reported with flexion or extension        Assessment & Plan       Assessment  Impairments: abnormal gait, abnormal muscle firing, abnormal or restricted ROM, activity intolerance, impaired physical strength, lacks appropriate home exercise program and pain with function   Functional limitations: lifting, sleeping, pulling, pushing, uncomfortable because of pain, moving in bed, sitting, standing, stooping and unable to perform repetitive tasks   Assessment details: Patient is a 38 year old male referred to outpatient physical therapy for low back pain. The patient presents with increased pain, decreased lumbar ROM, decreased LE strength, impaired gait, and impaired posture. Due to underlying impairments, patient has difficulty with performing household tasks, traveling in the car, and lifting.  Positive special tests included  Slump Test, which indicates adverse neural tension pathology.  Patient will benefit from skilled PT, so that patient can achieve maximum level of function.     Prognosis: good    Goals  Plan Goals: SHORT TERM GOALS:     4 weeks  1. Patient will be independent/compliant with HEP.  2. Patient will report pain no greater than 3/10 when performing self-care activities.  3. Patient will report pain no greater than 3/10 when sitting for 1 hour to travel to appointments.  4. Patient will be educated in proper lifting mechanics for improved back safety and be able to demonstrate on 4/5 trials.    LONG TERM GOALS:   8 weeks  1. Patient will show a 25% improvement of lumbar AROM to show improved ability to perform functional activities.  2. Bilateral LE strength will improve to at least 4+/5 to allow for greater ease with daily tasks.  3. Patient to report less than 30% impairment on the Modified Oswestry for improved functional mobility.  4. Pt to report 50% decrease of pain/numbness/tingling into the right leg to show decreased nerve compression.  5. Patient will be able to lift and carry 20# with proper body mechanics and no increase in back pain to allow for greater ease with daily tasks.      Plan  Therapy options: will be seen for skilled therapy services  Planned modality interventions: cryotherapy, TENS, electrical stimulation/Russian stimulation, thermotherapy (hydrocollator packs), ultrasound and traction  Planned therapy interventions: manual therapy, ADL retraining, balance/weight-bearing training, neuromuscular re-education, body mechanics training, postural training, soft tissue mobilization, flexibility, spinal/joint mobilization, functional ROM exercises, strengthening, gait training, stretching, home exercise program, therapeutic activities, IADL retraining, wheelchair management/propulsion training and joint mobilization  Frequency: 2x week  Duration in weeks: 8  Treatment plan discussed with: patient  Plan  details: Moderate Evaluation  65391  Re-evaluation   89673    Therapeutic exercise  24425  Therapeutic activity    46033  Neuromuscular re-education   84524  Manual therapy   16374  Gait training  76843    Unattended e-stim (Private)  32618  Unattended e-stim (Medicaid/Medicare)     Moist heat/cryotherapy 26348   Ultrasound   61658  Mechanical traction 71041      History # of Personal Factors and/or Comorbidities: MODERATE (1-2)  Examination of Body System(s): # of elements: MODERATE (3)  Clinical Presentation: STABLE   Clinical Decision Making: MODERATE      Timed:         Manual Therapy:    15     mins  03949;     Therapeutic Exercise:    12     mins  94761;     Neuromuscular Ruth:        mins  10049;    Therapeutic Activity:          mins  25577;     Gait Training:           mins  33674;     Ultrasound:          mins  49738;    Ionto                                   mins   34596  Self Care                            mins   07136      Un-Timed:  Electrical Stimulation:         mins  00547 ( );  Dry Needling          mins self-pay  Traction          mins 77002  Low Eval          Mins  88162  Mod Eval     30     Mins  77417  High Eval                            Mins  01209  Canalith Repos         mins 53148      Timed Treatment:   27   mins   Total Treatment:     57   mins          PT: Jacqui Desai, PT     License Number: [id number]  Electronically signed by Jacqui Desai PT, 09/11/23, 11:06 AM EDT    Certification Period: 9/11/2023 thru 12/9/2023  I certify that the therapy services are furnished while this patient is under my care.  The services outlined above are required by this patient, and will be reviewed every 90 days.         Physician Signature:__________________________________________________    PHYSICIAN: Gregorio Thomas DO  NPI: [id number]                                      DATE:      Please sign and return via fax to [email or web address] . Thank you, [facility].

## 2023-09-13 ENCOUNTER — TREATMENT (OUTPATIENT)
Dept: PHYSICAL THERAPY | Facility: CLINIC | Age: 38
End: 2023-09-13
Payer: OTHER GOVERNMENT

## 2023-09-13 DIAGNOSIS — Z98.890 S/P ARTHROSCOPY OF RIGHT SHOULDER: ICD-10-CM

## 2023-09-13 DIAGNOSIS — M53.86 DECREASED ROM OF LUMBAR SPINE: ICD-10-CM

## 2023-09-13 DIAGNOSIS — G89.29 CHRONIC RIGHT SHOULDER PAIN: Primary | ICD-10-CM

## 2023-09-13 DIAGNOSIS — M54.41 CHRONIC BILATERAL LOW BACK PAIN WITH RIGHT-SIDED SCIATICA: Primary | ICD-10-CM

## 2023-09-13 DIAGNOSIS — M25.511 CHRONIC RIGHT SHOULDER PAIN: Primary | ICD-10-CM

## 2023-09-13 DIAGNOSIS — G89.29 CHRONIC BILATERAL LOW BACK PAIN WITH RIGHT-SIDED SCIATICA: Primary | ICD-10-CM

## 2023-09-13 PROCEDURE — 97140 MANUAL THERAPY 1/> REGIONS: CPT | Performed by: PHYSICAL THERAPIST

## 2023-09-13 PROCEDURE — 97110 THERAPEUTIC EXERCISES: CPT | Performed by: PHYSICAL THERAPIST

## 2023-09-13 NOTE — PROGRESS NOTES
Physical Therapy Daily Treatment Note      Patient: Teo Griffith   : 1985  Referring practitioner: Gregorio Thomas DO  Date of Initial Visit: Type: THERAPY  Noted: 2023  Today's Date: 2023  Patient seen for 28 sessions       Visit Diagnoses:    ICD-10-CM ICD-9-CM   1. Chronic right shoulder pain  M25.511 719.41    G89.29 338.29   2. S/P arthroscopy of right shoulder  Z98.890 V45.89       Subjective Evaluation    History of Present Illness    Subjective comment: Patient reports 3/10 pain today for the right shoulder.Pain  Current pain rating: 3         Objective   See Exercise, Manual, and Modality Logs for complete treatment.       Assessment & Plan       Assessment  Assessment details: Therapy session consisted of there ex, therapeutic activities, cryotherapy, and ESTIM.  No adverse reactions were noted with modalities.  Patient progressed to include increased weight for standing shoulder flexion.  Patient displays good form and posture, while performing shoulder exercises.  Patient reported no change in pain at conclusion of session.  He will continue to be progressed per his tolerance and POC.        Timed:         Manual Therapy:         mins  65312;     Therapeutic Exercise:    15     mins  97170;     Neuromuscular Ruth:        mins  71917;    Therapeutic Activity:     11     mins  35658;     Gait Training:           mins  35571;     Ultrasound:          mins  30635;    Ionto                                   mins   45154  Self Care                            mins   61826      Un-Timed:  Electrical Stimulation:    10     mins  21251 ( );  Dry Needling          mins self-pay  Traction          mins 46916  Canalith Repos         mins 05545    Timed Treatment:   26   mins   Total Treatment:     36   mins    Jacqui Desai PT  [place] License: [id number]  Electronically signed by Jacqui Desai PT, 23, 1:53 PM EDT.

## 2023-09-13 NOTE — PROGRESS NOTES
Physical Therapy Daily Treatment Note      Patient: Teo Griffith   : 1985  Referring practitioner: Alexis Sanchez MD  Date of Initial Visit: Type: THERAPY  Noted: 2023  Today's Date: 2023  Patient seen for 2 sessions       Visit Diagnoses:    ICD-10-CM ICD-9-CM   1. Chronic bilateral low back pain with right-sided sciatica  M54.41 724.2    G89.29 724.3     338.29   2. Decreased ROM of lumbar spine  M53.86 724.9       Subjective Evaluation    History of Present Illness    Subjective comment: Patient reports 3/10 pain in the low back.Pain  Current pain rating: 3         Objective   See Exercise, Manual, and Modality Logs for complete treatment.       Assessment & Plan       Assessment  Assessment details: Treatment session initiated with manual therapy to the lumbar region.  Muscle guarding was noted at bilateral lumbar paraspinals during STM.  Following STM, patient performed there ex that addressed lumbar ROM, core strengthening, and lumbar extension preference.  Patient educated to perform exercises per his tolerance, with patient verbalizing understanding.  Treatment concluded with cryotherapy, with no skin irritation following.  He noted no change in pain at conclusion of session.  He will continue to be progressed per his tolerance and POC.        Timed:         Manual Therapy:    13     mins  89739;     Therapeutic Exercise:    20     mins  88163;     Neuromuscular Ruth:        mins  13304;    Therapeutic Activity:          mins  05223;     Gait Training:           mins  82677;     Ultrasound:          mins  16423;    Ionto                                   mins   60086  Self Care                            mins   31176      Un-Timed:  Electrical Stimulation:         mins  39127 ( );  Dry Needling          mins self-pay  Traction          mins 79037  Canalith Repos         mins 35796  Ice-10 min    Timed Treatment:   33   mins   Total Treatment:     43   mins    Jacqui JAIN  AXEL Desai  [place] License: [id number]  Electronically signed by Jacqui Desai PT, 09/13/23, 1:23 PM EDT.

## 2023-09-18 ENCOUNTER — TREATMENT (OUTPATIENT)
Dept: PHYSICAL THERAPY | Facility: CLINIC | Age: 38
End: 2023-09-18
Payer: OTHER GOVERNMENT

## 2023-09-18 DIAGNOSIS — M25.611 DECREASED ROM OF RIGHT SHOULDER: ICD-10-CM

## 2023-09-18 DIAGNOSIS — Z98.890 S/P ARTHROSCOPY OF RIGHT SHOULDER: ICD-10-CM

## 2023-09-18 DIAGNOSIS — M54.41 CHRONIC BILATERAL LOW BACK PAIN WITH RIGHT-SIDED SCIATICA: Primary | ICD-10-CM

## 2023-09-18 DIAGNOSIS — G89.29 CHRONIC RIGHT SHOULDER PAIN: Primary | ICD-10-CM

## 2023-09-18 DIAGNOSIS — G89.29 CHRONIC BILATERAL LOW BACK PAIN WITH RIGHT-SIDED SCIATICA: Primary | ICD-10-CM

## 2023-09-18 DIAGNOSIS — M53.86 DECREASED ROM OF LUMBAR SPINE: ICD-10-CM

## 2023-09-18 DIAGNOSIS — M25.511 CHRONIC RIGHT SHOULDER PAIN: Primary | ICD-10-CM

## 2023-09-18 PROCEDURE — 97110 THERAPEUTIC EXERCISES: CPT | Performed by: PHYSICAL THERAPIST

## 2023-09-18 PROCEDURE — 97140 MANUAL THERAPY 1/> REGIONS: CPT | Performed by: PHYSICAL THERAPIST

## 2023-09-18 NOTE — PROGRESS NOTES
Physical Therapy Daily Treatment Note      Patient: Teo Griffith   : 1985  Referring practitioner: Gregorio Thomas DO  Date of Initial Visit: Type: THERAPY  Noted: 2023  Today's Date: 2023  Patient seen for 29 sessions       Visit Diagnoses:    ICD-10-CM ICD-9-CM   1. Chronic right shoulder pain  M25.511 719.41    G89.29 338.29   2. S/P arthroscopy of right shoulder  Z98.890 V45.89   3. Decreased ROM of right shoulder  M25.611 719.51       Subjective Evaluation    History of Present Illness    Subjective comment: Patient reports 4/10 pain today for the shouder.  He notes that he is unsure if the soreness is due to the weather.  Patient also notes that he has been noticing tingling in the 3rd-5th digits, which started today.Pain  Current pain ratin         Objective   See Exercise, Manual, and Modality Logs for complete treatment.       Assessment & Plan       Assessment  Assessment details: Therapy session consisted of there ex, therapeutic activities, PROM, ice, and ESTIM.  No adverse reactions were noted with modalities.  Nerve glides added at today's session, due to reports of tingling in right UE; patient reported mild discomfort and stretching with nerve glides.  Patient requested to hold on performing lat pulls at weight tower, due to increased pain.  Patient reported a decrease in pain to 4/10 post-tx.  He will continue to be progressed per his tolerance and POC.        Timed:         Manual Therapy:         mins  61002;     Therapeutic Exercise:    28     mins  78099;     Neuromuscular Ruth:        mins  34620;    Therapeutic Activity:     13     mins  42512;     Gait Training:           mins  70974;     Ultrasound:          mins  05488;    Ionto                                   mins   61664  Self Care                            mins   20349      Un-Timed:  Electrical Stimulation:    10     mins  67893 ( );  Dry Needling          mins self-pay  Traction          mins  10673  [facility] [address]    Timed Treatment:   41   mins   Total Treatment:     51   mins    Jacqui Desai, PT  [place] License: [id number]  Electronically signed by Jacqui Desai PT, 09/18/23, 1:27 PM EDT.

## 2023-09-18 NOTE — PROGRESS NOTES
Physical Therapy Daily Treatment Note      Patient: Teo Griffith   : 1985  Referring practitioner: Alexis Sanchez MD  Date of Initial Visit: Type: THERAPY  Noted: 2023  Today's Date: 2023  Patient seen for 3 sessions       Visit Diagnoses:    ICD-10-CM ICD-9-CM   1. Chronic bilateral low back pain with right-sided sciatica  M54.41 724.2    G89.29 724.3     338.29   2. Decreased ROM of lumbar spine  M53.86 724.9       Subjective Evaluation    History of Present Illness    Subjective comment: Patient reports 3/10 pain in the back today.  He notes that he is getting an epidural injection on Wednesday.Pain  Current pain rating: 3         Objective   See Exercise, Manual, and Modality Logs for complete treatment.       Assessment & Plan       Assessment  Assessment details: Therapy session initiated with manual therapy to the lumbar region.  Muscle guarding was noted at bilateral lumbar paraspinals, with greater tightness on the right.  There ex progressed to include increased repetitions and the addition of new exercises to address LE strengthening.  Treatment concluded with cryotherapy.  He reported 3/10 pain pre- and post-tx.  Patient will continue to be progressed per his tolerance and POC.      Timed:         Manual Therapy:    12     mins  42297;     Therapeutic Exercise:    27     mins  45863;     Neuromuscular Ruth:        mins  33424;    Therapeutic Activity:          mins  68778;     Gait Training:           mins  05642;     Ultrasound:          mins  93500;    Ionto                                   mins   75786  Self Care                            mins   39279      Un-Timed:  Electrical Stimulation:         mins  39075 ( );  Dry Needling          mins self-pay  Traction          mins 58132  Canalith Repos         mins 93464  Ice-10 min    Timed Treatment:   39   mins   Total Treatment:     49   mins    Jacqui Desai, PT  [place] License: [id number]  Electronically signed by  Jacqui Desai, PT, 09/18/23, 10:51 AM EDT.

## 2023-09-25 ENCOUNTER — TREATMENT (OUTPATIENT)
Dept: PHYSICAL THERAPY | Facility: CLINIC | Age: 38
End: 2023-09-25

## 2023-09-25 DIAGNOSIS — G89.29 CHRONIC BILATERAL LOW BACK PAIN WITH RIGHT-SIDED SCIATICA: Primary | ICD-10-CM

## 2023-09-25 DIAGNOSIS — M25.511 CHRONIC RIGHT SHOULDER PAIN: Primary | ICD-10-CM

## 2023-09-25 DIAGNOSIS — M53.86 DECREASED ROM OF LUMBAR SPINE: ICD-10-CM

## 2023-09-25 DIAGNOSIS — G89.29 CHRONIC RIGHT SHOULDER PAIN: Primary | ICD-10-CM

## 2023-09-25 DIAGNOSIS — M54.41 CHRONIC BILATERAL LOW BACK PAIN WITH RIGHT-SIDED SCIATICA: Primary | ICD-10-CM

## 2023-09-25 DIAGNOSIS — M25.611 DECREASED ROM OF RIGHT SHOULDER: ICD-10-CM

## 2023-09-25 DIAGNOSIS — Z98.890 S/P ARTHROSCOPY OF RIGHT SHOULDER: ICD-10-CM

## 2023-09-25 PROCEDURE — 97110 THERAPEUTIC EXERCISES: CPT | Performed by: PHYSICAL THERAPIST

## 2023-09-25 PROCEDURE — 97140 MANUAL THERAPY 1/> REGIONS: CPT | Performed by: PHYSICAL THERAPIST

## 2023-09-25 NOTE — PROGRESS NOTES
Physical Therapy Daily Treatment Note      Patient: Teo Griffith   : 1985  Referring practitioner: Alexis Sanchez MD  Date of Initial Visit: Type: THERAPY  Noted: 2023  Today's Date: 2023  Patient seen for 4 sessions       Visit Diagnoses:    ICD-10-CM ICD-9-CM   1. Chronic bilateral low back pain with right-sided sciatica  M54.41 724.2    G89.29 724.3     338.29   2. Decreased ROM of lumbar spine  M53.86 724.9       Subjective: Patient reports 2/10 low back pain upon arrival to therapy.      Objective   See Exercise, Manual, and Modality Logs for complete treatment.       Assessment/Plan: Today's therapy session initiated with manual therapy including STM to lumbar region f/b therex per flow sheet and cryotherapy at conclusion. Therex performed to assist with reduced radicular symptoms, improved postural strength and stability. Exercise progressed with bridge advanced to bridge on physioball and supine clams to side lying. Pt observed with good tolerance and was provided with cues and demonstration as needed. Pt noted with tightness bilaterally along paraspinals during manual therapy. Pt continues to benefit from therapy services and will be progressed as tolerated to address goals, reduce pain and improve function. No skin irritation observed following. Continue with PT's POC.       Timed:         Manual Therapy:    14     mins  38593;     Therapeutic Exercise:    26    mins  33267;     Neuromuscular Ruth:        mins  26895;    Therapeutic Activity:          mins  07284;     Gait Training:           mins  39611;     Ultrasound:          mins  58405;    Ionto                                   mins   65182  Self Care                            mins   82388      Un-Timed:  Electrical Stimulation:         mins  25006 ( );  Dry Needling          mins self-pay  Traction          mins 79636  Canalith Repos         mins 02561    Timed Treatment:   40   mins   Total Treatment:    50    mins  (CP: x 10 min)     Natalie Smith. Linda, PTA  [place] License: [id number]

## 2023-09-25 NOTE — PROGRESS NOTES
Physical Therapy Daily Treatment Note      Patient: Teo Griffith   : 1985  Referring practitioner: Gregorio Thomas DO  Date of Initial Visit: Type: THERAPY  Noted: 2023  Today's Date: 2023  Patient seen for 30 sessions       Visit Diagnoses:    ICD-10-CM ICD-9-CM   1. Chronic right shoulder pain  M25.511 719.41    G89.29 338.29   2. S/P arthroscopy of right shoulder  Z98.890 V45.89   3. Decreased ROM of right shoulder  M25.611 719.51       Subjective: Patient reports his shoulder is sore this morning w/ 3/10 pain prior to tx. Pt states the area on the front of his shoulder has scabbed over again. Pt plans to continue to monitor and contact ortho office if needed.     Objective   See Exercise, Manual, and Modality Logs for complete treatment.       Assessment/Plan: Patient completed today's session with no changes in shoulder pain following, 3/10. Treatment performed including therex and therapeutic activities per flow sheet to assist with improved ROM, scapular stability and UE strength f/b shoulder PROM and modalities at conclusion. Pt provided with cues and demonstration as needed during exercise for mechanics and for max benefit. Pt educated to continue to monitor area on front of shoulder and contact PCP if continues to be bothersome. Pt verbalized understanding. Pt continues to benefit from therapy services and will be progressed as tolerated to address goals, reduce pain and improve ability to perform functional task. No adverse reactions observed during, and/ or following tx. Continue with PT's POC.       Timed:         Manual Therapy:         mins  44604;     Therapeutic Exercise:   30    mins  34587;     Neuromuscular Ruth:        mins  54338;    Therapeutic Activity:     10     mins  21003;     Gait Training:           mins  62745;     Ultrasound:          mins  83652;    Ionto                                   mins   04605  Self Care                            mins    25222      Un-Timed:  Electrical Stimulation:    10     mins  71961 ( );  Dry Needling          mins self-pay  Traction          mins 21457  Canalith Repos         mins 77152    Timed Treatment:   40   mins   Total Treatment:    50    mins    Natalie Smith. Linda, ELHAM  [place] License: [id number]

## 2023-09-27 ENCOUNTER — TREATMENT (OUTPATIENT)
Dept: PHYSICAL THERAPY | Facility: CLINIC | Age: 38
End: 2023-09-27
Payer: OTHER GOVERNMENT

## 2023-09-27 DIAGNOSIS — M25.511 CHRONIC RIGHT SHOULDER PAIN: Primary | ICD-10-CM

## 2023-09-27 DIAGNOSIS — M53.86 DECREASED ROM OF LUMBAR SPINE: ICD-10-CM

## 2023-09-27 DIAGNOSIS — G89.29 CHRONIC RIGHT SHOULDER PAIN: Primary | ICD-10-CM

## 2023-09-27 DIAGNOSIS — M25.611 DECREASED ROM OF RIGHT SHOULDER: ICD-10-CM

## 2023-09-27 DIAGNOSIS — Z98.890 S/P ARTHROSCOPY OF RIGHT SHOULDER: ICD-10-CM

## 2023-09-27 DIAGNOSIS — M54.41 CHRONIC BILATERAL LOW BACK PAIN WITH RIGHT-SIDED SCIATICA: Primary | ICD-10-CM

## 2023-09-27 DIAGNOSIS — G89.29 CHRONIC BILATERAL LOW BACK PAIN WITH RIGHT-SIDED SCIATICA: Primary | ICD-10-CM

## 2023-09-27 PROCEDURE — 97110 THERAPEUTIC EXERCISES: CPT | Performed by: PHYSICAL THERAPIST

## 2023-09-27 PROCEDURE — 97140 MANUAL THERAPY 1/> REGIONS: CPT | Performed by: PHYSICAL THERAPIST

## 2023-09-27 NOTE — PROGRESS NOTES
Physical Therapy Daily Treatment Note      Patient: Teo Griffith   : 1985  Referring practitioner: Alexis Sanchez MD  Date of Initial Visit: Type: THERAPY  Noted: 2023  Today's Date: 2023  Patient seen for 5 sessions       Visit Diagnoses:    ICD-10-CM ICD-9-CM   1. Chronic bilateral low back pain with right-sided sciatica  M54.41 724.2    G89.29 724.3     338.29   2. Decreased ROM of lumbar spine  M53.86 724.9       Subjective: Patient reports 2/10 pain in the back upon arrival to therapy. Pt request to abbreviate today's session due to having a conflicting appointment following.     Objective   See Exercise, Manual, and Modality Logs for complete treatment.       Assessment/Plan: Today's therapy session consisted of manual therapy and therex per flow sheet for lumbar region. Modalities held at conclusion secondary to tx being abbreviated. Exercise progressed with resistance of red theraband added to sidelying clams. Pt observed with good tolerance and was provided with cues for form. Muscle guarding noted along bilateral paraspinals during manual therapy with improvement following. Pt will be progressed with therapy as tolerated to address goals, reduce pain and progress extension activities. No adverse reactions observed during, and/ or following tx. Continue with PT's POC.      Timed:         Manual Therapy:    14     mins  06494;     Therapeutic Exercise:   16      mins  88640;     Neuromuscular Ruth:        mins  84604;    Therapeutic Activity:          mins  85587;     Gait Training:           mins  28189;     Ultrasound:          mins  52817;    Ionto                                   mins   19335  Self Care                            mins   68315      Un-Timed:  Electrical Stimulation:         mins  29530 ( );  Dry Needling          mins self-pay  Traction          mins 00464  Canalith Repos         mins 57156    Timed Treatment:   30   mins   Total Treatment:    30     jalil Smith. Linda, PTA  [place] License: [id number]

## 2023-09-27 NOTE — PROGRESS NOTES
Physical Therapy Daily Treatment Note      Patient: Teo Griffith   : 1985  Referring practitioner: Gregorio Thomas DO  Date of Initial Visit: Type: THERAPY  Noted: 2023  Today's Date: 2023  Patient seen for 31 sessions       Visit Diagnoses:    ICD-10-CM ICD-9-CM   1. Chronic right shoulder pain  M25.511 719.41    G89.29 338.29   2. S/P arthroscopy of right shoulder  Z98.890 V45.89   3. Decreased ROM of right shoulder  M25.611 719.51       Subjective: Patient reports 3/10 right shoulder pain prior to tx. Pt states he continues to have difficulty with overhead activities and reaching in behind the back. Pt request to abbreviate session due to conflicting appointment following.     Objective   See Exercise, Manual, and Modality Logs for complete treatment.       Assessment/Plan: Patient completed today's session with no changes in shoulder pain following. Pt continues to perform program with focus on improved shoulder strength, stability and to assist with ease of difficulty doing functional tasks. Activities progressed with additional strengthening exercise added, per protocol, for biceps and triceps. Pt observed with good tolerance. Pt required cues during exercise for improved posture and form. Modalities held at conclusion due to session being abbreviated per patient request. Pt educated to ice at home as needed. Pt will be progressed with therapy as tolerated to address goals, reduce pain and improve strength. No adverse reactions observed during, and/ or following tx. Continue with PT's POC.       Timed:         Manual Therapy:         mins  54564;     Therapeutic Exercise:    20     mins  04466;     Neuromuscular Ruth:        mins  80219;    Therapeutic Activity:      10    mins  52418;     Gait Training:           mins  59946;     Ultrasound:          mins  28588;    Ionto                                   mins   59315  Self Care                            mins    69271      Un-Timed:  Electrical Stimulation:         mins  24155 ( );  Dry Needling          mins self-pay  Traction          mins 97444  Canalith Repos         mins 81856    Timed Treatment:   30   mins   Total Treatment:     30   mins    Natalie Smith. Linda, PTA  [place] License: [id number]

## 2023-10-02 ENCOUNTER — TREATMENT (OUTPATIENT)
Dept: PHYSICAL THERAPY | Facility: CLINIC | Age: 38
End: 2023-10-02
Payer: OTHER GOVERNMENT

## 2023-10-02 DIAGNOSIS — G89.29 CHRONIC RIGHT SHOULDER PAIN: Primary | ICD-10-CM

## 2023-10-02 DIAGNOSIS — M53.86 DECREASED ROM OF LUMBAR SPINE: ICD-10-CM

## 2023-10-02 DIAGNOSIS — M25.511 CHRONIC RIGHT SHOULDER PAIN: Primary | ICD-10-CM

## 2023-10-02 DIAGNOSIS — M54.41 CHRONIC BILATERAL LOW BACK PAIN WITH RIGHT-SIDED SCIATICA: Primary | ICD-10-CM

## 2023-10-02 DIAGNOSIS — G89.29 CHRONIC BILATERAL LOW BACK PAIN WITH RIGHT-SIDED SCIATICA: Primary | ICD-10-CM

## 2023-10-02 DIAGNOSIS — M25.611 DECREASED ROM OF RIGHT SHOULDER: ICD-10-CM

## 2023-10-02 DIAGNOSIS — Z98.890 S/P ARTHROSCOPY OF RIGHT SHOULDER: ICD-10-CM

## 2023-10-02 PROCEDURE — G0283 ELEC STIM OTHER THAN WOUND: HCPCS | Performed by: PHYSICAL THERAPIST

## 2023-10-02 PROCEDURE — 97110 THERAPEUTIC EXERCISES: CPT | Performed by: PHYSICAL THERAPIST

## 2023-10-02 PROCEDURE — 97530 THERAPEUTIC ACTIVITIES: CPT | Performed by: PHYSICAL THERAPIST

## 2023-10-02 PROCEDURE — 97140 MANUAL THERAPY 1/> REGIONS: CPT | Performed by: PHYSICAL THERAPIST

## 2023-10-02 NOTE — PROGRESS NOTES
Physical Therapy Daily Treatment Note      Patient: Teo Griffith   : 1985  Referring practitioner: Alexis Sanchez MD  Date of Initial Visit: Type: THERAPY  Noted: 2023  Today's Date: 10/2/2023  Patient seen for 6 sessions       Visit Diagnoses:    ICD-10-CM ICD-9-CM   1. Chronic bilateral low back pain with right-sided sciatica  M54.41 724.2    G89.29 724.3     338.29   2. Decreased ROM of lumbar spine  M53.86 724.9       Subjective: Patient reports 2/10 low back pain upon arrival to therapy. Pt states he feels the epidural he received a few weeks ago has helped with the right leg pain.     Objective   See Exercise, Manual, and Modality Logs for complete treatment.       Assessment/Plan: Patient demonstrated good effort during today's session and had no complaints of increase in back pain following, 2/10. Treatment initiated with manual therapy including STM to lumbar region f/b therex as listed and modalities at conclusion. Additional strengthening activities added including standing hip extension with resistance band and squats on TG. Pt observed with good tolerance and was provided with cues for form. Pt continues to perform program to assist with reduced back pain, address lateral shift, and reduce radicular symptoms. Pt will be progressed with therapy as tolerated. No adverse reactions observed during, and/ or following tx. Continue with PT's.       Timed:         Manual Therapy:    11     mins  12956;     Therapeutic Exercise:     33    mins  06136;     Neuromuscular Ruth:        mins  08748;    Therapeutic Activity:          mins  70779;     Gait Training:           mins  97774;     Ultrasound:          mins  83867;    Ionto                                   mins   33403  Self Care                            mins   98358      Un-Timed:  Electrical Stimulation:         mins  42740 ( );  Dry Needling          mins self-pay  Traction          mins 87439  Canalith Repos         mins  50494    Timed Treatment:  44    mins   Total Treatment:    54   mins (CP:x  10 min)     Natalie Smith. Linda, ELHAM  [place] License: [id number]

## 2023-10-02 NOTE — PROGRESS NOTES
Physical Therapy Daily Treatment Note      Patient: Teo Griffith   : 1985  Referring practitioner: Gregorio Thomas DO  Date of Initial Visit: Type: THERAPY  Noted: 2023  Today's Date: 10/2/2023  Patient seen for 32 sessions       Visit Diagnoses:    ICD-10-CM ICD-9-CM   1. Chronic right shoulder pain  M25.511 719.41    G89.29 338.29   2. S/P arthroscopy of right shoulder  Z98.890 V45.89   3. Decreased ROM of right shoulder  M25.611 719.51       Subjective: Patient reports 3/10 right shoulder pain upon arrival to therapy. Pt states over the weekend he experienced numbness in his right hand, notably in the fourth and fifth digits, intermittently. Pt notes at the time he had his arm propped up on the couch arm. Pt also reports of stiffness in the shoulder of the morning when he wakes up.     Objective   See Exercise, Manual, and Modality Logs for complete treatment.       Assessment/Plan: Supervising therapist, Jacqui Desai, PT notified and aware of pt's subjective reports. PT advised on initiating ulnar nerve glides to assist with symptoms. Today's treatment consisted of therex and therapeutic activities per flow sheet f/b shoulder PROM and cryotherapy with Estim at conclusion. Exercise performed with continued focus on improved shoulder strength, improved scapular stability and ROM. Activities progressed with repetitions increased as tolerated. Ulnar nerve glides initiated with no complaints observed. Pt reported slight increase in soreness following, 4/10. However, no signs of distress. Pt will be progressed with therapy as tolerated to address goals, reduce pain and improve strength. Continue with PT's POC.       Timed:         Manual Therapy:         mins  87876;     Therapeutic Exercise:    34     mins  23312;     Neuromuscular Ruth:        mins  10827;    Therapeutic Activity:    10      mins  56652;     Gait Training:           mins  83841;     Ultrasound:          mins  70085;    Ionto                                    mins   69549  Self Care                            mins   11736      Un-Timed:  Electrical Stimulation:   10      mins  72250 ( );  Dry Needling          mins self-pay  Traction          mins 53317  Canalith Repos         mins 73563    Timed Treatment:   44   mins   Total Treatment:     54   mins    Natalie Smith. ELHAM Mckeon  [place] License: [id number]

## 2023-10-04 ENCOUNTER — TREATMENT (OUTPATIENT)
Dept: PHYSICAL THERAPY | Facility: CLINIC | Age: 38
End: 2023-10-04
Payer: OTHER GOVERNMENT

## 2023-10-04 DIAGNOSIS — G89.29 CHRONIC RIGHT SHOULDER PAIN: Primary | ICD-10-CM

## 2023-10-04 DIAGNOSIS — Z98.890 S/P ARTHROSCOPY OF RIGHT SHOULDER: ICD-10-CM

## 2023-10-04 DIAGNOSIS — M25.511 CHRONIC RIGHT SHOULDER PAIN: Primary | ICD-10-CM

## 2023-10-04 DIAGNOSIS — M25.611 DECREASED ROM OF RIGHT SHOULDER: ICD-10-CM

## 2023-10-04 DIAGNOSIS — M54.41 CHRONIC BILATERAL LOW BACK PAIN WITH RIGHT-SIDED SCIATICA: Primary | ICD-10-CM

## 2023-10-04 DIAGNOSIS — G89.29 CHRONIC BILATERAL LOW BACK PAIN WITH RIGHT-SIDED SCIATICA: Primary | ICD-10-CM

## 2023-10-04 DIAGNOSIS — M53.86 DECREASED ROM OF LUMBAR SPINE: ICD-10-CM

## 2023-10-04 PROCEDURE — G0283 ELEC STIM OTHER THAN WOUND: HCPCS | Performed by: PHYSICAL THERAPIST

## 2023-10-04 PROCEDURE — 97530 THERAPEUTIC ACTIVITIES: CPT | Performed by: PHYSICAL THERAPIST

## 2023-10-04 PROCEDURE — 97110 THERAPEUTIC EXERCISES: CPT | Performed by: PHYSICAL THERAPIST

## 2023-10-04 NOTE — PROGRESS NOTES
Physical Therapy Re Certification Of Plan of Care  Patient: Teo Griffith   : 1985  Diagnosis/ICD-10 Code:  Chronic bilateral low back pain with right-sided sciatica [M54.41, G89.29]  Referring practitioner: Alexis Sanchez MD  Date of Initial Visit: Type: THERAPY  Noted: 2023  Today's Date: 10/4/2023  Patient seen for 7 sessions         Visit Diagnoses:    ICD-10-CM ICD-9-CM   1. Chronic bilateral low back pain with right-sided sciatica  M54.41 724.2    G89.29 724.3     338.29   2. Decreased ROM of lumbar spine  M53.86 724.9       Subjective Questionnaire: Oswestry: 26/50=52% impaired  Clinical Progress: improved  Home Program Compliance: Yes      Subjective Evaluation    History of Present Illness    Subjective comment: Patient reports that he has noticed relief since receiving the epidural injection.  He notes that pain has centralized more towards the back.  He states that he saw neurosurgeon yesterday and was instructed to continue with PT; he notes that he also will get another epidural in the future.Pain  Current pain ratin  At best pain ratin  At worst pain ratin           Objective          Palpation     Right Tenderness of the piriformis.     Active Range of Motion     Lumbar   Flexion: Active lumbar flexion: 50%   Extension: Active lumbar extension: 25%   Left rotation: Active left lumbar rotation: 75%   Right rotation: Active right lumbar rotation: 75%     Strength/Myotome Testing     Left Hip   Planes of Motion   Flexion: 4+  Abduction: 4+  Adduction: 4    Right Hip   Planes of Motion   Flexion: 4  Abduction: 4+  Adduction: 4    Left Knee   Flexion: 4+  Extension: 4+    Right Knee   Flexion: 4+  Extension: 4        Assessment & Plan       Assessment  Impairments: abnormal gait, abnormal muscle firing, abnormal or restricted ROM, activity intolerance, impaired physical strength, lacks appropriate home exercise program and pain with function   Functional limitations:  lifting, sleeping, pulling, pushing, uncomfortable because of pain, moving in bed, sitting, standing, stooping and unable to perform repetitive tasks   Assessment details: Patient has been attending outpatient physical therapy for low back pain; he has attended therapy for a total of 7 sessions.  Patient has shown improvements in right lumbar rotation and LE strength.  Weakness continues to be noted in the right LE with MMT.  He currently reports a 52% functional mobility impairment, based on his response to the Modified Oswestry.  Patient will continue to benefit from skilled PT so that he can achieve his maximum level of function.  Prognosis: good    Goals  Plan Goals: SHORT TERM GOALS:     4 weeks  1. Patient will be independent/compliant with HEP.  Met  2. Patient will report pain no greater than 3/10 when performing self-care activities.  Ongoing, progressing-up to 4/10  3. Patient will report pain no greater than 3/10 when sitting for 1 hour to travel to appointments.  Ongoing, progressing-up to 4/10  4. Patient will be educated in proper lifting mechanics for improved back safety and be able to demonstrate on 4/5 trials.  Ongoing, progressing    LONG TERM GOALS:   8 weeks  1. Patient will show a 25% improvement of lumbar AROM to show improved ability to perform functional activities.  Ongoing, progressing  2. Bilateral LE strength will improve to at least 4+/5 to allow for greater ease with daily tasks.  Ongoing, progressing  3. Patient to report less than 30% impairment on the Modified Oswestry for improved functional mobility.  Ongoing-52% impaired  4. Pt to report 50% decrease of pain/numbness/tingling into the right leg to show decreased nerve compression.  Met-60% improved  5. Patient will be able to lift and carry 20# with proper body mechanics and no increase in back pain to allow for greater ease with daily tasks.  Ongoing      Plan  Therapy options: will be seen for skilled therapy services  Planned  modality interventions: cryotherapy, TENS, electrical stimulation/Russian stimulation, thermotherapy (hydrocollator packs), ultrasound and traction  Planned therapy interventions: manual therapy, ADL retraining, balance/weight-bearing training, neuromuscular re-education, body mechanics training, postural training, soft tissue mobilization, flexibility, spinal/joint mobilization, functional ROM exercises, strengthening, gait training, stretching, home exercise program, therapeutic activities, IADL retraining, wheelchair management/propulsion training and joint mobilization  Frequency: 2x week  Duration in weeks: 4  Treatment plan discussed with: patient  Plan details: Re-evaluation   43747    Therapeutic exercise  92975  Therapeutic activity    84658  Neuromuscular re-education   03104  Manual therapy   35709  Gait training  44196    Unattended e-stim (Private)  84878  Unattended e-stim (Medicaid/Medicare)     Moist heat/cryotherapy 39591   Ultrasound   55950  Mechanical traction 62570           Recommendations: Continue as planned  Timeframe: 1 month  Prognosis to achieve goals: good      Timed:         Manual Therapy:    12     mins  15225;     Therapeutic Exercise:    28     mins  04081;     Neuromuscular Ruth:        mins  54283;    Therapeutic Activity:          mins  58468;     Gait Training:           mins  11052;     Ultrasound:          mins  00989;    Ionto                                   mins   22408  Self Care                            mins   43811    Un-Timed:  Electrical Stimulation:         mins  25230 ( );  Dry Needling          mins self-pay  Traction          mins 88298  Re-Eval                               mins  84307  Canalith Repos         mins 18751  Ice-10 min    Timed Treatment:   40   mins   Total Treatment:     50   mins          PT: Jacqui Desai PT     [place] License:  [id number]    Electronically signed by Jacqui Desai PT, 10/04/23, 8:58 AM EDT    Certification  Period: 10/4/2023 thru 1/1/2024  I certify that the therapy services are furnished while this patient is under my care.  The services outlined above are required by this patient, and will be reviewed every 90 days.         Physician Signature:__________________________________________________    PHYSICIAN: Alexis Sanchez MD  NPI: [id number]                                      DATE:  :     Please sign and return via fax to [email or web address] . Thank you, [facility] Physical Therapy

## 2023-10-04 NOTE — PROGRESS NOTES
Physical Therapy Re Certification Of Plan of Care  Patient: Teo Griffith   : 1985  Diagnosis/ICD-10 Code:  Chronic right shoulder pain [M25.511, G89.29]  Referring practitioner: Gregorio Thomas DO  Date of Initial Visit: Type: THERAPY  Noted: 2023  Today's Date: 10/4/2023  Patient seen for 33 sessions         Visit Diagnoses:    ICD-10-CM ICD-9-CM   1. Chronic right shoulder pain  M25.511 719.41    G89.29 338.29   2. S/P arthroscopy of right shoulder  Z98.890 V45.89   3. Decreased ROM of right shoulder  M25.611 719.51         Teo Griffith reports: He feels like his shoulder is very stiff today.  He notes that it is getting a little stronger, but still hurts when lifting to the side.  Patient has a follow-up appt with MD in December.  Subjective Questionnaire: QuickDASH: 38.64% impaired  Clinical Progress: improved  Home Program Compliance: Yes    Subjective Evaluation    Pain  Current pain ratin  At best pain ratin  At worst pain ratin           Objective          Active Range of Motion     Right Shoulder   Flexion: 138 degrees   Abduction: 126 degrees   External rotation 90°: 49 degrees  Internal rotation 90°: 45 degrees     Strength/Myotome Testing     Right Shoulder     Planes of Motion   Flexion: 4-   Abduction: 3+   External rotation at 0°: 4-   Internal rotation at 0°: 4         Assessment & Plan       Assessment  Impairments: abnormal muscle firing, abnormal or restricted ROM, activity intolerance, impaired physical strength, lacks appropriate home exercise program, pain with function and safety issue   Functional limitations: carrying objects, lifting, sleeping, pulling, pushing, uncomfortable because of pain, stooping, reaching behind back and reaching overhead   Assessment details: Patient has been attending therapy for treatment s/p a right shoulder arthroscopy; he has attended therapy for a total of 33 sessions.  Patient continues to show improvements with skilled PT.   Improvements are noted with right shoulder ROM and right UE strength.  Greatest weakness continues to be noted with right shoulder abduction.  He currently reports 38.64% functional mobility impairment, based on his response to the QuickDash.  He will continue to benefit from skilled PT so that he can achieve his maximum level of function.  Prognosis: good    Goals  Plan Goals: STG 6 weeks  1 Pt will be instructed in a HEP.  Met  2 Pt will report a 50% decrease in radicular symptoms.  Ongoing, progressing  3 Pt will demonstrate 120 degree of arom shoulder flexion.  Met  Pt will improve his Quick Dash to less than 40%.   Met    LTG 12 weeks  1. Pt will report back pain no greater than 3/10 with moderate lifting.  Ongoing, progressing  2. Pt will demonstrate 4/5 right shoulder gross strength.  Ongoing, progressing  3. Pt will improve his Quick Dash score to less than 15%.  Ongoing, progress  4. Pt will demonstrate 140 degrees of AROM flexion with no distress.  Met      Plan  Therapy options: will be seen for skilled therapy services  Planned modality interventions: cryotherapy, TENS, thermotherapy (hydrocollator packs), traction and ultrasound  Planned therapy interventions: abdominal trunk stabilization, ADL retraining, body mechanics training, flexibility, functional ROM exercises, home exercise program, IADL retraining, joint mobilization, manual therapy, neuromuscular re-education, postural training, soft tissue mobilization, strengthening, stretching and therapeutic activities  Frequency: 2x week  Duration in weeks: 8  Treatment plan discussed with: patient  Plan details: Re-evaluation   82646  Therapeutic exercise  01525  Therapeutic activity    84782  Neuromuscular re-education   07885  Manual therapy   81701  Gait training  56557    Unattended e-stim (Medicaid/Medicare)     Moist heat/cryotherapy 57773   Ultrasound   80874  Mechanical traction 11900           Recommendations: Continue as  planned  Timeframe: 2 months  Prognosis to achieve goals: good      Timed:         Manual Therapy:         mins  75645;     Therapeutic Exercise:    35     mins  62213;     Neuromuscular Ruth:        mins  42278;    Therapeutic Activity:     10     mins  97799;     Gait Training:           mins  66339;     Ultrasound:          mins  97252;    Ionto                                   mins   90082  Self Care                            mins   46054    Un-Timed:  Electrical Stimulation:     10    mins  77280 (MC );  Dry Needling          mins self-pay  Traction          mins 75403  Re-Eval                               mins  97949  Canalith Repos         mins 96092    Timed Treatment:   45   mins   Total Treatment:     55   mins          PT: Jacqui Desai PT     [place] License:  [id number]    Electronically signed by Jacqui Desai PT, 10/04/23, 9:41 AM EDT    Certification Period: 10/4/2023 thru 1/1/2024  I certify that the therapy services are furnished while this patient is under my care.  The services outlined above are required by this patient, and will be reviewed every 90 days.         Physician Signature:__________________________________________________    PHYSICIAN: Gregorio Thomas DO  NPI: [id number]                                      DATE:  :     Please sign and return via fax to [email or web address] . Thank you, [facility] Physical Therapy

## 2023-10-09 ENCOUNTER — TREATMENT (OUTPATIENT)
Dept: PHYSICAL THERAPY | Facility: CLINIC | Age: 38
End: 2023-10-09
Payer: OTHER GOVERNMENT

## 2023-10-09 DIAGNOSIS — M54.41 CHRONIC BILATERAL LOW BACK PAIN WITH RIGHT-SIDED SCIATICA: Primary | ICD-10-CM

## 2023-10-09 DIAGNOSIS — M25.611 DECREASED ROM OF RIGHT SHOULDER: ICD-10-CM

## 2023-10-09 DIAGNOSIS — M53.86 DECREASED ROM OF LUMBAR SPINE: ICD-10-CM

## 2023-10-09 DIAGNOSIS — Z98.890 S/P ARTHROSCOPY OF RIGHT SHOULDER: ICD-10-CM

## 2023-10-09 DIAGNOSIS — M25.511 CHRONIC RIGHT SHOULDER PAIN: Primary | ICD-10-CM

## 2023-10-09 DIAGNOSIS — G89.29 CHRONIC BILATERAL LOW BACK PAIN WITH RIGHT-SIDED SCIATICA: Primary | ICD-10-CM

## 2023-10-09 DIAGNOSIS — G89.29 CHRONIC RIGHT SHOULDER PAIN: Primary | ICD-10-CM

## 2023-10-09 PROCEDURE — 97110 THERAPEUTIC EXERCISES: CPT | Performed by: PHYSICAL THERAPIST

## 2023-10-09 PROCEDURE — 97140 MANUAL THERAPY 1/> REGIONS: CPT | Performed by: PHYSICAL THERAPIST

## 2023-10-09 PROCEDURE — 97530 THERAPEUTIC ACTIVITIES: CPT | Performed by: PHYSICAL THERAPIST

## 2023-10-09 PROCEDURE — G0283 ELEC STIM OTHER THAN WOUND: HCPCS | Performed by: PHYSICAL THERAPIST

## 2023-10-09 NOTE — PROGRESS NOTES
OT ACUTE Treatment Session    Pt seen on 4 nursing unit.                                                          Frequency Comments: MThF     Admitting complaint:: Ataxia [R27.0]  Hip pain [M25.559]  Left hip pain [M25.552]  Strain of flexor muscle of left hip, initial encounter [S76.012A]  Failure to thrive in adult [R62.7]                                                                                         Precautions  Other Precautions: History of polio, fibromyalgia; surgeries on RLE with minimal movement, left hip pain (02/20/20 1148)  Precautions Comments: Patient with history of polio, multiple surgeries on RLE, uses crutches at baseline (02/19/20 1038)      ASSESSMENT:  Pt with fair tolerance during BUE ex's while supine in bed and setup/independent level for grooming and self feeding tasks. Pt required total assist for LB dressing tasks of gripper socks and reports 10/10 LLE pain with slight movement. Attempted bed mobility and due to pt's level of pain in LLE, unable to complete. Recently received pain meds and reports 4/10 pain at rest however with activity pain increases significantly. Plan to continue skilled OT services to progress towards goals. Pt in agreement with plan of care.      RECOMMENDATIONS FOR DISCHARGE:  Recommendations for Discharge: OT WI: Sub-acute nursing home (02/23/20 0830)    OT Identified Barriers to Discharge: pain, patient lives alone     PT/OT Mobility Equipment for Discharge: Pt. owns crutches, present in room, and manual wheelchair. Likely no needs. (02/21/20 1428)  PT/OT ADL Equipment for Discharge: Likely no needs, patient has tub transfer bench at home (02/23/20 0830)    Assistance needed when returning home:   Discussed plan for patient to discharge to subacute rehab for ongoing rehabilitation due to ongoing functional limitations    ICU Mobility Assesment (PERME):         PLAN: Continue skilled OT, including the following Treatment Interventions: ADL  Physical Therapy Daily Treatment Note      Patient: Teo Griffith   : 1985  Referring practitioner: Gregorio Thomas DO  Date of Initial Visit: Type: THERAPY  Noted: 2023  Today's Date: 10/9/2023  Patient seen for 34 sessions       Visit Diagnoses:    ICD-10-CM ICD-9-CM   1. Chronic right shoulder pain  M25.511 719.41    G89.29 338.29   2. S/P arthroscopy of right shoulder  Z98.890 V45.89   3. Decreased ROM of right shoulder  M25.611 719.51       Subjective: Patient reports 3/10 right shoulder pain prior to tx.  Otherwise pt notes of no new complaints/ changes.     Objective   See Exercise, Manual, and Modality Logs for complete treatment.       Assessment/Plan: Patient responded well to today's session with no complaints of pain increase following, 3/10. Treatment consisted of therex and therapeutic activities per flow sheet for right shoulder to assist with range of motion deficits, improve strength and shoulder stability. Activities progressed with resistance of tband increased from red to green with internal/ external rotation with good tolerance observed. Minimal cueing required for form. Cryotherapy with Estim applied at conclusion. Pt will be progressed with therapy as tolerated. No adverse reactions observed during, and/ or following tx. Continue with PT's POC.      Timed:         Manual Therapy:         mins  11021;     Therapeutic Exercise:   34      mins  32428;     Neuromuscular Ruth:        mins  62515;    Therapeutic Activity:      10    mins  33715;     Gait Training:           mins  73360;     Ultrasound:          mins  68570;    Ionto                                   mins   60558  Self Care                            mins   54052      Un-Timed:  Electrical Stimulation:    10     mins  21302 ( );  Dry Needling          mins self-pay  Traction          mins 27519  Canalith Repos         mins 29632    Timed Treatment:   44   mins   Total Treatment:    54    mins    Natalie Smith.  Linda, PTA  [place] License: [id number]                   retraining;Functional transfer training;Patient/Family training (02/19/20 1038)   Treatment Plan for Next Session: toilet/commode/recliner transfer, further assessment of self-care tasks          EDUCATION:   On this date, the patient was educated on role of OT, plan of care, importance of activity, transfer techniques, BUE ex's.    The response to education was: Verbalizes understanding and Needs reinforcement                                                    SUBJECTIVE:     Subjective: pt agreeable to therapy session, \"I am just deconditioned, I haven't been up in a few days.\" (02/23/20 0830)  Subjective/Objective Comments: RN Dai aware of therapy session. pt supine in bed at end of session with alarm in place. (02/23/20 0830)    OBJECTIVE:     RN reported [place] Fall Scale Score: 95       Last 24 hours of Functional Data     ADLs  Self Cares/ADL's  Eating Assistance: Modified independent;Supine, bed(head of bed elevated) (02/23/20 0830)  Eating Deficit: Beverage management;Bringing food to mouth assist(finishing breakfast upon arrival to room) (02/23/20 0830)  Footwear Assistance: Total Assist - Dependent (02/23/20 0830)  Lower Body Dressing Deficit: Don/doff R sock;Don/doff L sock (02/23/20 0830)    Household mobility  Household Mobility  Household Mobility Comments #1: Attempted bed mobility, however with LLE movement, pt reports 10/10, however at rest in bed pt reports 4/10 at rest in bed. (02/23/20 0830)    Home Management       Other Interventions  Other Interventions 1: BUE AROM ex's while supine in bed with head of bed elevated: 10 reps of each with encouragement to perform several times daily as tolerates (02/23/20 0830)      Tolerance  OT Activity Tolerance  Activity Tolerance: 1:2 Activity to rest (02/23/20 0830)  Activity Tolerance Comments: limited tolerance for activity due to 10/10 pain in LLE with slight movt (02/23/20 0830)    Cognition       Patient's Personal Goal: Return home, have less pain  (02/19/20 1038)    Therapy Goals:    Goals  Short Term Goals to Be Reviewed On: 02/26/20 (02/19/20 1038)  Short Term Goals Are The Same as Discharge Goals: Yes (02/19/20 1038)  Goal Agreement: Patient agrees with goals and treatment plan (02/19/20 1038)  Grooming Discharge Goal 1: Mod I (02/19/20 1038)  Dressing Discharge Goal 1: Mod I (02/19/20 1038)  Toileting Discharge Goal 1: Mod I (02/19/20 1038)  Home Setting Transfer Discharge Goal 1: Mod I (02/19/20 1038)        Total Treatment Time:  OT Time Spent: 45 minutes (02/23/20 0830)    See OT flowsheet for full details regarding the OT therapy provided.

## 2023-10-09 NOTE — PROGRESS NOTES
Physical Therapy Daily Treatment Note      Patient: Teo Griffith   : 1985  Referring practitioner: Alexis Sanchez MD  Date of Initial Visit: Type: THERAPY  Noted: 2023  Today's Date: 10/9/2023  Patient seen for 8 sessions       Visit Diagnoses:    ICD-10-CM ICD-9-CM   1. Chronic bilateral low back pain with right-sided sciatica  M54.41 724.2    G89.29 724.3     338.29   2. Decreased ROM of lumbar spine  M53.86 724.9       Subjective: Patient reports 3/10 low back pain upon arrival to therapy. Pt states he is scheduled to f/u with [facility] today.      Objective   See Exercise, Manual, and Modality Logs for complete treatment.       Assessment/Plan: Today's treatment initiated with manual therapy to lumbar region including STM to address muscular tightness and to improve mobility f/b therex as listed and modalities at conclusion. Exercise progressed with strengthening reps increased as tolerated. Pt observed with good tolerance with cues provided to maintain posture during standing resisted hip extension. Pt had no complaints of pain increase at conclusion. Pt continues to benefit from therapy services and will be progressed as tolerated to address goals, reduce pain and improve function. No adverse reactions observed during, and/ or following tx. Continue with PT's POC.       Timed:         Manual Therapy:    13     mins  30617;     Therapeutic Exercise:   27      mins  18093;     Neuromuscular Ruth:        mins  82784;    Therapeutic Activity:          mins  54363;     Gait Training:           mins  88515;     Ultrasound:          mins  50832;    Ionto                                   mins   33416  Self Care                            mins   50585      Un-Timed:  Electrical Stimulation:         mins  19222 ( );  Dry Needling          mins self-pay  Traction          mins 28515  Canalith Repos         mins 02764    Timed Treatment:  40    mins   Total Treatment:     40    jalil Smith. Linda, PTA  [place] License: [id number]

## 2023-10-11 ENCOUNTER — TREATMENT (OUTPATIENT)
Dept: PHYSICAL THERAPY | Facility: CLINIC | Age: 38
End: 2023-10-11
Payer: OTHER GOVERNMENT

## 2023-10-11 DIAGNOSIS — M54.41 CHRONIC BILATERAL LOW BACK PAIN WITH RIGHT-SIDED SCIATICA: Primary | ICD-10-CM

## 2023-10-11 DIAGNOSIS — G89.29 CHRONIC BILATERAL LOW BACK PAIN WITH RIGHT-SIDED SCIATICA: Primary | ICD-10-CM

## 2023-10-11 DIAGNOSIS — G89.29 CHRONIC RIGHT SHOULDER PAIN: Primary | ICD-10-CM

## 2023-10-11 DIAGNOSIS — M25.611 DECREASED ROM OF RIGHT SHOULDER: ICD-10-CM

## 2023-10-11 DIAGNOSIS — Z98.890 S/P ARTHROSCOPY OF RIGHT SHOULDER: ICD-10-CM

## 2023-10-11 DIAGNOSIS — M53.86 DECREASED ROM OF LUMBAR SPINE: ICD-10-CM

## 2023-10-11 DIAGNOSIS — M25.511 CHRONIC RIGHT SHOULDER PAIN: Primary | ICD-10-CM

## 2023-10-11 PROCEDURE — 97110 THERAPEUTIC EXERCISES: CPT | Performed by: PHYSICAL THERAPIST

## 2023-10-11 PROCEDURE — 97140 MANUAL THERAPY 1/> REGIONS: CPT | Performed by: PHYSICAL THERAPIST

## 2023-10-11 NOTE — PROGRESS NOTES
Physical Therapy Daily Treatment Note      Patient: Teo Griffith   : 1985  Referring practitioner: Gregorio Thomas DO  Date of Initial Visit: Type: THERAPY  Noted: 2023  Today's Date: 10/11/2023  Patient seen for 35 sessions       Visit Diagnoses:    ICD-10-CM ICD-9-CM   1. Chronic right shoulder pain  M25.511 719.41    G89.29 338.29   2. S/P arthroscopy of right shoulder  Z98.890 V45.89   3. Decreased ROM of right shoulder  M25.611 719.51       Subjective Evaluation    Pain  Current pain rating: 3    : Patient states he continues to have difficulty with overhead activity and sleeping on his shoulder. Pt notes when he sleeps on his shoulder he has pain into the collar bone as well. Pt reports he will be unable to attend therapy next week due to being out of town with [employer].      Objective   See Exercise, Manual, and Modality Logs for complete treatment.       Assessment/Plan: Patient completed today's session with no changes in pain following, 3/10. Activities for right shoulder performed with continued focus on improved range of motion, strength, and ability to perform functional tasks. Pt reported intermittent crepitus in shoulder with prone row; however, no complaints of pain increase. Pt educated to continue to monitor symptoms; pt verbalized understanding. Pt provided with intermittent cues and demonstration as needed during exercise for form and scapular awareness. Cryotherapy with Estim applied at conclusion. Pt will be progressed with therapy as tolerated, per protocol, and per MD recommendation. No adverse reactions or signs of distress observed during, and/ or following tx. Continue with PT's POC.     Timed:         Manual Therapy:         mins  40042;     Therapeutic Exercise:    29     mins  90003;     Neuromuscular Ruth:        mins  91507;    Therapeutic Activity:      14    mins  92763;     Gait Training:           mins  17876;     Ultrasound:          mins  41856;    Ionto                                    mins   50959  Self Care                            mins   26182      Un-Timed:  Electrical Stimulation:    10     mins  91522 ( );  Dry Needling          mins self-pay  Traction          mins 80547  Canalith Repos         mins 77279    Timed Treatment:   43   mins   Total Treatment:    53    mins    Natalie Smith. ELHAM Mckeon  [place] License: [id number]

## 2023-10-11 NOTE — PROGRESS NOTES
Physical Therapy Daily Treatment Note      Patient: Teo Griffith   : 1985  Referring practitioner: Alexis Sanchez MD  Date of Initial Visit: Type: THERAPY  Noted: 2023  Today's Date: 10/11/2023  Patient seen for 9 sessions       Visit Diagnoses:    ICD-10-CM ICD-9-CM   1. Chronic bilateral low back pain with right-sided sciatica  M54.41 724.2    G89.29 724.3     338.29   2. Decreased ROM of lumbar spine  M53.86 724.9       Subjective: Patient reports 3/10 low back and pain in the right buttock region upon arrival to therapy. Pt had appointment with [facility] on Monday and is scheduled to return in December for f/u and to schedule another epidural. Pt will be unable to attend therapy next week due to being out of town for the [employer].     Objective   See Exercise, Manual, and Modality Logs for complete treatment.       Assessment/Plan: Today's treatment completed with no changes in back pain following. Session initiated with manual therapy to address tightness and assist with pain control f/b activities to reduce radicular symptoms, improve lumbar/ hip stability and postural strength. Exercise progressed with additional strengthening activities added to program including mini squats. Pt observed with good tolerance and was provided with cues as needed. Pt continues to demonstrate increased muscular tightness along bilateral paraspinals during manual therapy. Pt will be progressed with therapy as tolerated to address goals, reduce pain and improve function. No adverse reactions or signs of distress observed during, and/ or following tx. Continue with PT's POC.       Timed:         Manual Therapy:   14      mins  67471;     Therapeutic Exercise:    28     mins  09313;     Neuromuscular Ruth:        mins  94274;    Therapeutic Activity:          mins  53565;     Gait Training:           mins  75057;     Ultrasound:          mins  80720;    Ionto                                    mins   55761  Self Care                            mins   63916      Un-Timed:  Electrical Stimulation:         mins  15598 ( );  Dry Needling          mins self-pay  Traction          mins 08088  Canalith Repos         mins 11116    Timed Treatment:  42    mins   Total Treatment:     52   mins (CP: x 10 min)    Natalie Smith. Linda, PTA  [place] License: [id number]

## 2023-10-23 ENCOUNTER — TREATMENT (OUTPATIENT)
Dept: PHYSICAL THERAPY | Facility: CLINIC | Age: 38
End: 2023-10-23
Payer: OTHER GOVERNMENT

## 2023-10-23 DIAGNOSIS — Z98.890 S/P ARTHROSCOPY OF RIGHT SHOULDER: ICD-10-CM

## 2023-10-23 DIAGNOSIS — M25.511 CHRONIC RIGHT SHOULDER PAIN: Primary | ICD-10-CM

## 2023-10-23 DIAGNOSIS — G89.29 CHRONIC BILATERAL LOW BACK PAIN WITH RIGHT-SIDED SCIATICA: Primary | ICD-10-CM

## 2023-10-23 DIAGNOSIS — G89.29 CHRONIC RIGHT SHOULDER PAIN: Primary | ICD-10-CM

## 2023-10-23 DIAGNOSIS — M53.86 DECREASED ROM OF LUMBAR SPINE: ICD-10-CM

## 2023-10-23 DIAGNOSIS — M54.41 CHRONIC BILATERAL LOW BACK PAIN WITH RIGHT-SIDED SCIATICA: Primary | ICD-10-CM

## 2023-10-23 PROCEDURE — 97110 THERAPEUTIC EXERCISES: CPT | Performed by: PHYSICAL THERAPIST

## 2023-10-23 PROCEDURE — G0283 ELEC STIM OTHER THAN WOUND: HCPCS | Performed by: PHYSICAL THERAPIST

## 2023-10-23 PROCEDURE — 97140 MANUAL THERAPY 1/> REGIONS: CPT | Performed by: PHYSICAL THERAPIST

## 2023-10-23 PROCEDURE — 97530 THERAPEUTIC ACTIVITIES: CPT | Performed by: PHYSICAL THERAPIST

## 2023-10-23 NOTE — PROGRESS NOTES
Physical Therapy Daily Treatment Note      Patient: Teo Griffith   : 1985  Referring practitioner: Gregorio Thomas DO  Date of Initial Visit: Type: THERAPY  Noted: 2023  Today's Date: 10/23/2023  Patient seen for 36 sessions       Visit Diagnoses:    ICD-10-CM ICD-9-CM   1. Chronic right shoulder pain  M25.511 719.41    G89.29 338.29   2. S/P arthroscopy of right shoulder  Z98.890 V45.89       Subjective Evaluation    History of Present Illness    Subjective comment: Patient reports 4/10 pain today.  He notes that last Friday, the incision on his shoulder split open again.Pain  Current pain ratin         Objective   See Exercise, Manual, and Modality Logs for complete treatment.       Assessment & Plan       Assessment  Assessment details: Therapy session initiated with there ex and therapeutic activities, per flow sheet.  Patient progressed to include increased repetitions.  Exercises continue to focus on shoulder ROM, UE strengthening, and scapular stabilization.  Patient reported mild discomfort with Y wall slides.  Treatment was concluded with ice/ESTIM, with no adverse reactions.  Tim Jackson, PT, assisted with concluding today's session.He will continue to be progressed per his tolerance and POC.      Timed:         Manual Therapy:         mins  34157;     Therapeutic Exercise:    33     mins  68646;     Neuromuscular Ruth:        mins  47795;    Therapeutic Activity:     14     mins  93032;     Gait Training:           mins  79471;     Ultrasound:          mins  11940;    Ionto                                   mins   79797  Self Care                            mins   22770      Un-Timed:  Electrical Stimulation:    10     mins  88187 ( );  Dry Needling          mins self-pay  Traction          mins 29423  Canalith Repos         mins 21453    Timed Treatment:   47   mins   Total Treatment:     57   mins    Jacqui Desai PT  [place] License: [id number]  Electronically signed by  Jacqui Desai, PT, 10/23/23, 11:09 AM EDT.

## 2023-10-23 NOTE — PROGRESS NOTES
Physical Therapy Daily Treatment Note      Patient: Teo Griffith   : 1985  Referring practitioner: Alexis Sanchez MD  Date of Initial Visit: Type: THERAPY  Noted: 2023  Today's Date: 10/23/2023  Patient seen for 10 sessions       Visit Diagnoses:    ICD-10-CM ICD-9-CM   1. Chronic bilateral low back pain with right-sided sciatica  M54.41 724.2    G89.29 724.3     338.29   2. Decreased ROM of lumbar spine  M53.86 724.9       Subjective Evaluation    History of Present Illness    Subjective comment: Patient reports that he has 3/10 pain today for his back.  He notes that he will be returning for another medical evaluation with the [employer] in mid-November.Pain  Current pain rating: 3         Objective   See Exercise, Manual, and Modality Logs for complete treatment.       Assessment & Plan       Assessment  Assessment details: Therapy session initiated with manual therapy to the lumbar region, consisting of STM and PA glides.  Manual therapy focused on decreasing pain and improving mobility.  There ex performed per flow sheet, with exercises progressed to include increased repetitions and the addition of standing hip abduction with theraband.  Session concluded with cryotherapy.  Patient will continue to be progressed per his tolerance and POC.          Timed:         Manual Therapy:    15     mins  14285;     Therapeutic Exercise:    27    mins  80939;     Neuromuscular Ruth:        mins  97580;    Therapeutic Activity:          mins  78641;     Gait Training:           mins  24785;     Ultrasound:          mins  57194;    Ionto                                   mins   55135  Self Care                            mins   29021      Un-Timed:  Electrical Stimulation:         mins  66228 ( );  Dry Needling          mins self-pay  Traction          mins 63437  Canalith Repos         mins 86160  Ice-10 min  Timed Treatment:   42   mins   Total Treatment:     52   mins    Jacqui Desai  PT  [place] License: [id number]  Electronically signed by Jacqui Desai, PT, 10/23/23, 10:36 AM EDT.

## 2023-10-25 ENCOUNTER — TREATMENT (OUTPATIENT)
Dept: PHYSICAL THERAPY | Facility: CLINIC | Age: 38
End: 2023-10-25
Payer: OTHER GOVERNMENT

## 2023-10-25 DIAGNOSIS — G89.29 CHRONIC BILATERAL LOW BACK PAIN WITH RIGHT-SIDED SCIATICA: Primary | ICD-10-CM

## 2023-10-25 DIAGNOSIS — M54.41 CHRONIC BILATERAL LOW BACK PAIN WITH RIGHT-SIDED SCIATICA: Primary | ICD-10-CM

## 2023-10-25 DIAGNOSIS — G89.29 CHRONIC RIGHT SHOULDER PAIN: ICD-10-CM

## 2023-10-25 DIAGNOSIS — Z98.890 S/P ARTHROSCOPY OF RIGHT SHOULDER: Primary | ICD-10-CM

## 2023-10-25 DIAGNOSIS — M25.511 CHRONIC RIGHT SHOULDER PAIN: ICD-10-CM

## 2023-10-25 DIAGNOSIS — M25.611 DECREASED ROM OF RIGHT SHOULDER: ICD-10-CM

## 2023-10-25 DIAGNOSIS — M53.86 DECREASED ROM OF LUMBAR SPINE: ICD-10-CM

## 2023-10-25 PROCEDURE — 97140 MANUAL THERAPY 1/> REGIONS: CPT | Performed by: PHYSICAL THERAPIST

## 2023-10-25 PROCEDURE — 97110 THERAPEUTIC EXERCISES: CPT | Performed by: PHYSICAL THERAPIST

## 2023-10-25 NOTE — PROGRESS NOTES
Physical Therapy Re Certification Of Plan of Care  Patient: Teo Griffith   : 1985  Diagnosis/ICD-10 Code:  Chronic bilateral low back pain with right-sided sciatica [M54.41, G89.29]  Referring practitioner: Alexis Sanchez MD  Date of Initial Visit: Type: THERAPY  Noted: 2023  Today's Date: 10/25/2023  Patient seen for 11 sessions         Visit Diagnoses:    ICD-10-CM ICD-9-CM   1. Chronic bilateral low back pain with right-sided sciatica  M54.41 724.2    G89.29 724.3     338.29   2. Decreased ROM of lumbar spine  M53.86 724.9       Subjective Questionnaire: Modified Oswestry:   Clinical Progress: unchanged  Home Program Compliance: Yes    Subjective Evaluation    History of Present Illness    Subjective comment: Patient reports that he continues to have trouble with sleeping, because of his back.  He notes that he has sitting and standing tolerance of 1 hour, due to increased pain.Pain  Current pain rating: 3  At best pain rating: 3  At worst pain ratin           Objective          Active Range of Motion     Lumbar   Flexion: Active lumbar flexion: 50%   Extension: Active lumbar extension: 25%   Left rotation: Active left lumbar rotation: 75%   Right rotation: Active right lumbar rotation: 75%     Strength/Myotome Testing     Left Hip   Planes of Motion   Flexion: 4+  Abduction: 4+  Adduction: 4    Right Hip   Planes of Motion   Flexion: 4  Abduction: 4+  Adduction: 4    Left Knee   Flexion: 4+  Extension: 4+    Right Knee   Flexion: 4+  Extension: 4          Assessment & Plan       Assessment  Impairments: abnormal gait, abnormal muscle firing, abnormal or restricted ROM, activity intolerance, impaired physical strength, lacks appropriate home exercise program and pain with function   Functional limitations: lifting, sleeping, pulling, pushing, uncomfortable because of pain, moving in bed, sitting, standing, stooping and unable to perform repetitive tasks   Assessment details: Patient  has been attending outpatient physical therapy for low back pain; he has attended therapy for a total of 11 sessions.  No significant changes were noted in lumbar ROM and LE strength since last progress note.  Patient continues to note elevated pain levels, reporting 3/10 pain at best and 5/10 pain at worst.  Patient reports a 52% functional mobility impairment, based on his response to the Modified Oswestry.  Patient has met 3/4 STGs and 1/5 LTGs.  He will continue to benefit from skilled PT so that he can achieve his maximum level of function.  Prognosis: good    Goals  Plan Goals: SHORT TERM GOALS:     4 weeks  1. Patient will be independent/compliant with HEP.  Met  2. Patient will report pain no greater than 3/10 when performing self-care activities.  Met-3/10 with ADLs  3. Patient will report pain no greater than 3/10 when sitting for 1 hour to travel to appointments.  Ongoing, progressing-up to 5/10  4. Patient will be educated in proper lifting mechanics for improved back safety and be able to demonstrate on 4/5 trials.  Met    LONG TERM GOALS:   8 weeks  1. Patient will show a 25% improvement of lumbar AROM to show improved ability to perform functional activities.  Ongoing, progressing  2. Bilateral LE strength will improve to at least 4+/5 to allow for greater ease with daily tasks.  Ongoing, progressing  3. Patient to report less than 30% impairment on the Modified Oswestry for improved functional mobility.  Ongoing-52% impaired  4. Pt to report 50% decrease of pain/numbness/tingling into the right leg to show decreased nerve compression.  Met  5. Patient will be able to lift and carry 20# with proper body mechanics and no increase in back pain to allow for greater ease with daily tasks.  Ongoing, progressing-reports increased hip and back pain      Plan  Therapy options: will be seen for skilled therapy services  Planned modality interventions: cryotherapy, TENS, electrical stimulation/[place]  stimulation, thermotherapy (hydrocollator packs), ultrasound and traction  Planned therapy interventions: manual therapy, ADL retraining, balance/weight-bearing training, neuromuscular re-education, body mechanics training, postural training, soft tissue mobilization, flexibility, spinal/joint mobilization, functional ROM exercises, strengthening, gait training, stretching, home exercise program, therapeutic activities, IADL retraining, wheelchair management/propulsion training and joint mobilization  Frequency: 2x week  Duration in weeks: 8  Treatment plan discussed with: patient  Plan details: Re-evaluation   55631    Therapeutic exercise  28871  Therapeutic activity    58991  Neuromuscular re-education   03429  Manual therapy   17591  Gait training  70336    Unattended e-stim (Private)  96908  Unattended e-stim (Medicaid/Medicare)     Moist heat/cryotherapy 48493   Ultrasound   43204  Mechanical traction 38863           Recommendations: Continue as planned  Timeframe: 2 months  Prognosis to achieve goals: good      Timed:         Manual Therapy:    15     mins  78208;     Therapeutic Exercise:    29     mins  40616;     Neuromuscular Ruth:        mins  21454;    Therapeutic Activity:          mins  01941;     Gait Training:           mins  74168;     Ultrasound:          mins  01369;    Ionto                                   mins   63405  Self Care                            mins   61327    Un-Timed:  Electrical Stimulation:         mins  94598 ( );  Dry Needling          mins self-pay  Traction          mins 01801  Re-Eval                               mins  66358  Canalith Repos         mins 84433  Ice-10 min  Timed Treatment:   44   mins   Total Treatment:     54   mins          PT: Jacqui Desai PT     [place] License:  [id number]    Electronically signed by Jacqui Desai PT, 10/25/23, 9:17 AM EDT    Certification Period: 10/25/2023 thru 1/22/2024  I certify that the therapy services are  furnished while this patient is under my care.  The services outlined above are required by this patient, and will be reviewed every 90 days.         Physician Signature:__________________________________________________    PHYSICIAN: Alexis Sanchez MD  NPI: [id number]                                      DATE:  :     Please sign and return via fax to [email or web address] . Thank you, [facility]

## 2023-10-25 NOTE — PROGRESS NOTES
"  Physical Therapy Progress Note  Patient: Teo Griffith   : 1985  Diagnosis/ICD-10 Code:  S/P arthroscopy of right shoulder [Z98.890]  Referring practitioner: Gregorio Thomas DO  Date of Initial Visit: Type: THERAPY  Noted: 2023  Today's Date: 10/25/2023  Patient seen for 37 sessions         Visit Diagnoses:    ICD-10-CM ICD-9-CM   1. S/P arthroscopy of right shoulder  Z98.890 V45.89   2. Decreased ROM of right shoulder  M25.611 719.51   3. Chronic right shoulder pain  M25.511 719.41    G89.29 338.29       Subjective Questionnaire: QuickDASH: 52.27% impaired  Clinical Progress: worse  Home Program Compliance: Yes      Subjective Evaluation    History of Present Illness    Subjective comment: Patient reports that his shoulder feels more painful first thing in the morning, noting that he feels like \"it takes a while to get it moving.\"  He thinks increased pain in the morning, might be due to how he sleeps.  He mentions that he does continue to get some tingling in his fingers, noting it is in the 3rd-5th digits.Pain  Current pain rating: 3  At best pain rating: 3  At worst pain ratin           Objective          Active Range of Motion     Right Shoulder   Flexion: 132 degrees   Abduction: 100 degrees   External rotation 90°: 42 degrees  Internal rotation 90°: 54 degrees     Strength/Myotome Testing     Right Shoulder     Planes of Motion   Flexion: 3+   Abduction: 3+   External rotation at 0°: 3+   Internal rotation at 0°: 4           Assessment & Plan       Assessment  Impairments: abnormal muscle firing, abnormal or restricted ROM, activity intolerance, impaired physical strength, lacks appropriate home exercise program, pain with function and safety issue   Functional limitations: carrying objects, lifting, sleeping, pulling, pushing, uncomfortable because of pain, stooping, reaching behind back and reaching overhead   Assessment details: Patient has been attending therapy for treatment s/p a " right shoulder arthroscopy; he has attended therapy for a total of 37 sessions.  Patient has shown slight regression in right shoulder AROM and strength, since last progress note.  Weakness continues to be noted at the right shoulder in all directions.  He reports a 52.27% functional mobility impairment, based on his response to the QuickDash.  He will continue to benefit from skilled PT so that he can achieve his maximum level of function.  Prognosis: good    Goals  Plan Goals: STG 6 weeks  1 Pt will be instructed in a HEP.  Met  2 Pt will report a 50% decrease in radicular symptoms.  Ongoing, progressing  3 Pt will demonstrate 120 degree of arom shoulder flexion.  Met  Pt will improve his Quick Dash to less than 40%.   Met    LTG 12 weeks  1. Pt will report back pain no greater than 3/10 with moderate lifting.  Ongoing, progressing  2. Pt will demonstrate 4/5 right shoulder gross strength.  Ongoing, progressing  3. Pt will improve his Quick Dash score to less than 15%.  Ongoing, progress  4. Pt will demonstrate 140 degrees of AROM flexion with no distress.  Met      Plan  Therapy options: will be seen for skilled therapy services  Planned modality interventions: cryotherapy, TENS, thermotherapy (hydrocollator packs), traction and ultrasound  Planned therapy interventions: abdominal trunk stabilization, ADL retraining, body mechanics training, flexibility, functional ROM exercises, home exercise program, IADL retraining, joint mobilization, manual therapy, neuromuscular re-education, postural training, soft tissue mobilization, strengthening, stretching and therapeutic activities  Frequency: 2x week  Duration in weeks: 4  Treatment plan discussed with: patient  Plan details: Re-evaluation   66019  Therapeutic exercise  05088  Therapeutic activity    13619  Neuromuscular re-education   85966  Manual therapy   66354  Gait training  70396    Unattended e-stim (Medicaid/Medicare)     Moist heat/cryotherapy 59241    Ultrasound   79768  Mechanical traction 06968           Recommendations: Continue as planned  Timeframe: 1 month  Prognosis to achieve goals: good      Timed:         Manual Therapy:         mins  30367;     Therapeutic Exercise:    33     mins  76388;     Neuromuscular Ruth:        mins  02306;    Therapeutic Activity:     13     mins  74831;     Gait Training:           mins  72077;     Ultrasound:          mins  41530;    Ionto                                   mins   56633  Self Care                            mins   74359    Un-Timed:  Electrical Stimulation:    10     mins  26903 (MC );  Dry Needling          mins self-pay  Traction          mins 46071  Re-Eval                               mins  96186  Canalith Repos         mins 71482    Timed Treatment:   46  mins   Total Treatment:     56   mins          PT: Jacqui Desai PT     [place] License:  [id number]    Electronically signed by Jacqui Desai PT, 10/25/23, 9:38 AM EDT    Certification Period: 10/25/2023 thru 1/22/2024  I certify that the therapy services are furnished while this patient is under my care.  The services outlined above are required by this patient, and will be reviewed every 90 days.         Physician Signature:__________________________________________________    PHYSICIAN: Gregorio Thomas DO  NPI: [id number]                                      DATE:  :     Please sign and return via fax to [email or web address] . Thank you, [facility]

## 2023-11-06 ENCOUNTER — TREATMENT (OUTPATIENT)
Dept: PHYSICAL THERAPY | Facility: CLINIC | Age: 38
End: 2023-11-06
Payer: OTHER GOVERNMENT

## 2023-11-06 DIAGNOSIS — M54.41 CHRONIC BILATERAL LOW BACK PAIN WITH RIGHT-SIDED SCIATICA: Primary | ICD-10-CM

## 2023-11-06 DIAGNOSIS — Z98.890 S/P ARTHROSCOPY OF RIGHT SHOULDER: Primary | ICD-10-CM

## 2023-11-06 DIAGNOSIS — G89.29 CHRONIC RIGHT SHOULDER PAIN: ICD-10-CM

## 2023-11-06 DIAGNOSIS — M25.611 DECREASED ROM OF RIGHT SHOULDER: ICD-10-CM

## 2023-11-06 DIAGNOSIS — M53.86 DECREASED ROM OF LUMBAR SPINE: ICD-10-CM

## 2023-11-06 DIAGNOSIS — M25.511 CHRONIC RIGHT SHOULDER PAIN: ICD-10-CM

## 2023-11-06 DIAGNOSIS — G89.29 CHRONIC BILATERAL LOW BACK PAIN WITH RIGHT-SIDED SCIATICA: Primary | ICD-10-CM

## 2023-11-06 PROCEDURE — 97110 THERAPEUTIC EXERCISES: CPT | Performed by: PHYSICAL THERAPIST

## 2023-11-06 PROCEDURE — G0283 ELEC STIM OTHER THAN WOUND: HCPCS | Performed by: PHYSICAL THERAPIST

## 2023-11-06 PROCEDURE — 97530 THERAPEUTIC ACTIVITIES: CPT | Performed by: PHYSICAL THERAPIST

## 2023-11-06 PROCEDURE — 97140 MANUAL THERAPY 1/> REGIONS: CPT | Performed by: PHYSICAL THERAPIST

## 2023-11-06 NOTE — PROGRESS NOTES
Physical Therapy Daily Treatment Note      Patient: Teo Griffith   : 1985  Referring practitioner: Alexis Sanchez MD  Date of Initial Visit: Type: THERAPY  Noted: 2023  Today's Date: 2023  Patient seen for 12 sessions       Visit Diagnoses:    ICD-10-CM ICD-9-CM   1. Chronic bilateral low back pain with right-sided sciatica  M54.41 724.2    G89.29 724.3     338.29   2. Decreased ROM of lumbar spine  M53.86 724.9       Subjective Evaluation    History of Present Illness    Subjective comment: Patient reports that he has 3/10 back pain today.Pain  Current pain rating: 3         Objective   See Exercise, Manual, and Modality Logs for complete treatment.       Assessment & Plan       Assessment  Assessment details: Therapy sessio initiated with manual therapy to the lumbar region, consisting of STM and PA glides.  Following manual therapy, patient performed there ex that focused on core strengthening, LE strengthening, lumbar extension preference, and postural awareness.  Patient session concluded with cryotherapy, with no skin irritation.  He will continue to be progressed per his tolerance and POC.      Timed:         Manual Therapy:    15     mins  03857;     Therapeutic Exercise:    27     mins  95617;     Neuromuscular Ruth:        mins  37038;    Therapeutic Activity:          mins  14620;     Gait Training:           mins  07854;     Ultrasound:          mins  29622;    Ionto                                   mins   77029  Self Care                            mins   46779      Un-Timed:  Electrical Stimulation:         mins  90792 ( );  Dry Needling          mins self-pay  Traction          mins 20380  Canalith Repos         mins 89224    Timed Treatment:   42   mins   Total Treatment:     52   mins    Jacqui Desai PT  [place] License: [id number]  Electronically signed by Jacqui Desai PT, 23, 1:48 PM EST.

## 2023-11-20 ENCOUNTER — TREATMENT (OUTPATIENT)
Dept: PHYSICAL THERAPY | Facility: CLINIC | Age: 38
End: 2023-11-20
Payer: OTHER GOVERNMENT

## 2023-11-20 DIAGNOSIS — M53.86 DECREASED ROM OF LUMBAR SPINE: ICD-10-CM

## 2023-11-20 DIAGNOSIS — Z98.890 S/P ARTHROSCOPY OF RIGHT SHOULDER: Primary | ICD-10-CM

## 2023-11-20 DIAGNOSIS — M25.511 CHRONIC RIGHT SHOULDER PAIN: ICD-10-CM

## 2023-11-20 DIAGNOSIS — M25.611 DECREASED ROM OF RIGHT SHOULDER: ICD-10-CM

## 2023-11-20 DIAGNOSIS — M54.41 CHRONIC BILATERAL LOW BACK PAIN WITH RIGHT-SIDED SCIATICA: Primary | ICD-10-CM

## 2023-11-20 DIAGNOSIS — G89.29 CHRONIC RIGHT SHOULDER PAIN: ICD-10-CM

## 2023-11-20 DIAGNOSIS — G89.29 CHRONIC BILATERAL LOW BACK PAIN WITH RIGHT-SIDED SCIATICA: Primary | ICD-10-CM

## 2023-11-20 PROCEDURE — 97110 THERAPEUTIC EXERCISES: CPT | Performed by: PHYSICAL THERAPIST

## 2023-11-20 PROCEDURE — 97140 MANUAL THERAPY 1/> REGIONS: CPT | Performed by: PHYSICAL THERAPIST

## 2023-11-20 NOTE — PROGRESS NOTES
Physical Therapy Daily Treatment Note      Patient: Teo Griffith   : 1985  Referring practitioner: Gregorio Thomas DO  Date of Initial Visit: Type: THERAPY  Noted: 2023  Today's Date: 2023  Patient seen for 13 sessions       Visit Diagnoses:    ICD-10-CM ICD-9-CM   1. Chronic bilateral low back pain with right-sided sciatica  M54.41 724.2    G89.29 724.3     338.29   2. Decreased ROM of lumbar spine  M53.86 724.9       Subjective Evaluation    History of Present Illness    Subjective comment: Patient reports 3/10 pain in his back today.Pain  Current pain rating: 3         Objective   See Exercise, Manual, and Modality Logs for complete treatment.       Assessment & Plan       Assessment  Assessment details: Therapy session consisted of manual therapy, there ex, and cryotherapy.  Manual therapy focused on improving joint mobility and decreasing pain with PA glides and improving soft tissue pliability with STM.  Exercises performed per flow sheet, with cues provided as necessary for correction of form.  He will continue to be progressed per his tolerance and POC.        Timed:         Manual Therapy:    15     mins  38003;     Therapeutic Exercise:    26     mins  90960;     Neuromuscular Ruth:        mins  18099;    Therapeutic Activity:          mins  20433;     Gait Training:           mins  16000;     Ultrasound:          mins  47747;    Ionto                                   mins   46420  Self Care                            mins   72887      Un-Timed:  Electrical Stimulation:         mins  12924 ( );  Dry Needling          mins self-pay  Traction          mins 80383  Canalith Repos         mins 22407  Ice-10 min    Timed Treatment:   41   mins   Total Treatment:     51   mins    Jacqui Desai PT  [place] License: [id number]  Electronically signed by Jacqui Desai PT, 23, 10:43 AM EST.

## 2023-11-20 NOTE — PROGRESS NOTES
"Physical Therapy Daily Treatment Note      Patient: Teo Griffith   : 1985  Referring practitioner: Gregorio Thomas DO  Date of Initial Visit: Type: THERAPY  Noted: 2023  Today's Date: 2023  Patient seen for 39 sessions       Visit Diagnoses:    ICD-10-CM ICD-9-CM   1. S/P arthroscopy of right shoulder  Z98.890 V45.89   2. Decreased ROM of right shoulder  M25.611 719.51   3. Chronic right shoulder pain  M25.511 719.41    G89.29 338.29       Subjective Evaluation    History of Present Illness    Subjective comment: Patient states that he has 4-5/10 pain in the shoulder today.  He mentions that he feels like his shoulder should be further along by now.Pain  Pain scale: \"4-5/10\"         Objective   See Exercise, Manual, and Modality Logs for complete treatment.       Assessment & Plan       Assessment  Assessment details: Treatment session consisted of there ex, therapeutic activities, PROM, and ESTIM/cryotherapy.  Slight increased discomfort was reported with PROM flexion and abduction at end range of motion, but increased discomfort eased with rest.  Patient continues to show show good form and posture while performing exercises and appears well motivated.  He noted decrease in pain to 3/10 post-tx.  Patient will continue to be progressed per his tolerance and POC.      Timed:         Manual Therapy:         mins  33720;     Therapeutic Exercise:    34     mins  74303;     Neuromuscular Ruth:        mins  02517;    Therapeutic Activity:     12    mins  83459;     Gait Training:           mins  88650;     Ultrasound:          mins  20880;    Ionto                                   mins   97389  Self Care                            mins   31233      Un-Timed:  Electrical Stimulation:    10     mins  79895 ( );  Dry Needling          mins self-pay  Traction          mins 39322  Canalith Repos         mins 36846    Timed Treatment:   46   mins   Total Treatment:     56   mins    Jacqui JAIN" AXEL Desai  [place] License: [id number]  Electronically signed by Jacqui Desai PT, 11/20/23, 10:35 AM EST.

## 2023-11-22 ENCOUNTER — TREATMENT (OUTPATIENT)
Dept: PHYSICAL THERAPY | Facility: CLINIC | Age: 38
End: 2023-11-22
Payer: OTHER GOVERNMENT

## 2023-11-22 DIAGNOSIS — Z98.890 S/P ARTHROSCOPY OF RIGHT SHOULDER: Primary | ICD-10-CM

## 2023-11-22 DIAGNOSIS — M54.41 CHRONIC BILATERAL LOW BACK PAIN WITH RIGHT-SIDED SCIATICA: Primary | ICD-10-CM

## 2023-11-22 DIAGNOSIS — M25.511 CHRONIC RIGHT SHOULDER PAIN: ICD-10-CM

## 2023-11-22 DIAGNOSIS — M53.86 DECREASED ROM OF LUMBAR SPINE: ICD-10-CM

## 2023-11-22 DIAGNOSIS — G89.29 CHRONIC BILATERAL LOW BACK PAIN WITH RIGHT-SIDED SCIATICA: Primary | ICD-10-CM

## 2023-11-22 DIAGNOSIS — G89.29 CHRONIC RIGHT SHOULDER PAIN: ICD-10-CM

## 2023-11-22 DIAGNOSIS — M25.611 DECREASED ROM OF RIGHT SHOULDER: ICD-10-CM

## 2023-11-22 PROCEDURE — 97110 THERAPEUTIC EXERCISES: CPT | Performed by: PHYSICAL THERAPIST

## 2023-11-22 PROCEDURE — 97140 MANUAL THERAPY 1/> REGIONS: CPT | Performed by: PHYSICAL THERAPIST

## 2023-11-22 NOTE — PROGRESS NOTES
Physical Therapy Progress Note  Patient: Teo Griffith   : 1985  Diagnosis/ICD-10 Code:  Chronic bilateral low back pain with right-sided sciatica [M54.41, G89.29]  Referring practitioner: Gregorio Thomas DO  Date of Initial Visit: Type: THERAPY  Noted: 2023  Today's Date: 2023  Patient seen for 14 sessions         Visit Diagnoses:    ICD-10-CM ICD-9-CM   1. Chronic bilateral low back pain with right-sided sciatica  M54.41 724.2    G89.29 724.3     338.29   2. Decreased ROM of lumbar spine  M53.86 724.9         Teo Griffith reports: He has 3/10 back pain.  Patient mentions that his back hurts when sitting for long periods of time.    Subjective Questionnaire: Modified Oswestry:   Clinical Progress: improved  Home Program Compliance: Yes      Subjective Evaluation    Pain  Current pain rating: 3  At best pain rating: 3  At worst pain ratin           Objective          Active Range of Motion     Lumbar   Flexion: Active lumbar flexion: 50%   Extension: Active lumbar extension: 25%   Left rotation: Active left lumbar rotation: 75%   Right rotation: Active right lumbar rotation: 75%     Strength/Myotome Testing     Left Hip   Planes of Motion   Flexion: 4+  Abduction: 4+  Adduction: 4    Right Hip   Planes of Motion   Flexion: 4+  Abduction: 4+  Adduction: 4    Left Knee   Flexion: 4+  Extension: 4+    Right Knee   Flexion: 4+  Extension: 4        Assessment & Plan       Assessment  Impairments: abnormal gait, abnormal muscle firing, abnormal or restricted ROM, activity intolerance, impaired physical strength, lacks appropriate home exercise program and pain with function   Functional limitations: lifting, sleeping, pulling, pushing, uncomfortable because of pain, moving in bed, sitting, standing, stooping and unable to perform repetitive tasks   Assessment details: Patient has been attending outpatient physical therapy for low back pain; he has attended therapy for a total of 14  sessions.  Patient reports a 6% improvement in functional mobility, based on his response to the Modified Oswestry; he continues to report a 46% impairment.  Patient has shown some improvements in LE strength.  No significant changes were noted in lumbar ROM.  Patient will continue to benefit from skilled PT so that he can achieve his maximum level of function.  Prognosis: good    Goals  Plan Goals: SHORT TERM GOALS:     4 weeks  1. Patient will be independent/compliant with HEP.  Met  2. Patient will report pain no greater than 3/10 when performing self-care activities.  Met-3/10 with ADLs  3. Patient will report pain no greater than 3/10 when sitting for 1 hour to travel to appointments.  Ongoing, progressing-up to 5/10  4. Patient will be educated in proper lifting mechanics for improved back safety and be able to demonstrate on 4/5 trials.  Met    LONG TERM GOALS:   8 weeks  1. Patient will show a 25% improvement of lumbar AROM to show improved ability to perform functional activities.  Ongoing, progressing  2. Bilateral LE strength will improve to at least 4+/5 to allow for greater ease with daily tasks.  Ongoing, progressing  3. Patient to report less than 30% impairment on the Modified Oswestry for improved functional mobility.  Ongoing-46% impaired  4. Pt to report 50% decrease of pain/numbness/tingling into the right leg to show decreased nerve compression.  Met  5. Patient will be able to lift and carry 20# with proper body mechanics and no increase in back pain to allow for greater ease with daily tasks.  Ongoing, progressing-reports increased hip and back pain      Plan  Therapy options: will be seen for skilled therapy services  Planned modality interventions: cryotherapy, TENS, electrical stimulation/Russian stimulation, thermotherapy (hydrocollator packs), ultrasound and traction  Planned therapy interventions: manual therapy, ADL retraining, balance/weight-bearing training, neuromuscular  re-education, body mechanics training, postural training, soft tissue mobilization, flexibility, spinal/joint mobilization, functional ROM exercises, strengthening, gait training, stretching, home exercise program, therapeutic activities, IADL retraining, wheelchair management/propulsion training and joint mobilization  Frequency: 2x week  Duration in weeks: 4  Treatment plan discussed with: patient  Plan details: Re-evaluation   07269    Therapeutic exercise  18526  Therapeutic activity    44890  Neuromuscular re-education   59852  Manual therapy   67292  Gait training  96339    Unattended e-stim (Private)  21008  Unattended e-stim (Medicaid/Medicare)     Moist heat/cryotherapy 32335   Ultrasound   21441  Mechanical traction 76774         Recommendations: Continue as planned  Timeframe: 1 month  Prognosis to achieve goals: good      Timed:         Manual Therapy:    15     mins  10734;     Therapeutic Exercise:    31     mins  31964;     Neuromuscular Ruth:        mins  08484;    Therapeutic Activity:          mins  50444;     Gait Training:           mins  18861;     Ultrasound:          mins  45068;    Ionto                                   mins   61118  Self Care                            mins   18617    Un-Timed:  Electrical Stimulation:         mins  19484 ( );  Dry Needling          mins self-pay  Traction          mins 63622  Re-Eval                               mins  62173  Canalith Repos         mins 03972  Ice-10 min    Timed Treatment:   46   mins   Total Treatment:     56   mins          PT: Jacqui Desai PT     [place] License:  [id number]    Electronically signed by Jacqui Desai PT, 11/22/23, 9:42 AM EST    Certification Period: 11/22/2023 thru 2/19/2024  I certify that the therapy services are furnished while this patient is under my care.  The services outlined above are required by this patient, and will be reviewed every 90 days.         Physician  Signature:__________________________________________________    PHYSICIAN: Gregorio Thomas DO  NPI: [id number]                                      DATE:  :     Please sign and return via fax to [email or web address] . Thank you, [facility]

## 2023-11-22 NOTE — PROGRESS NOTES
Physical Therapy Re Certification Of Plan of Care  Patient: Teo Griffith   : 1985  Diagnosis/ICD-10 Code:  S/P arthroscopy of right shoulder [Z98.890]  Referring practitioner: Gregorio Thomas DO  Date of Initial Visit: Type: THERAPY  Noted: 2023  Today's Date: 2023  Patient seen for 40 sessions         Visit Diagnoses:    ICD-10-CM ICD-9-CM   1. S/P arthroscopy of right shoulder  Z98.890 V45.89   2. Decreased ROM of right shoulder  M25.611 719.51   3. Chronic right shoulder pain  M25.511 719.41    G89.29 338.29       Subjective Questionnaire: QuickDASH: 40.91% impaired  Clinical Progress: improved  Home Program Compliance: Yes      Subjective Evaluation    History of Present Illness    Subjective comment: Patient reports that he has a follow-up appt pedrito KERR on .  He continues to report right shoulder pain.  Patient mentions that his shoulder feels stiff today.Pain  Current pain rating: 3  At best pain rating: 3  At worst pain ratin           Objective          Active Range of Motion     Right Shoulder   Flexion: 121 degrees   Abduction: 110 degrees   External rotation 90°: 42 degrees  Internal rotation 90°: 57 degrees     Strength/Myotome Testing     Right Shoulder     Planes of Motion   Flexion: 4-   Abduction: 4-   External rotation at 0°: 4-   Internal rotation at 0°: 4           Assessment & Plan       Assessment  Impairments: abnormal muscle firing, abnormal or restricted ROM, activity intolerance, impaired physical strength, lacks appropriate home exercise program, pain with function and safety issue   Functional limitations: carrying objects, lifting, sleeping, pulling, pushing, uncomfortable because of pain, stooping, reaching behind back and reaching overhead   Assessment details: Patient has been attending therapy for treatment s/p a right shoulder arthroscopy; he has attended therapy for a total of 40 sessions.  Patient continues to report elevated pain levels, noting 6/10  pain at worst.  Patient has shown improvement in right shoulder strength, but does continue to have greatest weakness with flexion, abduction, and ER.  Patient will continue to benefit from skilled PT so that he can achieve his maximum level of function.  Prognosis: good    Goals  Plan Goals: STG 6 weeks  1 Pt will be instructed in a HEP.  Met  2 Pt will report a 50% decrease in radicular symptoms.  Ongoing, progressing-reports 0% improvement  3 Pt will demonstrate 120 degree of arom shoulder flexion.  Met  Pt will improve his Quick Dash to less than 40%.   Met    LTG 12 weeks  1. Pt will report back pain no greater than 3/10 with moderate lifting.  Ongoing, zdhhavnpxam-1-5/10 pain with moderate lifting  2. Pt will demonstrate 4/5 right shoulder gross strength.  Ongoing, progressing  3. Pt will improve his Quick Dash score to less than 15%.  Ongoing, progress  4. Pt will demonstrate 140 degrees of AROM flexion with no distress.  Met      Plan  Therapy options: will be seen for skilled therapy services  Planned modality interventions: cryotherapy, TENS, thermotherapy (hydrocollator packs), traction and ultrasound  Planned therapy interventions: abdominal trunk stabilization, ADL retraining, body mechanics training, flexibility, functional ROM exercises, home exercise program, IADL retraining, joint mobilization, manual therapy, neuromuscular re-education, postural training, soft tissue mobilization, strengthening, stretching and therapeutic activities  Frequency: 2x week  Duration in weeks: 8  Treatment plan discussed with: patient  Plan details: Re-evaluation   58681  Therapeutic exercise  88527  Therapeutic activity    10494  Neuromuscular re-education   45352  Manual therapy   85501  Gait training  35674    Unattended e-stim (Medicaid/Medicare)     Moist heat/cryotherapy 18845   Ultrasound   15088  Mechanical traction 02013         Recommendations: Continue as planned  Timeframe: 2 months  Prognosis to  achieve goals: good      Timed:         Manual Therapy:         mins  95683;     Therapeutic Exercise:    35     mins  30419;     Neuromuscular Ruth:        mins  01333;    Therapeutic Activity:     12     mins  35639;     Gait Training:           mins  54331;     Ultrasound:          mins  38064;    Ionto                                   mins   45492  Self Care                           mins   66607    Un-Timed:  Electrical Stimulation:    10     mins  93986 ( );  Dry Needling          mins self-pay  Traction          mins 84953  Re-Eval                               mins  87599  Canalith Repos         mins 51956    Timed Treatment:   47   mins   Total Treatment:     57   mins          PT: Jacqui Deasi PT     [place] License:  [id number]    Electronically signed by Jacqui Desai PT, 11/22/23, 9:10 AM EST    Certification Period: 11/22/2023 thru 2/19/2024  I certify that the therapy services are furnished while this patient is under my care.  The services outlined above are required by this patient, and will be reviewed every 90 days.         Physician Signature:__________________________________________________    PHYSICIAN: Gregorio Thomas DO  NPI: [id number]                                      DATE:  :     Please sign and return via fax to [email or web address] . Thank you, [facility]

## 2023-11-27 ENCOUNTER — TREATMENT (OUTPATIENT)
Dept: PHYSICAL THERAPY | Facility: CLINIC | Age: 38
End: 2023-11-27
Payer: OTHER GOVERNMENT

## 2023-11-27 DIAGNOSIS — M54.41 CHRONIC BILATERAL LOW BACK PAIN WITH RIGHT-SIDED SCIATICA: Primary | ICD-10-CM

## 2023-11-27 DIAGNOSIS — Z98.890 S/P ARTHROSCOPY OF RIGHT SHOULDER: Primary | ICD-10-CM

## 2023-11-27 DIAGNOSIS — M25.511 CHRONIC RIGHT SHOULDER PAIN: ICD-10-CM

## 2023-11-27 DIAGNOSIS — M25.611 DECREASED ROM OF RIGHT SHOULDER: ICD-10-CM

## 2023-11-27 DIAGNOSIS — M53.86 DECREASED ROM OF LUMBAR SPINE: ICD-10-CM

## 2023-11-27 DIAGNOSIS — G89.29 CHRONIC BILATERAL LOW BACK PAIN WITH RIGHT-SIDED SCIATICA: Primary | ICD-10-CM

## 2023-11-27 DIAGNOSIS — G89.29 CHRONIC RIGHT SHOULDER PAIN: ICD-10-CM

## 2023-11-27 PROCEDURE — G0283 ELEC STIM OTHER THAN WOUND: HCPCS | Performed by: PHYSICAL THERAPIST

## 2023-11-27 PROCEDURE — 97140 MANUAL THERAPY 1/> REGIONS: CPT | Performed by: PHYSICAL THERAPIST

## 2023-11-27 PROCEDURE — 97110 THERAPEUTIC EXERCISES: CPT | Performed by: PHYSICAL THERAPIST

## 2023-11-27 PROCEDURE — 97530 THERAPEUTIC ACTIVITIES: CPT | Performed by: PHYSICAL THERAPIST

## 2023-11-27 NOTE — PROGRESS NOTES
Physical Therapy Daily Treatment Note      Patient: Teo Griffith   : 1985  Referring practitioner: Gregorio Thomas DO  Date of Initial Visit: Type: THERAPY  Noted: 2023  Today's Date: 2023  Patient seen for 41 sessions       Visit Diagnoses:    ICD-10-CM ICD-9-CM   1. S/P arthroscopy of right shoulder  Z98.890 V45.89   2. Decreased ROM of right shoulder  M25.611 719.51   3. Chronic right shoulder pain  M25.511 719.41    G89.29 338.29       Subjective Evaluation    History of Present Illness    Subjective comment: Patient reports 4/10 pain today.  He notes that his shoulder usually feels like it loosens up some as the day goes on, but is more painful first thing in the morning.Pain  Current pain ratin         Objective   See Exercise, Manual, and Modality Logs for complete treatment.       Assessment & Plan       Assessment  Assessment details: Treatment session initiated with PROM to the right shoulder, followed by there ex and therapeutic activities.  Therapy session concluded with cryotherapy and ESTIM.  Exercises continue to address shoulder ROM, UE strength, scapular stabilization, and functional mobility.  Patient reported 3/10 pain post-tx.  Patient will continue to be progressed per his tolerance and POC.      Timed:         Manual Therapy:         mins  22102;     Therapeutic Exercise:    32    mins  88221;     Neuromuscular Ruth:        mins  77039;    Therapeutic Activity:     13     mins  37220;     Gait Training:           mins  48883;     Ultrasound:          mins  01861;    Ionto                                   mins   78003  Self Care                            mins   61971      Un-Timed:  Electrical Stimulation:    10     mins  36196 ( );  Dry Needling          mins self-pay  Traction          mins 21865  Canalith Repos         mins 73439    Timed Treatment:   45   mins   Total Treatment:      55  mins    Jacqui Desai, PT  [place] License: [id number]  Electronically  signed by Jacqui Desai, PT, 11/27/23, 11:52 AM EST.

## 2023-11-27 NOTE — PROGRESS NOTES
Physical Therapy Daily Treatment Note      Patient: Teo Griffith   : 1985  Referring practitioner: Alexis Sanchez MD  Date of Initial Visit: Type: THERAPY  Noted: 2023  Today's Date: 2023  Patient seen for 15 sessions       Visit Diagnoses:    ICD-10-CM ICD-9-CM   1. Chronic bilateral low back pain with right-sided sciatica  M54.41 724.2    G89.29 724.3     338.29   2. Decreased ROM of lumbar spine  M53.86 724.9       Subjective Evaluation    History of Present Illness    Subjective comment: Patient reports 3/10 pain for his back, noting it feels a little stiff today.Pain  Current pain rating: 3         Objective   See Exercise, Manual, and Modality Logs for complete treatment.       Assessment & Plan       Assessment  Assessment details: Therapy session initiated with manual therapy to the lumbar region, consisting of STM and PA glides.  Following manual therapy, patient performed there ex per flow sheet.  Exercises focused on lumbar extension preference, LE strengthening, and core strengthening.  Treatment concluded with cryotherapy.  He will continue to be progressed per his tolerance and POC.      Timed:         Manual Therapy:    12     mins  18174;     Therapeutic Exercise:    29     mins  81687;     Neuromuscular Ruth:        mins  04269;    Therapeutic Activity:          mins  95945;     Gait Training:           mins  59020;     Ultrasound:          mins  30566;    Ionto                                   mins   02944  Self Care                            mins   38091      Un-Timed:  Electrical Stimulation:         mins  52727 ( );  Dry Needling          mins self-pay  Traction          mins 96062  Canalith Repos         mins 52610  Ice-10 min    Timed Treatment:    41  mins   Total Treatment:      51  mins    Jacqui Desai PT  [place] License: [id number]  Electronically signed by Jacqui Desai PT, 23, 12:48 PM EST.

## 2023-11-29 ENCOUNTER — TREATMENT (OUTPATIENT)
Dept: PHYSICAL THERAPY | Facility: CLINIC | Age: 38
End: 2023-11-29
Payer: OTHER GOVERNMENT

## 2023-11-29 DIAGNOSIS — M25.611 DECREASED ROM OF RIGHT SHOULDER: ICD-10-CM

## 2023-11-29 DIAGNOSIS — M54.41 CHRONIC BILATERAL LOW BACK PAIN WITH RIGHT-SIDED SCIATICA: Primary | ICD-10-CM

## 2023-11-29 DIAGNOSIS — G89.29 CHRONIC BILATERAL LOW BACK PAIN WITH RIGHT-SIDED SCIATICA: Primary | ICD-10-CM

## 2023-11-29 DIAGNOSIS — G89.29 CHRONIC RIGHT SHOULDER PAIN: ICD-10-CM

## 2023-11-29 DIAGNOSIS — Z98.890 S/P ARTHROSCOPY OF RIGHT SHOULDER: Primary | ICD-10-CM

## 2023-11-29 DIAGNOSIS — M25.511 CHRONIC RIGHT SHOULDER PAIN: ICD-10-CM

## 2023-11-29 DIAGNOSIS — M53.86 DECREASED ROM OF LUMBAR SPINE: ICD-10-CM

## 2023-11-29 PROCEDURE — 97140 MANUAL THERAPY 1/> REGIONS: CPT | Performed by: PHYSICAL THERAPIST

## 2023-11-29 PROCEDURE — 97110 THERAPEUTIC EXERCISES: CPT | Performed by: PHYSICAL THERAPIST

## 2023-11-29 NOTE — PROGRESS NOTES
Physical Therapy Daily Treatment Note      Patient: Teo Griffith   : 1985  Referring practitioner: Alexis Sanchez MD  Date of Initial Visit: Type: THERAPY  Noted: 2023  Today's Date: 2023  Patient seen for 16 sessions       Visit Diagnoses:    ICD-10-CM ICD-9-CM   1. Chronic bilateral low back pain with right-sided sciatica  M54.41 724.2    G89.29 724.3     338.29   2. Decreased ROM of lumbar spine  M53.86 724.9       Subjective Evaluation    History of Present Illness    Subjective comment: Patient arrives to therapy with no new complaints.  He reports 4/10 pain today.Pain  Current pain ratin         Objective   See Exercise, Manual, and Modality Logs for complete treatment.       Assessment & Plan       Assessment  Assessment details: Therapy session consisted of manual therapy and there ex, followed by cryotherapy.  STM and PA glides performed to lumbar region, with focus on improving mobility and decreasing pain.  There ex progressed to include the addition of side stepping with red theraband.  Patient noted 3/10 pain post-tx.  He will continue to be progressed per his tolerance and POC.      Timed:         Manual Therapy:    15     mins  60951;     Therapeutic Exercise:      30   mins  96665;     Neuromuscular Ruth:        mins  58193;    Therapeutic Activity:          mins  02198;     Gait Training:           mins  17175;     Ultrasound:          mins  02850;    Ionto                                   mins   13540  Self Care                            mins   73274      Un-Timed:  Electrical Stimulation:         mins  47803 ( );  Dry Needling          mins self-pay  Traction          mins 61120  Canalith Repos         mins 37920  Ice-10 min    Timed Treatment:   45   mins   Total Treatment:     55   mins    Jacqui Desai PT  [place] License: [id number]  Electronically signed by Jacqui Desai PT, 23, 11:43 AM EST.

## 2023-11-29 NOTE — PROGRESS NOTES
Physical Therapy Daily Treatment Note      Patient: Teo Griffith   : 1985  Referring practitioner: Gregorio Thomas DO  Date of Initial Visit: Type: THERAPY  Noted: 2023  Today's Date: 2023  Patient seen for 42 sessions       Visit Diagnoses:    ICD-10-CM ICD-9-CM   1. S/P arthroscopy of right shoulder  Z98.890 V45.89   2. Decreased ROM of right shoulder  M25.611 719.51   3. Chronic right shoulder pain  M25.511 719.41    G89.29 338.29       Subjective Evaluation    History of Present Illness    Subjective comment: Patient reports 4/10 pain today.  He mentions that his incision has opened back up again, so he plans to discuss this with MD at next appt.Pain  Current pain ratin         Objective   See Exercise, Manual, and Modality Logs for complete treatment.       Assessment & Plan       Assessment  Assessment details: Treatment consisted of there ex, therapeutic activities, ice, and ESTIM.  No adverse reactions were noted with modalities post-tx.  Exercise progressed to include the addition of shoulder ABC with ball on wall.  Patient displayed good form and posture with exercises, and he required minimal cues for correction.  Patient noted 3/10 pain post-tx.  Patient will continue to be progressed per his tolerance and POC.      Timed:         Manual Therapy:         mins  59768;     Therapeutic Exercise:    33     mins  76884;     Neuromuscular Ruth:        mins  57275;    Therapeutic Activity:     11    mins  44292;     Gait Training:           mins  68950;     Ultrasound:          mins  04813;    Ionto                                   mins   69904  Self Care                            mins   20547      Un-Timed:  Electrical Stimulation:    10     mins  86580 ( );  Dry Needling          mins self-pay  Traction          mins 93235  Canalith Repos         mins 66401    Timed Treatment:   44   mins   Total Treatment:     54   mins    Jacqui Desai, PT  [place] License:  [id number]  Electronically signed by Jacqui Desai, PT, 11/29/23, 10:51 AM EST.

## 2023-12-04 ENCOUNTER — TREATMENT (OUTPATIENT)
Dept: PHYSICAL THERAPY | Facility: CLINIC | Age: 38
End: 2023-12-04
Payer: OTHER GOVERNMENT

## 2023-12-04 DIAGNOSIS — M54.41 CHRONIC BILATERAL LOW BACK PAIN WITH RIGHT-SIDED SCIATICA: Primary | ICD-10-CM

## 2023-12-04 DIAGNOSIS — M25.511 CHRONIC RIGHT SHOULDER PAIN: ICD-10-CM

## 2023-12-04 DIAGNOSIS — G89.29 CHRONIC RIGHT SHOULDER PAIN: ICD-10-CM

## 2023-12-04 DIAGNOSIS — M25.611 DECREASED ROM OF RIGHT SHOULDER: ICD-10-CM

## 2023-12-04 DIAGNOSIS — M53.86 DECREASED ROM OF LUMBAR SPINE: ICD-10-CM

## 2023-12-04 DIAGNOSIS — Z98.890 S/P ARTHROSCOPY OF RIGHT SHOULDER: Primary | ICD-10-CM

## 2023-12-04 DIAGNOSIS — G89.29 CHRONIC BILATERAL LOW BACK PAIN WITH RIGHT-SIDED SCIATICA: Primary | ICD-10-CM

## 2023-12-04 PROCEDURE — 97140 MANUAL THERAPY 1/> REGIONS: CPT | Performed by: PHYSICAL THERAPIST

## 2023-12-04 PROCEDURE — 97110 THERAPEUTIC EXERCISES: CPT | Performed by: PHYSICAL THERAPIST

## 2023-12-04 NOTE — PROGRESS NOTES
Physical Therapy Daily Treatment Note      Patient: Teo Griffith   : 1985  Referring practitioner: Gregorio Thomas DO  Date of Initial Visit: Type: THERAPY  Noted: 2023  Today's Date: 2023  Patient seen for 43 sessions       Visit Diagnoses:    ICD-10-CM ICD-9-CM   1. S/P arthroscopy of right shoulder  Z98.890 V45.89   2. Decreased ROM of right shoulder  M25.611 719.51   3. Chronic right shoulder pain  M25.511 719.41    G89.29 338.29       Subjective Evaluation    History of Present Illness    Subjective comment: Patient reports 4/10 pain, mentioning that he had trouble sleeping.Pain  Current pain ratin         Objective   See Exercise, Manual, and Modality Logs for complete treatment.       Assessment & Plan       Assessment  Assessment details: Treatment session initiated with there ex and therapeutic activities, per flow sheet.  PROM performed per patient's tolerance, with discomfort reported at end ROM with scaption and ER.  Treatment concluded with ice and ESTIM.  No adverse reactions were noted with modalities post-tx.  He noted no change in pain post-tx.  Patient will continue to be progressed per his tolerance and POC.      Timed:         Manual Therapy:         mins  21968;     Therapeutic Exercise:    30     mins  16329;     Neuromuscular Ruth:        mins  45600;    Therapeutic Activity:     12     mins  47438;     Gait Training:           mins  89190;     Ultrasound:          mins  62503;    Ionto                                   mins   44571  Self Care                            mins   84101      Un-Timed:  Electrical Stimulation:    10     mins  60955 (MC );  Dry Needling          mins self-pay  Traction          mins 23253  Canalith Repos         mins 89800    Timed Treatment:   52   mins   Total Treatment:     52   mins    Jacqui Desai PT  [place] License: [id number]  Electronically signed by Jacqui Desai PT, 23, 11:04 AM EST.

## 2023-12-04 NOTE — PROGRESS NOTES
Physical Therapy Daily Treatment Note      Patient: Teo Griffith   : 1985  Referring practitioner: Alexis Sanchez MD  Date of Initial Visit: Type: THERAPY  Noted: 2023  Today's Date: 2023  Patient seen for 17 sessions       Visit Diagnoses:    ICD-10-CM ICD-9-CM   1. Chronic bilateral low back pain with right-sided sciatica  M54.41 724.2    G89.29 724.3     338.29   2. Decreased ROM of lumbar spine  M53.86 724.9       Subjective Evaluation    History of Present Illness    Subjective comment: Patient reports 4/10 pain today in his back.Pain  Current pain ratin         Objective   See Exercise, Manual, and Modality Logs for complete treatment.       Assessment & Plan       Assessment  Assessment details: Treatment session initiated with manual therapy to the lumbar region, with muscle guarding noted at bilateral lumbar paraspinals.  Patient also noted tenderness at lumbar musculature.  There ex performed, with exercises progressed to include increased repetition and theraband resistance.  Patient gave good effort throughout session.  Tim Jackson, PT, assisted in concluding today's session.  Patient noted no change in pain post-tx.  He will continue to be progressed per his tolerance and POC.      Timed:         Manual Therapy:    15     mins  84097;     Therapeutic Exercise:    32     mins  39370;     Neuromuscular Ruth:        mins  92465;    Therapeutic Activity:          mins  68346;     Gait Training:           mins  30173;     Ultrasound:          mins  32865;    Ionto                                  mins   07615  Self Care                            mins   10538      Un-Timed:  Electrical Stimulation:         mins  47218 ( );  Dry Needling          mins self-pay  Traction          mins 33650  Canalith Repos         mins 25890  Ice-10 min    Timed Treatment:   47   mins   Total Treatment:     57   mins    Jacqui Desai, PT  [place] License: [id number]  Electronically signed by  Jacqui Desai, PT, 12/04/23, 10:39 AM EST.

## 2023-12-13 ENCOUNTER — TREATMENT (OUTPATIENT)
Dept: PHYSICAL THERAPY | Facility: CLINIC | Age: 38
End: 2023-12-13
Payer: OTHER GOVERNMENT

## 2023-12-13 DIAGNOSIS — G89.29 CHRONIC RIGHT SHOULDER PAIN: ICD-10-CM

## 2023-12-13 DIAGNOSIS — M53.86 DECREASED ROM OF LUMBAR SPINE: ICD-10-CM

## 2023-12-13 DIAGNOSIS — M54.41 CHRONIC BILATERAL LOW BACK PAIN WITH RIGHT-SIDED SCIATICA: Primary | ICD-10-CM

## 2023-12-13 DIAGNOSIS — M25.511 CHRONIC RIGHT SHOULDER PAIN: ICD-10-CM

## 2023-12-13 DIAGNOSIS — G89.29 CHRONIC BILATERAL LOW BACK PAIN WITH RIGHT-SIDED SCIATICA: Primary | ICD-10-CM

## 2023-12-13 DIAGNOSIS — M25.611 DECREASED ROM OF RIGHT SHOULDER: ICD-10-CM

## 2023-12-13 DIAGNOSIS — Z98.890 S/P ARTHROSCOPY OF RIGHT SHOULDER: Primary | ICD-10-CM

## 2023-12-13 PROCEDURE — 97140 MANUAL THERAPY 1/> REGIONS: CPT | Performed by: PHYSICAL THERAPIST

## 2023-12-13 PROCEDURE — 97110 THERAPEUTIC EXERCISES: CPT | Performed by: PHYSICAL THERAPIST

## 2023-12-13 NOTE — PROGRESS NOTES
Physical Therapy Daily Treatment Note      Patient: Teo Griffith   : 1985  Referring practitioner: Alexis Sanchez MD  Date of Initial Visit: Type: THERAPY  Noted: 2023  Today's Date: 2023  Patient seen for 18 sessions       Visit Diagnoses:    ICD-10-CM ICD-9-CM   1. Chronic bilateral low back pain with right-sided sciatica  M54.41 724.2    G89.29 724.3     338.29   2. Decreased ROM of lumbar spine  M53.86 724.9       Subjective Evaluation    History of Present Illness    Subjective comment: Patient reports that his back is sore today.Pain  Current pain rating: 3         Objective   See Exercise, Manual, and Modality Logs for complete treatment.       Assessment & Plan       Assessment  Assessment details: Treatment session consisted of manual therapy, there ex, and cryotherapy.  There ex  continues to address lumbar extension preference, postural awareness, core strengthening, and LE strengthening.  STM and PA glides performed to lumbar region; muscle guarding noted at bilateral lumbar paraspinals.  Patient will continue to be progressed per his tolerance and POC.      Timed:         Manual Therapy:    15     mins  27750;     Therapeutic Exercise:    29     mins  42766;     Neuromuscular Ruth:        mins  72078;    Therapeutic Activity:          mins  73000;     Gait Training:           mins  94896;     Ultrasound:          mins  51892;    Ionto                                   mins   47716  Self Care                            mins   80261      Un-Timed:  Electrical Stimulation:         mins  69266 ( );  Dry Needling          mins self-pay  Traction          mins 00470  Canalith Repos         mins 15012  Ice-10 min    Timed Treatment:   44   mins   Total Treatment:     54   mins    Jacqui Desai PT  [place] License: [id number]  Electronically signed by Jacqui Desai PT, 23, 2:45 PM EST.

## 2023-12-13 NOTE — PROGRESS NOTES
Physical Therapy Daily Treatment Note      Patient: Teo Griffith   : 1985  Referring practitioner: Gregorio Thomas DO  Date of Initial Visit: Type: THERAPY  Noted: 2023  Today's Date: 2023  Patient seen for 44 sessions       Visit Diagnoses:    ICD-10-CM ICD-9-CM   1. S/P arthroscopy of right shoulder  Z98.890 V45.89   2. Decreased ROM of right shoulder  M25.611 719.51   3. Chronic right shoulder pain  M25.511 719.41    G89.29 338.29       Subjective Evaluation    History of Present Illness    Subjective comment: Patient reports 4/10 pain today.  He notes that he saw MD and was instructed to continue with PTPain  Current pain ratin           Objective   See Exercise, Manual, and Modality Logs for complete treatment.       Assessment & Plan       Assessment  Assessment details: Patient tolerated today's session well, noting no increase in pain post-tx.  Exercises progressed to include the addition of weight tower with scapular stabilization exercises.  Patient reported increased muscle fatigue with addition of weight tower.  Patient will continue to be progressed per his tolerance and POC.          Timed:         Manual Therapy:         mins  12622;     Therapeutic Exercise:    31     mins  00108;     Neuromuscular Ruth:        mins  99371;    Therapeutic Activity:     12     mins  14792;     Gait Training:           mins  76926;     Ultrasound:          mins  12459;    Ionto                                   mins   95723  Self Care                           mins   36069      Un-Timed:  Electrical Stimulation:    10     mins  14613 ( );  Dry Needling          mins self-pay  Traction         mins 77409  Canalith Repos         mins 86921    Timed Treatment:   43   mins   Total Treatment:     53   mins    Jacqui Desai PT  [place] License: [id number]  Electronically signed by Jacqui Desai, AXEL, 23, 1:36 PM EST.

## 2023-12-20 ENCOUNTER — TREATMENT (OUTPATIENT)
Dept: PHYSICAL THERAPY | Facility: CLINIC | Age: 38
End: 2023-12-20
Payer: OTHER GOVERNMENT

## 2023-12-20 DIAGNOSIS — M25.611 DECREASED ROM OF RIGHT SHOULDER: ICD-10-CM

## 2023-12-20 DIAGNOSIS — M53.86 DECREASED ROM OF LUMBAR SPINE: ICD-10-CM

## 2023-12-20 DIAGNOSIS — M54.41 CHRONIC BILATERAL LOW BACK PAIN WITH RIGHT-SIDED SCIATICA: Primary | ICD-10-CM

## 2023-12-20 DIAGNOSIS — G89.29 CHRONIC BILATERAL LOW BACK PAIN WITH RIGHT-SIDED SCIATICA: Primary | ICD-10-CM

## 2023-12-20 DIAGNOSIS — G89.29 CHRONIC RIGHT SHOULDER PAIN: ICD-10-CM

## 2023-12-20 DIAGNOSIS — Z98.890 S/P ARTHROSCOPY OF RIGHT SHOULDER: Primary | ICD-10-CM

## 2023-12-20 DIAGNOSIS — M25.511 CHRONIC RIGHT SHOULDER PAIN: ICD-10-CM

## 2023-12-20 PROCEDURE — 97140 MANUAL THERAPY 1/> REGIONS: CPT | Performed by: PHYSICAL THERAPIST

## 2023-12-20 PROCEDURE — 97110 THERAPEUTIC EXERCISES: CPT | Performed by: PHYSICAL THERAPIST

## 2023-12-20 NOTE — PROGRESS NOTES
Physical Therapy Daily Treatment Note      Patient: Teo Griffith   : 1985  Referring practitioner: Alexis Sanchez MD  Date of Initial Visit: Type: THERAPY  Noted: 2023  Today's Date: 2023  Patient seen for 19 sessions       Visit Diagnoses:    ICD-10-CM ICD-9-CM   1. Chronic bilateral low back pain with right-sided sciatica  M54.41 724.2    G89.29 724.3     338.29   2. Decreased ROM of lumbar spine  M53.86 724.9       Subjective Evaluation    History of Present Illness    Subjective comment: Patient reports 4/10 back pain today, noting that his back feels stiff.Pain  Current pain ratin         Objective   See Exercise, Manual, and Modality Logs for complete treatment.       Assessment & Plan       Assessment  Assessment details: Treatment session initiated with manual therapy, followed by there ex and cryotherapy. Patient tolerated STM and PA glides well at lumbar region, with focus on improving mobility and decreasing pain.  Patient displayed good effort with there ex and required minimal cues for correction of form.  He reported no change in pain at conclusion of session.  Patient will continue to be progressed per his tolerance and POC.      Timed:         Manual Therapy:    15     mins  08776;     Therapeutic Exercise:    30     mins  40182;     Neuromuscular Ruth:        mins  56158;    Therapeutic Activity:          mins  91174;     Gait Training:           mins  10214;     Ultrasound:          mins  57614;    Ionto                                   mins   06046  Self Care                            mins   47466      Un-Timed:  Electrical Stimulation:         mins  49935 ( );  Dry Needling          mins self-pay  Traction          mins 34239  Canalith Repos         mins 75981  Ice-10 min    Timed Treatment:   45   mins   Total Treatment:     55   mins    Jacqui Desai PT  [place] License: [id number]  Electronically signed by Jacqui Desai, AXEL, 23, 1:58 PM  EST.

## 2023-12-20 NOTE — PROGRESS NOTES
Physical Therapy Daily Treatment Note      Patient: Teo Griffith   : 1985  Referring practitioner: Gregorio Thomas DO  Date of Initial Visit: Type: THERAPY  Noted: 2023  Today's Date: 2023  Patient seen for 45 sessions       Visit Diagnoses:    ICD-10-CM ICD-9-CM   1. S/P arthroscopy of right shoulder  Z98.890 V45.89   2. Decreased ROM of right shoulder  M25.611 719.51   3. Chronic right shoulder pain  M25.511 719.41    G89.29 338.29       Subjective Evaluation    History of Present Illness    Subjective comment: Patient notes that his shoulder is a little sore today, reporting 4/10 pain.Pain  Current pain ratin           Objective   See Exercise, Manual, and Modality Logs for complete treatment.       Assessment & Plan       Assessment  Assessment details: Therapy session consisted of there ex, therapeutic activities, and cryotherapy/ESTIM.  No adverse reactions were noted with modalities.  Exercise progressed to include increased repetitions at weight tower with scapular stabilization exercises.  While performing lawnmowers and sternal lifts, patient requested to decrease weight from 4# to 3# due to soreness/discomfort.  He noted no change in pain at conclusion of session.  Patient will continue to be progressed per his tolerance and POC.          Timed:         Manual Therapy:         mins  01941;     Therapeutic Exercise:    33     mins  28249;     Neuromuscular Ruth:        mins  09466;    Therapeutic Activity:     11     mins  25645;     Gait Training:           mins  75904;     Ultrasound:          mins  00073;    Ionto                                   mins   92482  Self Care                            mins   62890      Un-Timed:  Electrical Stimulation:    10     mins  87124 ( );  Dry Needling          mins self-pay  Traction          mins 96701  Canalith Repos         mins 86046    Timed Treatment:  44    mins   Total Treatment:      54  mins    Jacqui Desai, PT  [place]  License: [id number]  Electronically signed by Jacqui Desai, PT, 12/20/23, 1:53 PM EST.

## 2023-12-27 ENCOUNTER — TREATMENT (OUTPATIENT)
Dept: PHYSICAL THERAPY | Facility: CLINIC | Age: 38
End: 2023-12-27
Payer: OTHER GOVERNMENT

## 2023-12-27 DIAGNOSIS — Z98.890 S/P ARTHROSCOPY OF RIGHT SHOULDER: Primary | ICD-10-CM

## 2023-12-27 DIAGNOSIS — M25.611 DECREASED ROM OF RIGHT SHOULDER: ICD-10-CM

## 2023-12-27 NOTE — PROGRESS NOTES
"3Physical Therapy Daily Treatment Note      Patient: Teo Griffith   : 1985  Referring practitioner: Gregorio Thomas DO  Date of Initial Visit: Type: THERAPY  Noted: 2023  Today's Date: 2023  Patient seen for 46 sessions       Visit Diagnoses:    ICD-10-CM ICD-9-CM   1. S/P arthroscopy of right shoulder  Z98.890 V45.89   2. Decreased ROM of right shoulder  M25.611 719.51       Subjective Evaluation    History of Present Illness    Subjective comment: Patient reports that he injured his shoulder last week, noting that he was lifting and pushing a bag when he felt a \"pop\" in his shoulder.  He states that his shoulder has been sore ever since, noting soreness in the front and back of his shoulder.Pain  Current pain ratin         Objective   See Exercise, Manual, and Modality Logs for complete treatment.       Assessment & Plan       Assessment  Assessment details: Patient assessed prior to treatment session, due to patient's subjective reports.  During special tests for rotator cuff and labrum pathology, patient reported generalized pain throughout the right shoulder.  No significant changes were noted with PROM, but patient displayed increased muscle guarding.  Tenderness reported at anterior/posterior shoulder and right UT, but no bruising noted.  Patient educated to continue to monitor symptoms and seek medical treatment if symptoms continue or worsen.  Exercises reduced at today's session, due to increased pain.  Weight was also reduced at today's session with exercises.  STM performed to right UT, with muscle guarding noted and tenderness reported.  Session concluded with ice/ESTIM, with no adverse reactions.  He reported no change in pain post-tx.  He will continue to be progressed per his tolerance and POC.      Timed:         Manual Therapy:    13     mins  45215;     Therapeutic Exercise:    19     mins  70472;     Neuromuscular Ruth:        mins  21282;    Therapeutic Activity:     11  "    mins  93207;     Gait Training:           mins  69930;     Ultrasound:          mins  36084;    Ionto                                   mins   62246  Self Care                            mins   78747      Un-Timed:  Electrical Stimulation:     10    mins  61264 ( );  Dry Needling          mins self-pay  Traction          mins 81015  Canalith Repos         mins 68763    Timed Treatment:   43   mins   Total Treatment:     53   mins    Jacqui Desai, PT  [place] License: [id number]  Electronically signed by Jacqui Desai PT, 12/27/23, 12:50 PM EST.

## 2024-01-03 ENCOUNTER — TREATMENT (OUTPATIENT)
Dept: PHYSICAL THERAPY | Facility: CLINIC | Age: 39
End: 2024-01-03
Payer: OTHER GOVERNMENT

## 2024-01-03 DIAGNOSIS — M25.611 DECREASED ROM OF RIGHT SHOULDER: ICD-10-CM

## 2024-01-03 DIAGNOSIS — Z98.890 S/P ARTHROSCOPY OF RIGHT SHOULDER: Primary | ICD-10-CM

## 2024-01-03 DIAGNOSIS — G89.29 CHRONIC BILATERAL LOW BACK PAIN WITH RIGHT-SIDED SCIATICA: Primary | ICD-10-CM

## 2024-01-03 DIAGNOSIS — M54.41 CHRONIC BILATERAL LOW BACK PAIN WITH RIGHT-SIDED SCIATICA: Primary | ICD-10-CM

## 2024-01-03 DIAGNOSIS — M53.86 DECREASED ROM OF LUMBAR SPINE: ICD-10-CM

## 2024-01-03 PROCEDURE — 97140 MANUAL THERAPY 1/> REGIONS: CPT | Performed by: PHYSICAL THERAPIST

## 2024-01-03 PROCEDURE — 97110 THERAPEUTIC EXERCISES: CPT | Performed by: PHYSICAL THERAPIST

## 2024-01-03 NOTE — PROGRESS NOTES
Physical Therapy Re Certification Of Plan of Care  Patient: Teo Griffith   : 1985  Diagnosis/ICD-10 Code:  Chronic bilateral low back pain with right-sided sciatica [M54.41, G89.29]  Referring practitioner: Alexis Sanchez MD  Date of Initial Visit: Type: THERAPY  Noted: 2023  Today's Date: 1/3/2024  Patient seen for 20 sessions         Visit Diagnoses:    ICD-10-CM ICD-9-CM   1. Chronic bilateral low back pain with right-sided sciatica  M54.41 724.2    G89.29 724.3     338.29   2. Decreased ROM of lumbar spine  M53.86 724.9         Teo Griffith reports: He will be going for injections in his back on 1/10/2024.  He notes that his back has been feeling a little stiffer recently and that his right leg has been bothering him.    Subjective Questionnaire: Oswestry: 21/50=42% impaired  Clinical Progress: worse  Home Program Compliance: Yes        Subjective Evaluation    Pain  Current pain rating: 3  At best pain rating: 3  At worst pain ratin           Objective          Active Range of Motion     Lumbar   Flexion: Active lumbar flexion: 50%   Extension: Active lumbar extension: 25%   Left rotation: Active left lumbar rotation: 75%   Right rotation: Active right lumbar rotation: 50%     Strength/Myotome Testing     Left Hip   Planes of Motion   Flexion: 4+  Abduction: 4+  Adduction: 4    Right Hip   Planes of Motion   Flexion: 4+  Abduction: 4+  Adduction: 4    Left Knee   Flexion: 4+  Extension: 4+    Right Knee   Flexion: 4  Extension: 4        Assessment & Plan       Assessment  Impairments: abnormal gait, abnormal muscle firing, abnormal or restricted ROM, activity intolerance, impaired physical strength, lacks appropriate home exercise program and pain with function   Functional limitations: lifting, sleeping, pulling, pushing, uncomfortable because of pain, moving in bed, sitting, standing, stooping and unable to perform repetitive tasks   Assessment details: Patient has been attending  outpatient physical therapy for low back pain; he has attended therapy for a total of 20 sessions.  Patient has shown slight regression in lumbar rotation to the right and right knee flexion/extension strength; no measurable improvements were noted at lumbar flexion/extension ROM or bilateral hip strength.  Patient reports a 42% functional mobility impairment, based on his response to the Modified Oswestry.  Patient will continue to benefit from skilled PT so that he can achieve his maximum level of function.  Prognosis: good    Goals  Plan Goals: SHORT TERM GOALS:     4 weeks  1. Patient will be independent/compliant with HEP.  Met  2. Patient will report pain no greater than 3/10 when performing self-care activities.  Met-3/10 with ADLs  3. Patient will report pain no greater than 3/10 when sitting for 1 hour to travel to appointments.  Ongoing, progressing-up to 4-5/10  4. Patient will be educated in proper lifting mechanics for improved back safety and be able to demonstrate on 4/5 trials.  Met    LONG TERM GOALS:   8 weeks  1. Patient will show a 25% improvement of lumbar AROM to show improved ability to perform functional activities.  Ongoing, progressing  2. Bilateral LE strength will improve to at least 4+/5 to allow for greater ease with daily tasks.  Ongoing, progressing  3. Patient to report less than 30% impairment on the Modified Oswestry for improved functional mobility.  Ongoing-42% impaired  4. Pt to report 50% decrease of pain/numbness/tingling into the right leg to show decreased nerve compression.  Met  5. Patient will be able to lift and carry 20# with proper body mechanics and no increase in back pain to allow for greater ease with daily tasks.  Ongoing, progressing-reports increased hip and back pain      Plan  Therapy options: will be seen for skilled therapy services  Planned modality interventions: cryotherapy, TENS, electrical stimulation/Russian stimulation, thermotherapy (hydrocollator  packs), ultrasound and traction  Planned therapy interventions: manual therapy, ADL retraining, balance/weight-bearing training, neuromuscular re-education, body mechanics training, postural training, soft tissue mobilization, flexibility, spinal/joint mobilization, functional ROM exercises, strengthening, gait training, stretching, home exercise program, therapeutic activities, IADL retraining, wheelchair management/propulsion training and joint mobilization  Frequency: 2x week  Duration in weeks: 8  Treatment plan discussed with: patient  Plan details: Re-evaluation   56445    Therapeutic exercise  52498  Therapeutic activity    55308  Neuromuscular re-education   17236  Manual therapy   09296  Gait training  65361    Unattended e-stim (Private)  27340  Unattended e-stim (Medicaid/Medicare)     Moist heat/cryotherapy 89118   Ultrasound   45892  Mechanical traction 85365           Recommendations: Continue as planned  Timeframe: 2 months  Prognosis to achieve goals: good      Timed:         Manual Therapy:    15     mins  57279;     Therapeutic Exercise:    31     mins  50947;     Neuromuscular Ruth:        mins  46977;    Therapeutic Activity:          mins  61508;     Gait Training:           mins  73058;     Ultrasound:          mins  68534;    Ionto                                   mins   20101  Self Care                            mins   82315    Un-Timed:  Electrical Stimulation:         mins  83579 ( );  Dry Needling          mins self-pay  Traction          mins 16329  Re-Eval                               mins  20964  Canalith Repos         mins 77243  Ice-10 min    Timed Treatment:   46   mins   Total Treatment:     56   mins          PT: Jacqui Desai PT     [place] License:  [id number]    Electronically signed by Jacqui Desai PT, 01/03/24, 10:01 AM EST    Certification Period: 1/3/2024 thru 4/1/2024  I certify that the therapy services are furnished while this patient is under my care.   The services outlined above are required by this patient, and will be reviewed every 90 days.         Physician Signature:__________________________________________________    PHYSICIAN: Alexis Sanchez MD  NPI: [id number]                                      DATE:  :     Please sign and return via fax to [email or web address] . Thank you, [facility]

## 2024-01-03 NOTE — PROGRESS NOTES
Physical Therapy Progress Note  Patient: Teo Griffith   : 1985  Diagnosis/ICD-10 Code:  S/P arthroscopy of right shoulder [Z98.890]  Referring practitioner: Gregorio Thomas DO  Date of Initial Visit: Type: THERAPY  Noted: 2023  Today's Date: 1/3/2024  Patient seen for 47 sessions         Visit Diagnoses:    ICD-10-CM ICD-9-CM   1. S/P arthroscopy of right shoulder  Z98.890 V45.89   2. Decreased ROM of right shoulder  M25.611 719.51       Subjective Questionnaire: QuickDASH: 38.64% impaired  Clinical Progress: worse  Home Program Compliance: Yes      Subjective Evaluation    History of Present Illness    Subjective comment: Patient reports that his shoulder pain has improved slightly since recent flare-up when trying to move a bag almost 2 weeks ago, but continues to bother him.  He notes that he goes for a follow-up with MD in 4 months.  Patient states that he continues to have trouble lifting weighted items or reaching overhead.Pain  Current pain ratin  At best pain rating: 3  At worst pain ratin           Objective          Active Range of Motion     Right Shoulder   Flexion: 120 degrees   Abduction: 95 degrees   External rotation 90°: 42 degrees  Internal rotation 90°: 57 degrees     Strength/Myotome Testing     Right Shoulder     Planes of Motion   Flexion: 3+   Abduction: 3+   External rotation at 0°: 3+   Internal rotation at 0°: 4-         Assessment & Plan       Assessment  Impairments: abnormal muscle firing, abnormal or restricted ROM, activity intolerance, impaired physical strength, lacks appropriate home exercise program, pain with function and safety issue   Functional limitations: carrying objects, lifting, sleeping, pulling, pushing, uncomfortable because of pain, stooping, reaching behind back and reaching overhead   Assessment details: Patient has been attending therapy for treatment s/p a right shoulder arthroscopy; he has attended therapy for a total of 47 sessions.   Patient displays slight regression in ROM and strength since last progress note, with patient having experienced recent flare-up in shoulder pain when trying to move a bag.  He reports 6/10 pain at worst and 3/10 pain at best.  Patient educated to contact referring provider regarding continued shoulder pain, with patient verbalizing understanding.  He will continue to benefit from skilled PT so that he can achieve his maximum level of function.  Prognosis: good    Goals  Plan Goals: STG 6 weeks  1 Pt will be instructed in a HEP.  Met  2 Pt will report a 50% decrease in radicular symptoms.  Ongoing, progressing-reports 15% improvement  3 Pt will demonstrate 120 degree of arom shoulder flexion.  Met  Pt will improve his Quick Dash to less than 40%.   Met    LTG 12 weeks  1. Pt will report back pain no greater than 3/10 with moderate lifting.  Ongoing, [id number]-3-7/10 pain with moderate lifting  2. Pt will demonstrate 4/5 right shoulder gross strength.  Ongoing, progressing  3. Pt will improve his Quick Dash score to less than 15%.  Ongoing, progress  4. Pt will demonstrate 140 degrees of AROM flexion with no distress.  Met      Plan  Therapy options: will be seen for skilled therapy services  Planned modality interventions: cryotherapy, TENS, thermotherapy (hydrocollator packs), traction and ultrasound  Planned therapy interventions: abdominal trunk stabilization, ADL retraining, body mechanics training, flexibility, functional ROM exercises, home exercise program, IADL retraining, joint mobilization, manual therapy, neuromuscular re-education, postural training, soft tissue mobilization, strengthening, stretching and therapeutic activities  Frequency: 2x week  Duration in weeks: 4  Treatment plan discussed with: patient  Plan details: Re-evaluation   97322  Therapeutic exercise  76550  Therapeutic activity    09780  Neuromuscular re-education   84466  Manual therapy   28291  Gait training  90633    Unattended  e-stim (Medicaid/Medicare)     Moist heat/cryotherapy 34288   Ultrasound   04235  Mechanical traction 70951         Recommendations: Continue as planned  Timeframe: 1 month  Prognosis to achieve goals: good      Timed:         Manual Therapy:    12     mins  53534;     Therapeutic Exercise:    20     mins  24829;     Neuromuscular Ruth:        mins  50362;    Therapeutic Activity:     10     mins  12368;     Gait Training:           mins  69675;     Ultrasound:          mins  85889;    Ionto                                   mins   50870  Self Care                            mins   18200    Un-Timed:  Electrical Stimulation:     10    mins  42919 ( );  Dry Needling          mins self-pay  Traction          mins 27306  Re-Eval                               mins  89899  Canalith Repos         mins 70784    Timed Treatment:   42   mins   Total Treatment:     52   mins          PT: Jacqui Desai PT     [place] License:  [id number]    Electronically signed by Jacqui Desai PT, 01/03/24, 9:34 AM EST    Certification Period: 1/3/2024 thru 4/1/2024  I certify that the therapy services are furnished while this patient is under my care.  The services outlined above are required by this patient, and will be reviewed every 90 days.         Physician Signature:__________________________________________________    PHYSICIAN: Gregorio Thomas DO  NPI: [id number]                                      DATE:  :     Please sign and return via fax to [email or web address] . Thank you, [facility]

## 2024-01-22 ENCOUNTER — TREATMENT (OUTPATIENT)
Dept: PHYSICAL THERAPY | Facility: CLINIC | Age: 39
End: 2024-01-22
Payer: OTHER GOVERNMENT

## 2024-01-22 DIAGNOSIS — M53.86 DECREASED ROM OF LUMBAR SPINE: ICD-10-CM

## 2024-01-22 DIAGNOSIS — Z98.890 S/P ARTHROSCOPY OF RIGHT SHOULDER: Primary | ICD-10-CM

## 2024-01-22 DIAGNOSIS — M54.41 CHRONIC BILATERAL LOW BACK PAIN WITH RIGHT-SIDED SCIATICA: Primary | ICD-10-CM

## 2024-01-22 DIAGNOSIS — G89.29 CHRONIC BILATERAL LOW BACK PAIN WITH RIGHT-SIDED SCIATICA: Primary | ICD-10-CM

## 2024-01-22 DIAGNOSIS — M25.611 DECREASED ROM OF RIGHT SHOULDER: ICD-10-CM

## 2024-01-22 PROCEDURE — 97110 THERAPEUTIC EXERCISES: CPT | Performed by: PHYSICAL THERAPIST

## 2024-01-22 PROCEDURE — 97140 MANUAL THERAPY 1/> REGIONS: CPT | Performed by: PHYSICAL THERAPIST

## 2024-01-22 NOTE — PROGRESS NOTES
Physical Therapy Daily Treatment Note      Patient: Teo Griffith   : 1985  Referring practitioner: Alexis Sanchez MD  Date of Initial Visit: Type: THERAPY  Noted: 2023  Today's Date: 2024  Patient seen for 21 sessions       Visit Diagnoses:    ICD-10-CM ICD-9-CM   1. Chronic bilateral low back pain with right-sided sciatica  M54.41 724.2    G89.29 724.3     338.29   2. Decreased ROM of lumbar spine  M53.86 724.9       Subjective Evaluation    History of Present Illness    Subjective comment: Patient reports 3/10 pain today.  He notes that he recently received epidural injection and it is helping some.Pain  Current pain rating: 3         Objective   See Exercise, Manual, and Modality Logs for complete treatment.       Assessment & Plan       Assessment  Assessment details: Treatment session initiated with manual therapy to the lumbar region, consisting of STM and PA glides.  Following manual therapy, patient performed there ex per flow sheet.  Exercises continue to focus on LE strengthening, core strengthening, and postural awareness.  He noted no increase in pain post-tx.  Patient will continue to be progressed per his tolerance and POC.      Timed:         Manual Therapy:    15     mins  59159;     Therapeutic Exercise:    30     mins  04652;     Neuromuscular Ruth:        mins  19628;    Therapeutic Activity:          mins  36964;     Gait Training:           mins  67372;     Ultrasound:          mins  85707;    Ionto                                   mins   98897  Self Care                            mins   94845      Un-Timed:  Electrical Stimulation:         mins  44699 ( );  Dry Needling          mins self-pay  Traction          mins 56048  Canalith Repos         mins 41487    Timed Treatment:   45   mins   Total Treatment:     45   mins    Jacqui Desai PT  [place] License: [id number]  Electronically signed by Jacqui Desai PT, 24, 11:46 AM EST.

## 2024-01-22 NOTE — PROGRESS NOTES
Physical Therapy Daily Treatment Note      Patient: Teo Griffith   : 1985  Referring practitioner: Gregorio Thomas DO  Date of Initial Visit: Type: THERAPY  Noted: 2023  Today's Date: 2024  Patient seen for 48 sessions       Visit Diagnoses:    ICD-10-CM ICD-9-CM   1. S/P arthroscopy of right shoulder  Z98.890 V45.89   2. Decreased ROM of right shoulder  M25.611 719.51       Subjective Evaluation    History of Present Illness    Subjective comment: Patient notes that his shoulder continues to be sore from when he moved a bag approximately 1 month ago.  He notes that he is going to call MD regarding continued pain.Pain  Current pain ratin         Objective   See Exercise, Manual, and Modality Logs for complete treatment.       Assessment & Plan       Assessment  Assessment details: Session consisted of there ex and therapeutic activities, per flow sheet.  Exercises continue to focus on shoulder ROM, scapular stabilization, and UE strengthening. Exercise progression not performed, due to continued reports of elevated pain levels.  Therapy session shortened, per patient's request, due to have a prior commitment.  Patient noted no change in pain at conclusion of session.  He will continue to be progressed per his tolerance and POC.      Timed:         Manual Therapy:         mins  80842;     Therapeutic Exercise:    21     mins  53109;     Neuromuscular Ruth:        mins  56079;    Therapeutic Activity:     10     mins  36318;     Gait Training:           mins  39884;     Ultrasound:          mins  75122;    Ionto                                   mins   90214  Self Care                            mins   46105      Un-Timed:  Electrical Stimulation:         mins  85618 ( );  Dry Needling          mins self-pay  Traction          mins 26594  Canalith Repos         mins 03596    Timed Treatment:   31   mins   Total Treatment:     31   mins    Jacqui Desai, PT  [place] License:  [id number]  Electronically signed by Jacqui Desai, PT, 01/22/24, 11:56 AM EST.

## 2024-01-24 ENCOUNTER — TREATMENT (OUTPATIENT)
Dept: PHYSICAL THERAPY | Facility: CLINIC | Age: 39
End: 2024-01-24
Payer: OTHER GOVERNMENT

## 2024-01-24 DIAGNOSIS — G89.29 CHRONIC BILATERAL LOW BACK PAIN WITH RIGHT-SIDED SCIATICA: Primary | ICD-10-CM

## 2024-01-24 DIAGNOSIS — M54.41 CHRONIC BILATERAL LOW BACK PAIN WITH RIGHT-SIDED SCIATICA: Primary | ICD-10-CM

## 2024-01-24 DIAGNOSIS — Z98.890 S/P ARTHROSCOPY OF RIGHT SHOULDER: Primary | ICD-10-CM

## 2024-01-24 DIAGNOSIS — M53.86 DECREASED ROM OF LUMBAR SPINE: ICD-10-CM

## 2024-01-24 DIAGNOSIS — M25.611 DECREASED ROM OF RIGHT SHOULDER: ICD-10-CM

## 2024-01-24 PROCEDURE — 97110 THERAPEUTIC EXERCISES: CPT | Performed by: PHYSICAL THERAPIST

## 2024-01-24 PROCEDURE — 97140 MANUAL THERAPY 1/> REGIONS: CPT | Performed by: PHYSICAL THERAPIST

## 2024-01-24 NOTE — PROGRESS NOTES
Physical Therapy Daily Treatment Note      Patient: Teo Griffith   : 1985  Referring practitioner: Alexis Sanchez MD  Date of Initial Visit: Type: THERAPY  Noted: 2023  Today's Date: 2024  Patient seen for 22 sessions       Visit Diagnoses:    ICD-10-CM ICD-9-CM   1. Chronic bilateral low back pain with right-sided sciatica  M54.41 724.2    G89.29 724.3     338.29   2. Decreased ROM of lumbar spine  M53.86 724.9       Subjective Evaluation    History of Present Illness    Subjective comment: Patient reports that he has increased back pain, noting that he is unsure if he twisted wrong or if it is due to picking his daughter up.  He states that his back feels like it did before when it was getting ready to go out.Pain  Current pain ratin         Objective   See Exercise, Manual, and Modality Logs for complete treatment.       Assessment & Plan       Assessment  Assessment details: Patient tolerated therapy session well, noting slight decrease in pain to 4/10 post-tx.  Treatment initiated with manual therapy to the lumbar region.  Muscle guarding was noted at bilateral lumbar paraspinals during STM.  Patient was noted to be slower with there ex, secondary to increased pain.  Exercises not progressed due to reports of elevated pain levels.  Session concluded with cryotherapy.  He will continue to be progressed per his tolerance and POC.      Timed:         Manual Therapy:    14     mins  24177;     Therapeutic Exercise:    32     mins  78342;     Neuromuscular Ruth:        mins  12317;    Therapeutic Activity:          mins  87357;     Gait Training:           mins  51556;     Ultrasound:          mins  89086;    Ionto                                   mins   95718  Self Care                            mins   06160      Un-Timed:  Electrical Stimulation:         mins  60031 ( );  Dry Needling          mins self-pay  Traction          mins 84870  Canalith Repos         mins  86018  Ice-10 min    Timed Treatment:   46   mins   Total Treatment:     56   mins    Jacqui Desai PT  [place] License: [id number]  Electronically signed by Jacqui Desai PT, 01/24/24, 1:10 PM EST.

## 2024-01-24 NOTE — PROGRESS NOTES
Physical Therapy Daily Treatment Note      Patient: Teo Griffith   : 1985  Referring practitioner: Gregorio Thomas DO  Date of Initial Visit: Type: THERAPY  Noted: 2023  Today's Date: 2024  Patient seen for 49 sessions       Visit Diagnoses:    ICD-10-CM ICD-9-CM   1. S/P arthroscopy of right shoulder  Z98.890 V45.89   2. Decreased ROM of right shoulder  M25.611 719.51       Subjective Evaluation    History of Present Illness    Subjective comment: Patient reports 3/10 pain today.  He notes that he has not called MD yet, but will contact MD by the end of the week.Pain  Current pain rating: 3         Objective   See Exercise, Manual, and Modality Logs for complete treatment.       Assessment & Plan       Assessment  Assessment details: Patient educated to contact MD regarding continued pain in his shoulder, with patient verbalizing understanding.  Therapy session consisted of there ex, therapeutic activities, cryotherapy, and ESTIM.  No adverse reactions were noted with modalities.  Exercises focused on functional movement, UE strengthening, shoulder ROM, and scapular stabilization.  He will continue to be progressed per his tolerance and POC.      Timed:         Manual Therapy:         mins  31286;     Therapeutic Exercise:    32     mins  27642;     Neuromuscular Ruth:        mins  62319;    Therapeutic Activity:     12     mins  56223;     Gait Training:           mins  94053;     Ultrasound:          mins  34324;    Ionto                                   mins   33952  Self Care                            mins   52115      Un-Timed:  Electrical Stimulation:    10     mins  99225 ( );  Dry Needling          mins self-pay  Traction          mins 18558  Canalith Repos         mins 11238    Timed Treatment:   44   mins   Total Treatment:     54   mins    Jacqui Dseai PT  [place] License: [id number]  Electronically signed by Jacqui Desai PT, 24, 1:05 PM EST.

## 2024-01-29 ENCOUNTER — TREATMENT (OUTPATIENT)
Dept: PHYSICAL THERAPY | Facility: CLINIC | Age: 39
End: 2024-01-29
Payer: OTHER GOVERNMENT

## 2024-01-29 DIAGNOSIS — Z98.890 S/P ARTHROSCOPY OF RIGHT SHOULDER: Primary | ICD-10-CM

## 2024-01-29 DIAGNOSIS — G89.29 CHRONIC BILATERAL LOW BACK PAIN WITH RIGHT-SIDED SCIATICA: Primary | ICD-10-CM

## 2024-01-29 DIAGNOSIS — M54.41 CHRONIC BILATERAL LOW BACK PAIN WITH RIGHT-SIDED SCIATICA: Primary | ICD-10-CM

## 2024-01-29 DIAGNOSIS — M53.86 DECREASED ROM OF LUMBAR SPINE: ICD-10-CM

## 2024-01-29 DIAGNOSIS — M25.611 DECREASED ROM OF RIGHT SHOULDER: ICD-10-CM

## 2024-01-29 PROCEDURE — 97140 MANUAL THERAPY 1/> REGIONS: CPT | Performed by: PHYSICAL THERAPIST

## 2024-01-29 PROCEDURE — 97110 THERAPEUTIC EXERCISES: CPT | Performed by: PHYSICAL THERAPIST

## 2024-01-29 NOTE — PROGRESS NOTES
Physical Therapy Daily Treatment Note      Patient: Teo Griffith   : 1985  Referring practitioner: Alexis Sanchez MD  Date of Initial Visit: Type: THERAPY  Noted: 2023  Today's Date: 2024  Patient seen for 23 sessions       Visit Diagnoses:    ICD-10-CM ICD-9-CM   1. Chronic bilateral low back pain with right-sided sciatica  M54.41 724.2    G89.29 724.3     338.29   2. Decreased ROM of lumbar spine  M53.86 724.9       Subjective Evaluation    History of Present Illness    Subjective comment: Patient reports 3/10 pain for his back, noting that it feels a little stiff today.Pain  Current pain rating: 3         Objective   See Exercise, Manual, and Modality Logs for complete treatment.       Assessment & Plan       Assessment  Assessment details: Therapy session consisted of manual therapy, there ex, and cryotherapy.  STM performed at lumbar musculature to encourage improved soft tissue pliability.  Patient displayed good form and posture during there ex, requiring minimal cues for correction with form.  He noted no change in pain post-tx.  Patient will continue to be progressed per his tolerance and POC.      Timed:         Manual Therapy:    15     mins  93441;     Therapeutic Exercise:    30     mins  62753;     Neuromuscular Ruth:        mins  18948;    Therapeutic Activity:          mins  54828;     Gait Training:           mins  67496;     Ultrasound:          mins  71992;    Ionto                                   mins   83733  Self Care                            mins   23790      Un-Timed:  Electrical Stimulation:         mins  76309 ( );  Dry Needling          mins self-pay  Traction          mins 77167  Canalith Repos         mins 62130  Ice-10 min    Timed Treatment:   45   mins   Total Treatment:     55   mins    Jacqui Desai PT  [place] License: [id number]  Electronically signed by Jacqui Desai PT, 24, 8:01 AM EST.

## 2024-01-29 NOTE — PROGRESS NOTES
Physical Therapy Daily Treatment Note      Patient: Teo Griffith   : 1985  Referring practitioner: Gregorio Thomas DO  Date of Initial Visit: Type: THERAPY  Noted: 2023  Today's Date: 2024  Patient seen for 50 sessions       Visit Diagnoses:    ICD-10-CM ICD-9-CM   1. S/P arthroscopy of right shoulder  Z98.890 V45.89   2. Decreased ROM of right shoulder  M25.611 719.51       Subjective Evaluation    History of Present Illness    Subjective comment: Patient reports that his shoulder has finally started feeling a little better.Pain  Current pain rating: 3         Objective   See Exercise, Manual, and Modality Logs for complete treatment.       Assessment & Plan       Assessment  Assessment details: Therapy session initiated with there ex and therapeutic activities followed by cryotherapy and ESTIM.  No adverse reactions were noted with modalities.  Patient progressed to include increased theraband resistance for improved strengthening.  Patient displayed good tolerance with exercise progression.  He will continue to be progressed per his tolerance and POC.        Timed:         Manual Therapy:         mins  51726;     Therapeutic Exercise:    33     mins  76474;     Neuromuscular Ruth:        mins  11511;    Therapeutic Activity:     12     mins  03102;     Gait Training:           mins  42498;     Ultrasound:          mins  57974;    Ionto                                   mins   26956  Self Care                            mins   93524      Un-Timed:  Electrical Stimulation:    10     mins  44920 ( );  Dry Needling          mins self-pay  Traction          mins 71606  Canalith Repos         mins 15962    Timed Treatment:   45   mins   Total Treatment:     55   mins    Jacqui Desai PT  [place] License: [id number]  Electronically signed by Jacqui Desai PT, 24, 7:51 AM EST.

## 2024-01-31 ENCOUNTER — TREATMENT (OUTPATIENT)
Dept: PHYSICAL THERAPY | Facility: CLINIC | Age: 39
End: 2024-01-31
Payer: OTHER GOVERNMENT

## 2024-01-31 DIAGNOSIS — Z98.890 S/P ARTHROSCOPY OF RIGHT SHOULDER: Primary | ICD-10-CM

## 2024-01-31 DIAGNOSIS — M25.611 DECREASED ROM OF RIGHT SHOULDER: ICD-10-CM

## 2024-01-31 DIAGNOSIS — M53.86 DECREASED ROM OF LUMBAR SPINE: ICD-10-CM

## 2024-01-31 DIAGNOSIS — G89.29 CHRONIC BILATERAL LOW BACK PAIN WITH RIGHT-SIDED SCIATICA: Primary | ICD-10-CM

## 2024-01-31 DIAGNOSIS — M54.41 CHRONIC BILATERAL LOW BACK PAIN WITH RIGHT-SIDED SCIATICA: Primary | ICD-10-CM

## 2024-01-31 PROCEDURE — 97110 THERAPEUTIC EXERCISES: CPT | Performed by: PHYSICAL THERAPIST

## 2024-01-31 PROCEDURE — 97140 MANUAL THERAPY 1/> REGIONS: CPT | Performed by: PHYSICAL THERAPIST

## 2024-01-31 NOTE — PROGRESS NOTES
"Physical Therapy Daily Treatment Note      Patient: Teo Griffith   : 1985  Referring practitioner: Alexis Sanchez MD  Date of Initial Visit: Type: THERAPY  Noted: 2023  Today's Date: 2024  Patient seen for 24 sessions       Visit Diagnoses:    ICD-10-CM ICD-9-CM   1. Chronic bilateral low back pain with right-sided sciatica  M54.41 724.2    G89.29 724.3     338.29   2. Decreased ROM of lumbar spine  M53.86 724.9       Subjective Evaluation    History of Present Illness    Subjective comment: Patient reports that his back and right leg have been hurting, noting that pain has been extending to about mid-thigh in the right LE.  He believes it may be due to the cold weather.Pain  Current pain rating: 3         Objective   See Exercise, Manual, and Modality Logs for complete treatment.       Assessment & Plan       Assessment  Assessment details: Patient tolerated therapy well, including progression of exercises.  Treatment consisted of there ex and manual therapy, followed by cryotherapy.  Increased muscle guarding was noted at left lumbar paraspinals during STM; however, patient reported greater tenderness on the right.  There ex progressed to include \"W\" stepping and lateral step ups to encourage increased LE strengthening.  Patient reported no increase in pain post-tx.  He will continue to be progressed per his tolerance and POC.          Timed:         Manual Therapy:    12     mins  60298;     Therapeutic Exercise:    34     mins  55710;     Neuromuscular Ruth:        mins  34805;    Therapeutic Activity:          mins  74479;     Gait Training:           mins  13098;     Ultrasound:          mins  09141;    Ionto                                   mins   69006  Self Care                            mins   26575      Un-Timed:  Electrical Stimulation:         mins  45920 ( );  Dry Needling          mins self-pay  Traction          mins 91551  Canalith Repos         mins 40369  Ice-10 " min  Timed Treatment:    46  mins   Total Treatment:     56   mins    Jacqui Desai PT  [place] License: [id number]  Electronically signed by Jacqui Desai PT, 01/31/24, 11:16 AM EST.

## 2024-01-31 NOTE — PROGRESS NOTES
Physical Therapy Daily Treatment Note      Patient: Teo Griffith   : 1985  Referring practitioner: Gregorio Thomas DO  Date of Initial Visit: Type: THERAPY  Noted: 2023  Today's Date: 2024  Patient seen for 51 sessions       Visit Diagnoses:    ICD-10-CM ICD-9-CM   1. S/P arthroscopy of right shoulder  Z98.890 V45.89   2. Decreased ROM of right shoulder  M25.611 719.51       Subjective Evaluation    History of Present Illness    Subjective comment: Patient reports that he has 3/10 shoulder pain today.Pain  Current pain rating: 3         Objective   See Exercise, Manual, and Modality Logs for complete treatment.       Assessment & Plan       Assessment  Assessment details: Patient tolerated today's session well, reporting no increase in pain post-tx.  Treatment consisted of there ex and therapeutic activities followed by cryotherapy and ESTIM.  Exercises focused on strengthening, scapular stabilization, shoulder ROM, and functional movement.  Patient had good posture and form with exercises.  He noted no increase in pain post-tx.  Patient will continue to be progressed per his tolerance and POC.      Timed:         Manual Therapy:         mins  12061;     Therapeutic Exercise:    31     mins  54825;     Neuromuscular Ruth:        mins  69886;    Therapeutic Activity:     13     mins  81508;     Gait Training:           mins  94190;     Ultrasound:          mins  17273;    Ionto                                   mins   24016  Self Care                            mins   91715      Un-Timed:  Electrical Stimulation:    10     mins  35218 ( );  Dry Needling          mins self-pay  Traction          mins 30476  Canalith Repos         mins 16274    Timed Treatment:   44   mins   Total Treatment:     54   mins    Jacqui Desai PT  [place] License: [id number]  Electronically signed by Jacqui Desai PT, 24, 1:41 PM EST.

## 2024-02-12 ENCOUNTER — TREATMENT (OUTPATIENT)
Dept: PHYSICAL THERAPY | Facility: CLINIC | Age: 39
End: 2024-02-12
Payer: OTHER GOVERNMENT

## 2024-02-12 DIAGNOSIS — M54.41 CHRONIC BILATERAL LOW BACK PAIN WITH RIGHT-SIDED SCIATICA: Primary | ICD-10-CM

## 2024-02-12 DIAGNOSIS — G89.29 CHRONIC BILATERAL LOW BACK PAIN WITH RIGHT-SIDED SCIATICA: Primary | ICD-10-CM

## 2024-02-12 DIAGNOSIS — M25.611 DECREASED ROM OF RIGHT SHOULDER: ICD-10-CM

## 2024-02-12 DIAGNOSIS — Z98.890 S/P ARTHROSCOPY OF RIGHT SHOULDER: Primary | ICD-10-CM

## 2024-02-12 DIAGNOSIS — M53.86 DECREASED ROM OF LUMBAR SPINE: ICD-10-CM

## 2024-02-12 PROCEDURE — 97140 MANUAL THERAPY 1/> REGIONS: CPT | Performed by: PHYSICAL THERAPIST

## 2024-02-12 PROCEDURE — 97110 THERAPEUTIC EXERCISES: CPT | Performed by: PHYSICAL THERAPIST

## 2024-02-12 PROCEDURE — 97530 THERAPEUTIC ACTIVITIES: CPT | Performed by: PHYSICAL THERAPIST

## 2024-02-12 PROCEDURE — G0283 ELEC STIM OTHER THAN WOUND: HCPCS | Performed by: PHYSICAL THERAPIST

## 2024-02-14 ENCOUNTER — TREATMENT (OUTPATIENT)
Dept: PHYSICAL THERAPY | Facility: CLINIC | Age: 39
End: 2024-02-14
Payer: OTHER GOVERNMENT

## 2024-02-14 DIAGNOSIS — M53.86 DECREASED ROM OF LUMBAR SPINE: ICD-10-CM

## 2024-02-14 DIAGNOSIS — M25.611 DECREASED ROM OF RIGHT SHOULDER: ICD-10-CM

## 2024-02-14 DIAGNOSIS — Z98.890 S/P ARTHROSCOPY OF RIGHT SHOULDER: Primary | ICD-10-CM

## 2024-02-14 DIAGNOSIS — M54.41 CHRONIC BILATERAL LOW BACK PAIN WITH RIGHT-SIDED SCIATICA: Primary | ICD-10-CM

## 2024-02-14 DIAGNOSIS — G89.29 CHRONIC BILATERAL LOW BACK PAIN WITH RIGHT-SIDED SCIATICA: Primary | ICD-10-CM

## 2024-02-14 PROCEDURE — 97110 THERAPEUTIC EXERCISES: CPT | Performed by: PHYSICAL THERAPIST

## 2024-02-14 PROCEDURE — 97140 MANUAL THERAPY 1/> REGIONS: CPT | Performed by: PHYSICAL THERAPIST

## 2024-02-26 ENCOUNTER — TREATMENT (OUTPATIENT)
Dept: PHYSICAL THERAPY | Facility: CLINIC | Age: 39
End: 2024-02-26
Payer: OTHER GOVERNMENT

## 2024-02-26 DIAGNOSIS — M53.86 DECREASED ROM OF LUMBAR SPINE: ICD-10-CM

## 2024-02-26 DIAGNOSIS — M54.41 CHRONIC BILATERAL LOW BACK PAIN WITH RIGHT-SIDED SCIATICA: Primary | ICD-10-CM

## 2024-02-26 DIAGNOSIS — M25.611 DECREASED ROM OF RIGHT SHOULDER: ICD-10-CM

## 2024-02-26 DIAGNOSIS — Z98.890 S/P ARTHROSCOPY OF RIGHT SHOULDER: Primary | ICD-10-CM

## 2024-02-26 DIAGNOSIS — G89.29 CHRONIC BILATERAL LOW BACK PAIN WITH RIGHT-SIDED SCIATICA: Primary | ICD-10-CM

## 2024-02-26 PROCEDURE — G0283 ELEC STIM OTHER THAN WOUND: HCPCS | Performed by: PHYSICAL THERAPIST

## 2024-02-26 PROCEDURE — 97110 THERAPEUTIC EXERCISES: CPT | Performed by: PHYSICAL THERAPIST

## 2024-02-26 PROCEDURE — 97140 MANUAL THERAPY 1/> REGIONS: CPT | Performed by: PHYSICAL THERAPIST

## 2024-02-26 PROCEDURE — 97530 THERAPEUTIC ACTIVITIES: CPT | Performed by: PHYSICAL THERAPIST

## 2024-02-26 NOTE — PROGRESS NOTES
Physical Therapy Progress Note  Patient: Teo Griffith   : 1985  Diagnosis/ICD-10 Code:  Chronic bilateral low back pain with right-sided sciatica [M54.41, G89.29]  Referring practitioner: Gregorio Thomas DO  Date of Initial Visit: Type: THERAPY  Noted: 2023  Today's Date: 2024  Patient seen for 27 sessions         Visit Diagnoses:    ICD-10-CM ICD-9-CM   1. Chronic bilateral low back pain with right-sided sciatica  M54.41 724.2    G89.29 724.3     338.29   2. Decreased ROM of lumbar spine  M53.86 724.9         Teo Griffith reports: He is experiencing increased pain today in his back, noting that he just woke up that way.  Patient reports sciatica pain in the left LE; he notes that it is usually on the right LE.  Subjective Questionnaire: Oswestry:   Clinical Progress: worse  Home Program Compliance: Yes      Subjective Evaluation    Pain  Current pain ratin  At best pain rating: 3  At worst pain ratin           Objective          Active Range of Motion     Lumbar   Flexion: Active lumbar flexion: 50%   Extension: Active lumbar extension: 0%   Left rotation: Active left lumbar rotation: 50%   Right rotation: Active right lumbar rotation: 50%     Strength/Myotome Testing     Left Hip   Planes of Motion   Flexion: 4-  Abduction: 4-  Adduction: 4-    Right Hip   Planes of Motion   Flexion: 4-  Abduction: 4-  Adduction: 4-    Left Knee   Flexion: 4-  Extension: 4    Right Knee   Flexion: 4  Extension: 4        Assessment & Plan       Assessment  Impairments: abnormal gait, abnormal muscle firing, abnormal or restricted ROM, activity intolerance, impaired physical strength, lacks appropriate home exercise program and pain with function   Functional limitations: lifting, sleeping, pulling, pushing, uncomfortable because of pain, moving in bed, sitting, standing, stooping and unable to perform repetitive tasks   Assessment details: Patient has been attending outpatient physical therapy  for low back pain; he has attended therapy for a total of 27 sessions.  Patient has shown regression in LE strength and lumbar ROM.  He is currently unable to achieve lumbar extension past 0%.  Patient reports a 42% functional mobility impairment, based on his response to the Modified Oswestry.  Patient will continue to benefit from skilled PT so that he can achieve his maximum level of function.  Prognosis: good    Goals  Plan Goals: SHORT TERM GOALS:     4 weeks  1. Patient will be independent/compliant with HEP.  Met  2. Patient will report pain no greater than 3/10 when performing self-care activities.  Met  3. Patient will report pain no greater than 3/10 when sitting for 1 hour to travel to appointments.  Ongoing, progressing  4. Patient will be educated in proper lifting mechanics for improved back safety and be able to demonstrate on 4/5 trials.  Met    LONG TERM GOALS:   8 weeks  1. Patient will show a 25% improvement of lumbar AROM to show improved ability to perform functional activities.  Ongoing, progressing  2. Bilateral LE strength will improve to at least 4+/5 to allow for greater ease with daily tasks.  Ongoing, progressing  3. Patient to report less than 30% impairment on the Modified Oswestry for improved functional mobility.  Ongoing-42% impaired  4. Pt to report 50% decrease of pain/numbness/tingling into the right leg to show decreased nerve compression.  Met  5. Patient will be able to lift and carry 20# with proper body mechanics and no increase in back pain to allow for greater ease with daily tasks.  Ongoing, progressing-reports increased hip and back pain      Plan  Therapy options: will be seen for skilled therapy services  Planned modality interventions: cryotherapy, TENS, electrical stimulation/Russian stimulation, thermotherapy (hydrocollator packs), ultrasound and traction  Planned therapy interventions: manual therapy, ADL retraining, balance/weight-bearing training, neuromuscular  re-education, body mechanics training, postural training, soft tissue mobilization, flexibility, spinal/joint mobilization, functional ROM exercises, strengthening, gait training, stretching, home exercise program, therapeutic activities, IADL retraining, wheelchair management/propulsion training and joint mobilization  Frequency: 2x week  Duration in weeks: 4  Treatment plan discussed with: patient  Plan details: Re-evaluation   24595    Therapeutic exercise  64384  Therapeutic activity    51657  Neuromuscular re-education   41624  Manual therapy   60332  Gait training  85984    Unattended e-stim (Private)  86948  Unattended e-stim (Medicaid/Medicare)     Moist heat/cryotherapy 76860   Ultrasound   67431  Mechanical traction 75433           Recommendations: Continue as planned  Timeframe: 1 month  Prognosis to achieve goals: good      Timed:         Manual Therapy:    15     mins  92257;     Therapeutic Exercise:    19     mins  79861;     Neuromuscular Ruth:        mins  07362;    Therapeutic Activity:          mins  97106;     Gait Training:           mins  84540;     Ultrasound:          mins  93713;    Ionto                                   mins   34409  Self Care                            mins   30126    Un-Timed:  Electrical Stimulation:    10     mins  01321 ( );  Dry Needling          mins self-pay  Traction          mins 35912  Re-Eval                               mins  87861  Canalith Repos         mins 84484    Timed Treatment:   34   mins   Total Treatment:     44   mins          PT: Jacqui Desai PT     [place] License:  [id number]    Electronically signed by Jacqui Desai PT, 02/26/24, 10:06 AM EST    Certification Period: 2/26/2024 thru 5/25/2024  I certify that the therapy services are furnished while this patient is under my care.  The services outlined above are required by this patient, and will be reviewed every 90 days.         Physician  Signature:__________________________________________________    PHYSICIAN: Gregorio Thomas DO  NPI: [id number]                                      DATE:  :     Please sign and return via fax to [email or web address] . Thank you, [facility]

## 2024-02-26 NOTE — PROGRESS NOTES
"  Physical Therapy Re Certification Of Plan of Care  Patient: Teo Griffith   : 1985  Diagnosis/ICD-10 Code:  S/P arthroscopy of right shoulder [Z98.890]  Referring practitioner: Gregorio Thomas DO  Date of Initial Visit: Type: THERAPY  Noted: 2023  Today's Date: 2024  Patient seen for 54 sessions         Visit Diagnoses:    ICD-10-CM ICD-9-CM   1. S/P arthroscopy of right shoulder  Z98.890 V45.89   2. Decreased ROM of right shoulder  M25.611 719.51       Subjective Questionnaire: QuickDASH: 36.36% impaired  Clinical Progress: improved  Home Program Compliance: Yes    Subjective Evaluation    History of Present Illness    Subjective comment: Patient reports that he threw a bag approximately 1 week ago, when he felt a \"pop\" in the right shoulder and felt numbness in the 4th and 5th digits.  He saw MD, who is trying to get an MRI ordered; patient notes that MD believes that he has tore the biceps muscle.Pain  Current pain rating: 3  At best pain rating: 3  At worst pain ratin           Objective          Tenderness     Right Shoulder  Tenderness in the biceps tendon (proximal) and bicipital groove.     Additional Tenderness Details  \"Phillip\" muscle observed at today's session.    Active Range of Motion     Right Shoulder   Flexion: 127 degrees   Abduction: 102 degrees   External rotation 90°: 50 degrees  Internal rotation 90°: 58 degrees     Strength/Myotome Testing     Right Shoulder     Planes of Motion   Flexion: 4-   Abduction: 4-   External rotation at 0°: 4-   Internal rotation at 0°: 4-     Isolated Muscles   Biceps: 4   Triceps: 4           Assessment & Plan       Assessment  Impairments: abnormal muscle firing, abnormal or restricted ROM, activity intolerance, impaired physical strength, lacks appropriate home exercise program, pain with function and safety issue   Functional limitations: carrying objects, lifting, sleeping, pulling, pushing, uncomfortable because of pain, stooping, " "reaching behind back and reaching overhead   Assessment details: Patient has been attending therapy for treatment s/p a right shoulder arthroscopy; he has attended therapy for a total of 54 sessions.  Patient continues to display weakness and ROM limitations at the right shoulder, but has shown improvements since last progress note.  He currently displays 127 degrees right shoulder flexion AROM.  A \"sosa muscle\" is present at right biceps at today's session during assessment, with patient reporting tenderness.  Patient reports a 36.36% functional mobility impairment on the QuickDash.  He will continue to benefit from skilled PT so that he can achieve his maximum level of function.  Prognosis: good    Goals  Plan Goals: STG 6 weeks  1 Pt will be instructed in a HEP.  Met  2 Pt will report a 50% decrease in radicular symptoms.  Ongoing, progressing-reports 15% improvement  3 Pt will demonstrate 120 degree of arom shoulder flexion.  Met  Pt will improve his Quick Dash to less than 40%.   Met    LTG 12 weeks  1. Pt will report back pain no greater than 3/10 with moderate lifting.  Ongoing, progressing  2. Pt will demonstrate 4/5 right shoulder gross strength.  Ongoing, progressing  3. Pt will improve his Quick Dash score to less than 15%.  Ongoing, progressing-36.36% impaired  4. Pt will demonstrate 140 degrees of AROM flexion with no distress.  Met      Plan  Therapy options: will be seen for skilled therapy services  Planned modality interventions: cryotherapy, TENS, thermotherapy (hydrocollator packs), traction and ultrasound  Planned therapy interventions: abdominal trunk stabilization, ADL retraining, body mechanics training, flexibility, functional ROM exercises, home exercise program, IADL retraining, joint mobilization, manual therapy, neuromuscular re-education, postural training, soft tissue mobilization, strengthening, stretching and therapeutic activities  Frequency: 2x week  Duration in weeks: " 8  Treatment plan discussed with: patient  Plan details: Re-evaluation   33113  Therapeutic exercise  13607  Therapeutic activity    47930  Neuromuscular re-education   81857  Manual therapy   20301  Gait training  82559    Unattended e-stim (Medicaid/Medicare)     Moist heat/cryotherapy 11842   Ultrasound   73726  Mechanical traction 52683             Recommendations: Continue as planned  Timeframe: 2 months  Prognosis to achieve goals: good      Timed:         Manual Therapy:         mins  81931;     Therapeutic Exercise:    34     mins  45048;     Neuromuscular Ruth:        mins  11808;    Therapeutic Activity:     12     mins  14593;     Gait Training:           mins  37861;     Ultrasound:          mins  87047;    Ionto                                   mins   21578  Self Care                            mins   42454    Un-Timed:  Electrical Stimulation:         mins  05250 ( );  Dry Needling          mins self-pay  Traction          mins 60994  Re-Eval                               mins  09273  Canalith Repos         mins 24430  Ice-10 min    Timed Treatment:   46   mins   Total Treatment:     56   mins          PT: Jacqui Desai PT     [place] License:  [id number]    Electronically signed by Jacqui Desai PT, 02/26/24, 9:29 AM EST    Certification Period: 2/26/2024 thru 5/25/2024  I certify that the therapy services are furnished while this patient is under my care.  The services outlined above are required by this patient, and will be reviewed every 90 days.         Physician Signature:__________________________________________________    PHYSICIAN: Gregorio Thomas DO  NPI: [id number]                                      DATE:  :     Please sign and return via fax to [email or web address] . Thank you, [facility] Physical Therapy

## 2024-02-28 ENCOUNTER — TREATMENT (OUTPATIENT)
Dept: PHYSICAL THERAPY | Facility: CLINIC | Age: 39
End: 2024-02-28
Payer: OTHER GOVERNMENT

## 2024-02-28 DIAGNOSIS — M25.611 DECREASED ROM OF RIGHT SHOULDER: ICD-10-CM

## 2024-02-28 DIAGNOSIS — G89.29 CHRONIC BILATERAL LOW BACK PAIN WITH RIGHT-SIDED SCIATICA: Primary | ICD-10-CM

## 2024-02-28 DIAGNOSIS — Z98.890 S/P ARTHROSCOPY OF RIGHT SHOULDER: Primary | ICD-10-CM

## 2024-02-28 DIAGNOSIS — M54.41 CHRONIC BILATERAL LOW BACK PAIN WITH RIGHT-SIDED SCIATICA: Primary | ICD-10-CM

## 2024-02-28 DIAGNOSIS — M53.86 DECREASED ROM OF LUMBAR SPINE: ICD-10-CM

## 2024-02-28 PROCEDURE — 97140 MANUAL THERAPY 1/> REGIONS: CPT | Performed by: PHYSICAL THERAPIST

## 2024-02-28 PROCEDURE — 97110 THERAPEUTIC EXERCISES: CPT | Performed by: PHYSICAL THERAPIST

## 2024-02-28 PROCEDURE — G0283 ELEC STIM OTHER THAN WOUND: HCPCS | Performed by: PHYSICAL THERAPIST

## 2024-02-28 NOTE — PROGRESS NOTES
Physical Therapy Daily Treatment Note      Patient: Teo Griffith   : 1985  Referring practitioner: Gregorio Thomas DO  Date of Initial Visit: Type: THERAPY  Noted: 2023  Today's Date: 2024  Patient seen for 55 sessions       Visit Diagnoses:    ICD-10-CM ICD-9-CM   1. S/P arthroscopy of right shoulder  Z98.890 V45.89   2. Decreased ROM of right shoulder  M25.611 719.51       Subjective Evaluation    History of Present Illness    Subjective comment: Patient notes that his shoulder is a little sore today.Pain  Current pain rating: 3           Objective   See Exercise, Manual, and Modality Logs for complete treatment.       Assessment & Plan       Assessment  Assessment details: Patient tolerated today's session well, noting no increase in pain post-tx.  Therapy session consisted of there ex and therapeutic activities, followed by cryotherapy.  Exercises continued to focus on shoulder ROM, strengthening, and scapular stabilization.  Patient noted soreness when performing exercises in abduction, but noted that it did improve with rest.  He will continue to be progressed per his tolerance and POC.          Timed:         Manual Therapy:         mins  64209;     Therapeutic Exercise:    32     mins  16183;     Neuromuscular Ruth:        mins  15934;    Therapeutic Activity:     10     mins  96189;     Gait Training:           mins  27791;     Ultrasound:          mins  44425;    Ionto                                   mins   57551  Self Care                            mins   01228      Un-Timed:  Electrical Stimulation:         mins  39838 ( );  Dry Needling          mins self-pay  Traction          mins 40773  Canalith Repos         mins 06468  Ice-10 min    Timed Treatment:   42   mins   Total Treatment:     52   mins    Jacqui Desai PT  [place] License: [id number]  Electronically signed by Jacqui Desai PT, 24, 11:37 AM EST.

## 2024-02-28 NOTE — PROGRESS NOTES
Physical Therapy Daily Treatment Note      Patient: Teo Griffith   : 1985  Referring practitioner: Gregorio Thomas DO  Date of Initial Visit: Type: THERAPY  Noted: 2023  Today's Date: 2024  Patient seen for 28 sessions       Visit Diagnoses:    ICD-10-CM ICD-9-CM   1. Chronic bilateral low back pain with right-sided sciatica  M54.41 724.2    G89.29 724.3     338.29   2. Decreased ROM of lumbar spine  M53.86 724.9       Subjective Evaluation    History of Present Illness    Subjective comment: Patient notes that his back is feeling a little better, but continues to hurt more than usual.Pain  Current pain ratin         Objective   See Exercise, Manual, and Modality Logs for complete treatment.       Assessment & Plan       Assessment  Assessment details: Session initiated with manual therapy to the lumbar region to assist with pain control, mobility, and muscle tightness.  Increased muscle guarding was noted at lumbar paraspinals, with greatest tightness on the left.  There ex continued to be reduced at today's session, due to elevated pain levels.  Treatment concluded with ice/ESTIM, with no adverse reactions.  He will continue to be progressed per his tolerance and POC.      Timed:         Manual Therapy:    15     mins  01812;     Therapeutic Exercise:    20     mins  02893;     Neuromuscular Ruth:        mins  23544;    Therapeutic Activity:          mins  41928;     Gait Training:           mins  42263;     Ultrasound:          mins  34445;    Ionto                                   mins   06393  Self Care                            mins   28488      Un-Timed:  Electrical Stimulation:    10     mins  93773 ( );  Dry Needling          mins self-pay  Traction          mins 75141  Canalith Repos         mins 65760    Timed Treatment:   35   mins   Total Treatment:     45   mins    Jacqui Desai, PT  [place] License: [id number]  Electronically signed by Jacqui Desai PT, 24,  11:43 AM EST.

## 2024-03-06 ENCOUNTER — TREATMENT (OUTPATIENT)
Dept: PHYSICAL THERAPY | Facility: CLINIC | Age: 39
End: 2024-03-06
Payer: OTHER GOVERNMENT

## 2024-03-06 DIAGNOSIS — M53.86 DECREASED ROM OF LUMBAR SPINE: ICD-10-CM

## 2024-03-06 DIAGNOSIS — M25.611 DECREASED ROM OF RIGHT SHOULDER: ICD-10-CM

## 2024-03-06 DIAGNOSIS — M54.41 CHRONIC BILATERAL LOW BACK PAIN WITH RIGHT-SIDED SCIATICA: Primary | ICD-10-CM

## 2024-03-06 DIAGNOSIS — G89.29 CHRONIC BILATERAL LOW BACK PAIN WITH RIGHT-SIDED SCIATICA: Primary | ICD-10-CM

## 2024-03-06 DIAGNOSIS — Z98.890 S/P ARTHROSCOPY OF RIGHT SHOULDER: Primary | ICD-10-CM

## 2024-03-06 PROCEDURE — 97140 MANUAL THERAPY 1/> REGIONS: CPT | Performed by: PHYSICAL THERAPIST

## 2024-03-06 PROCEDURE — 97110 THERAPEUTIC EXERCISES: CPT | Performed by: PHYSICAL THERAPIST

## 2024-03-06 PROCEDURE — G0283 ELEC STIM OTHER THAN WOUND: HCPCS | Performed by: PHYSICAL THERAPIST

## 2024-03-06 NOTE — PROGRESS NOTES
Physical Therapy Daily Treatment Note      Patient: Teo Griffith   : 1985  Referring practitioner: Gregorio Thomas DO  Date of Initial Visit: Type: THERAPY  Noted: 2023  Today's Date: 3/6/2024  Patient seen for 29 sessions       Visit Diagnoses:    ICD-10-CM ICD-9-CM   1. Chronic bilateral low back pain with right-sided sciatica  M54.41 724.2    G89.29 724.3     338.29   2. Decreased ROM of lumbar spine  M53.86 724.9       Subjective Evaluation    History of Present Illness    Subjective comment: Patient notes that his back is doing better, but is still a little sore.Pain  Current pain ratin           Objective   See Exercise, Manual, and Modality Logs for complete treatment.       Assessment & Plan       Assessment  Assessment details: Patient tolerated today's session well, including re-integration of standing exercises.  Session consisted of manual therapy, therapeutic exercises, ice, and ESTIM.  No adverse reactions were noted with modalities.  Bird dog exercise added at today's session to encourage improved core strength.  Patient reported no increase in pain post-tx.  He will continue to be progressed per his tolerance and POC.          Timed:         Manual Therapy:    13     mins  53854;     Therapeutic Exercise:    29     mins  72970;     Neuromuscular Ruth:        mins  38182;    Therapeutic Activity:          mins  00784;     Gait Training:           mins  84444;     Ultrasound:          mins  36611;    Ionto                                   mins   76249  Self Care                            mins   42085      Un-Timed:  Electrical Stimulation:    10     mins  86696 ( );  Dry Needling          mins self-pay  Traction          mins 41559  Canalith Repos         mins 47527    Timed Treatment:   42   mins   Total Treatment:     52   mins    Jacqui Desai PT  [place] License: [id number]  Electronically signed by Jacqui Desai PT, 24, 12:58 PM EST.

## 2024-03-06 NOTE — PROGRESS NOTES
"Physical Therapy Daily Treatment Note      Patient: Teo Griffith   : 1985  Referring practitioner: Gregorio Thomas DO  Date of Initial Visit: Type: THERAPY  Noted: 2023  Today's Date: 3/6/2024  Patient seen for 56 sessions       Visit Diagnoses:    ICD-10-CM ICD-9-CM   1. S/P arthroscopy of right shoulder  Z98.890 V45.89   2. Decreased ROM of right shoulder  M25.611 719.51       Subjective Evaluation    History of Present Illness    Subjective comment: Patient reports 3/10 shoulder pain today, noting that it is \"achy.\"Pain  Current pain rating: 3         Objective   See Exercise, Manual, and Modality Logs for complete treatment.       Assessment & Plan       Assessment  Assessment details: Therapy session consisted of there ex and therapeutic activities, followed by cryotherapy.  Patient required minimal cues throughout session to correct form.  Patient noted mild discomfort with IR/ER with theraband, but noted that it eased with rest.  He will continue to be progressed per his tolerance and POC.        Timed:         Manual Therapy:         mins  18237;     Therapeutic Exercise:    31     mins  60188;     Neuromuscular Ruth:        mins  86860;    Therapeutic Activity:     12     mins  08441;     Gait Training:           mins  87891;     Ultrasound:          mins  37508;    Ionto                                   mins   59213  Self Care                            mins   11477      Un-Timed:  Electrical Stimulation:         mins  65308 ( );  Dry Needling          mins self-pay  Traction          mins 96151  Canalith Repos         mins 26559  Ice-10 min    Timed Treatment:   43   mins   Total Treatment:     53   mins    Jacqui Desai PT  [place] License: [id number]  Electronically signed by Jacqui Desai PT, 24, 12:53 PM EST.                    "

## 2024-03-11 ENCOUNTER — TREATMENT (OUTPATIENT)
Dept: PHYSICAL THERAPY | Facility: CLINIC | Age: 39
End: 2024-03-11
Payer: OTHER GOVERNMENT

## 2024-03-11 DIAGNOSIS — M25.611 DECREASED ROM OF RIGHT SHOULDER: ICD-10-CM

## 2024-03-11 DIAGNOSIS — Z98.890 S/P ARTHROSCOPY OF RIGHT SHOULDER: Primary | ICD-10-CM

## 2024-03-11 DIAGNOSIS — G89.29 CHRONIC BILATERAL LOW BACK PAIN WITH RIGHT-SIDED SCIATICA: Primary | ICD-10-CM

## 2024-03-11 DIAGNOSIS — M54.41 CHRONIC BILATERAL LOW BACK PAIN WITH RIGHT-SIDED SCIATICA: Primary | ICD-10-CM

## 2024-03-11 DIAGNOSIS — M53.86 DECREASED ROM OF LUMBAR SPINE: ICD-10-CM

## 2024-03-11 PROCEDURE — 97110 THERAPEUTIC EXERCISES: CPT | Performed by: PHYSICAL THERAPIST

## 2024-03-11 PROCEDURE — G0283 ELEC STIM OTHER THAN WOUND: HCPCS | Performed by: PHYSICAL THERAPIST

## 2024-03-11 PROCEDURE — 97140 MANUAL THERAPY 1/> REGIONS: CPT | Performed by: PHYSICAL THERAPIST

## 2024-03-11 NOTE — PROGRESS NOTES
Physical Therapy Daily Treatment Note      Patient: Teo Griffith   : 1985  Referring practitioner: Gregorio Thomas DO  Date of Initial Visit: Type: THERAPY  Noted: 2023  Today's Date: 3/11/2024  Patient seen for 30 sessions       Visit Diagnoses:    ICD-10-CM ICD-9-CM   1. Chronic bilateral low back pain with right-sided sciatica  M54.41 724.2    G89.29 724.3     338.29   2. Decreased ROM of lumbar spine  M53.86 724.9       Subjective Evaluation    History of Present Illness    Subjective comment: Patient reports 3/10 pain at the back, but notes it it is stiff today.Pain  Current pain rating: 3         Objective   See Exercise, Manual, and Modality Logs for complete treatment.       Assessment & Plan       Assessment  Assessment details: Patient tolerated today's session well, noting no increase in pain post-tx.  There ex progressed to include the addition of theraband to PPT with march.  Hypomobility noted at lumbar spine during PA glides, with focus on improving mobility and decreasing pain.  Treatment concluded with ice/ESTIM, with no adverse reactions.  He will continue to be progressed per his tolerance and POC.      Timed:         Manual Therapy:    15     mins  49024;     Therapeutic Exercise:    32     mins  42170;     Neuromuscular Ruth:        mins  55990;    Therapeutic Activity:          mins  49012;     Gait Training:           mins  89086;     Ultrasound:          mins  76236;    Ionto                                   mins   09168  Self Care                            mins   26016      Un-Timed:  Electrical Stimulation:    10     mins  48076 (MC );  Dry Needling          mins self-pay  Traction          mins 72282  Canalith Repos         mins 67155    Timed Treatment:   47   mins   Total Treatment:     57   mins    Jacqui Desai, PT  [place] License: [id number]  Electronically signed by Jacqui Desai, PT, 24, 10:15 AM EDT.

## 2024-03-11 NOTE — PROGRESS NOTES
Physical Therapy Daily Treatment Note      Patient: Teo Griffith   : 1985  Referring practitioner: Gregorio Thomas DO  Date of Initial Visit: Type: THERAPY  Noted: 2023  Today's Date: 3/11/2024  Patient seen for 57 sessions       Visit Diagnoses:    ICD-10-CM ICD-9-CM   1. S/P arthroscopy of right shoulder  Z98.890 V45.89   2. Decreased ROM of right shoulder  M25.611 719.51       Subjective Evaluation    History of Present Illness    Subjective comment: Patient notes that he feels stiff today.Pain  Current pain rating: 3           Objective   See Exercise, Manual, and Modality Logs for complete treatment.       Assessment & Plan       Assessment  Assessment details: Therapy session consisted of there ex and therapeutic activities, followed by cryotherapy.  Exercise progressed to include the addition of standing shoulder flexion and scaption with 3# weight.  Muscle guarding noted during PROM at the right shoulder, primarily nearing end ROM.  Patient reported no change in pain post-tx.  He will continue to be progressed per his tolerance and POC.          Timed:         Manual Therapy:         mins  63901;     Therapeutic Exercise:    32     mins  98178;     Neuromuscular Ruth:        mins  53619;    Therapeutic Activity:      12    mins  31905;     Gait Training:           mins  95020;     Ultrasound:          mins  06128;    Ionto                                   mins   56870  Self Care                            mins   05297      Un-Timed:  Electrical Stimulation:         mins  63453 ( );  Dry Needling          mins self-pay  Traction          mins 46007  Canalith Repos         mins 66083  Ice-10 min    Timed Treatment:   44   mins   Total Treatment:     54   mins    Jacqui Desai PT  [place] License: [id number]  Electronically signed by Jacqui Desai PT, 24, 10:19 AM EDT.

## 2024-03-18 ENCOUNTER — TREATMENT (OUTPATIENT)
Dept: PHYSICAL THERAPY | Facility: CLINIC | Age: 39
End: 2024-03-18
Payer: OTHER GOVERNMENT

## 2024-03-18 DIAGNOSIS — M25.611 DECREASED ROM OF RIGHT SHOULDER: ICD-10-CM

## 2024-03-18 DIAGNOSIS — M53.86 DECREASED ROM OF LUMBAR SPINE: ICD-10-CM

## 2024-03-18 DIAGNOSIS — Z98.890 S/P ARTHROSCOPY OF RIGHT SHOULDER: Primary | ICD-10-CM

## 2024-03-18 DIAGNOSIS — M54.41 CHRONIC BILATERAL LOW BACK PAIN WITH RIGHT-SIDED SCIATICA: Primary | ICD-10-CM

## 2024-03-18 DIAGNOSIS — G89.29 CHRONIC BILATERAL LOW BACK PAIN WITH RIGHT-SIDED SCIATICA: Primary | ICD-10-CM

## 2024-03-18 PROCEDURE — 97140 MANUAL THERAPY 1/> REGIONS: CPT | Performed by: PHYSICAL THERAPIST

## 2024-03-18 PROCEDURE — G0283 ELEC STIM OTHER THAN WOUND: HCPCS | Performed by: PHYSICAL THERAPIST

## 2024-03-18 PROCEDURE — 97110 THERAPEUTIC EXERCISES: CPT | Performed by: PHYSICAL THERAPIST

## 2024-03-18 NOTE — PROGRESS NOTES
Physical Therapy Daily Treatment Note      Patient: Teo Griffith   : 1985  Referring practitioner: Gregorio Thomas DO  Date of Initial Visit: Type: THERAPY  Noted: 2023  Today's Date: 3/18/2024  Patient seen for 31 sessions       Visit Diagnoses:    ICD-10-CM ICD-9-CM   1. Chronic bilateral low back pain with right-sided sciatica  M54.41 724.2    G89.29 724.3     338.29   2. Decreased ROM of lumbar spine  M53.86 724.9       Subjective Evaluation    History of Present Illness    Subjective comment: Patient reports that his back hurts when trying to stand up straight.Pain  Current pain rating: 3         Objective   See Exercise, Manual, and Modality Logs for complete treatment.       Assessment & Plan       Assessment  Assessment details: Therapy session initiated with manual therapy  to the lumbar region, consisting of STM and PA glides.  Hypomobility noted at lumbar spinous processes during joint mobilizations.  Exercises continued to focus on lumbar ROM, core strengthening, LE strengthening, and postural awareness.  Session concluded with ice/ESTIM.  No adverse reactions were noted with modalities.  He will continue to be progressed per his tolerance and POC.      Timed:         Manual Therapy:    15     mins  81516;     Therapeutic Exercise:    30     mins  02879;     Neuromuscular Ruth:        mins  13619;    Therapeutic Activity:          mins  60216;     Gait Training:           mins  94142;     Ultrasound:          mins  35411;    Ionto                                   mins   09393  Self Care                            mins   67689      Un-Timed:  Electrical Stimulation:     10    mins  50413 ( );  Dry Needling          mins self-pay  Traction          mins 29923  Canalith Repos         mins 55085    Timed Treatment:   45   mins   Total Treatment:     55   mins    Jacqui Desai PT  [place] License: [id number]  Electronically signed by Jacqui Desai, AXEL, 24, 11:50 AM  EDT.

## 2024-03-18 NOTE — PROGRESS NOTES
"Physical Therapy Daily Treatment Note      Patient: Teo Griffith   : 1985  Referring practitioner: Gregorio Thomas DO  Date of Initial Visit: Type: THERAPY  Noted: 2023  Today's Date: 3/18/2024  Patient seen for 58 sessions       Visit Diagnoses:    ICD-10-CM ICD-9-CM   1. S/P arthroscopy of right shoulder  Z98.890 V45.89   2. Decreased ROM of right shoulder  M25.611 719.51       Subjective Evaluation    History of Present Illness    Subjective comment: Patient notes that his shoulder is \"achy\" today.Pain  Current pain rating: 3           Objective   See Exercise, Manual, and Modality Logs for complete treatment.       Assessment & Plan       Assessment  Assessment details: Patient tolerated today's session well, noting no increase in pain post-tx.  Treatment consisted of there ex, therapeutic activities, and cryotherapy.  Patient continues to give good effort during treatment sessions.  Exercises continue to focus on scapular stabilization, UE strengthening, and ROM.  Patient will continue to be progressed per his tolerance and POC.          Timed:         Manual Therapy:         mins  92861;     Therapeutic Exercise:    30     mins  98052;     Neuromuscular Ruth:        mins  95517;    Therapeutic Activity:     12     mins  48704;     Gait Training:           mins  53087;     Ultrasound:          mins  44118;    Ionto                                   mins   69394  Self Care                            mins   73676      Un-Timed:  Electrical Stimulation:         mins  38069 ( );  Dry Needling          mins self-pay  Traction          mins 65218  Canalith Repos         mins 58762  Ice-10 min  Timed Treatment:   42   mins   Total Treatment:     52   mins    Jacqui Desai PT  [place] License: [id number]  Electronically signed by Jacqui Desai PT, 24, 11:28 AM EDT.                    "

## 2024-04-01 ENCOUNTER — TREATMENT (OUTPATIENT)
Dept: PHYSICAL THERAPY | Facility: CLINIC | Age: 39
End: 2024-04-01
Payer: OTHER GOVERNMENT

## 2024-04-01 DIAGNOSIS — M25.611 DECREASED ROM OF RIGHT SHOULDER: ICD-10-CM

## 2024-04-01 DIAGNOSIS — M53.86 DECREASED ROM OF LUMBAR SPINE: ICD-10-CM

## 2024-04-01 DIAGNOSIS — G89.29 CHRONIC BILATERAL LOW BACK PAIN WITH RIGHT-SIDED SCIATICA: Primary | ICD-10-CM

## 2024-04-01 DIAGNOSIS — Z98.890 S/P ARTHROSCOPY OF RIGHT SHOULDER: Primary | ICD-10-CM

## 2024-04-01 DIAGNOSIS — M54.41 CHRONIC BILATERAL LOW BACK PAIN WITH RIGHT-SIDED SCIATICA: Primary | ICD-10-CM

## 2024-04-01 PROCEDURE — 97140 MANUAL THERAPY 1/> REGIONS: CPT | Performed by: PHYSICAL THERAPIST

## 2024-04-01 PROCEDURE — 97110 THERAPEUTIC EXERCISES: CPT | Performed by: PHYSICAL THERAPIST

## 2024-04-01 NOTE — PROGRESS NOTES
Physical Therapy Daily Treatment Note      Patient: Teo Griffith   : 1985  Referring practitioner: Gregorio Thomas DO  Date of Initial Visit: Type: THERAPY  Noted: 2023  Today's Date: 2024  Patient seen for 32 sessions       Visit Diagnoses:    ICD-10-CM ICD-9-CM   1. Chronic bilateral low back pain with right-sided sciatica  M54.41 724.2    G89.29 724.3     338.29   2. Decreased ROM of lumbar spine  M53.86 724.9       Subjective Evaluation    History of Present Illness    Subjective comment: Patient mentions that he stepped on a ladder and his left foot has been hurting in the arch ever since, making it difficult to walk.Pain  Current pain ratin         Objective   See Exercise, Manual, and Modality Logs for complete treatment.       Assessment & Plan       Assessment  Assessment details: Therapy session initiated with manual therapy to lumbar region, consisting of STM and PA glides.  Muscle guarding was noted at lumbar paraspinals during manual therapy.  There ex modified at today's session, with patient not performing standing exercises due to left foot pain.  Patient will continue to be progressed per his tolerance and POC.      Timed:         Manual Therapy:    15     mins  36406;     Therapeutic Exercise:    25     mins  25331;     Neuromuscular Ruth:        mins  88185;    Therapeutic Activity:          mins  92811;     Gait Training:           mins  38454;     Ultrasound:          mins  14634;    Ionto                                   mins   11611  Self Care                            mins   33020      Un-Timed:  Electrical Stimulation:         mins  92815 (MC );  Dry Needling          mins self-pay  Traction          mins 08959  Canalith Repos         mins 91280    Timed Treatment:   40   mins   Total Treatment:      40  mins    Jacqui Desai PT  [place] License: [id number]  Electronically signed by Jacqui Desai PT, 24, 2:14 PM EDT.

## 2024-04-01 NOTE — PROGRESS NOTES
"Physical Therapy Daily Treatment Note      Patient: Teo Griffith   : 1985  Referring practitioner: Gregorio Thomas DO  Date of Initial Visit: Type: THERAPY  Noted: 2023  Today's Date: 2024  Patient seen for 59 sessions       Visit Diagnoses:    ICD-10-CM ICD-9-CM   1. S/P arthroscopy of right shoulder  Z98.890 V45.89   2. Decreased ROM of right shoulder  M25.611 719.51       Subjective Evaluation    History of Present Illness    Subjective comment: Patient reports that he has increased pain in the right UE today.  He has also noticed a lot of numbness in the right hand at the 4th and 5th digit.Pain  Current pain ratin         Objective   See Exercise, Manual, and Modality Logs for complete treatment.       Assessment & Plan       Assessment  Assessment details: Therapy session initiated with manual therapy at the right UT, with muscle guarding noted.  Following manual therapy, patient performed there ex and therapeutic activities.  Ulnar nerve flossing added at today's session, to address numbness in right UE; patient reported \"tightness\" in right UE when performing exercise.  Patient will continue to be progressed per his tolerance and POC.        Timed:         Manual Therapy:    14     mins  86476;     Therapeutic Exercise:    27    mins  19383;     Neuromuscular Ruth:        mins  94459;    Therapeutic Activity:     12     mins  90580;     Gait Training:           mins  75007;     Ultrasound:          mins  76819;    Ionto                                   mins   42083  Self Care                            mins   22794      Un-Timed:  Electrical Stimulation:         mins  74913 (MC );  Dry Needling          mins self-pay  Traction          mins 63459  Canalith Repos         mins 61455    Timed Treatment:   53   mins   Total Treatment:     53   mins    Jacqui Desai PT  [place] License: [id number]  Electronically signed by Jacqui Desai PT, 24, 2:05 PM EDT.                    "

## 2024-04-17 ENCOUNTER — TREATMENT (OUTPATIENT)
Dept: PHYSICAL THERAPY | Facility: CLINIC | Age: 39
End: 2024-04-17
Payer: OTHER GOVERNMENT

## 2024-04-17 DIAGNOSIS — G89.29 CHRONIC BILATERAL LOW BACK PAIN WITH RIGHT-SIDED SCIATICA: Primary | ICD-10-CM

## 2024-04-17 DIAGNOSIS — M53.86 DECREASED ROM OF LUMBAR SPINE: ICD-10-CM

## 2024-04-17 DIAGNOSIS — M54.41 CHRONIC BILATERAL LOW BACK PAIN WITH RIGHT-SIDED SCIATICA: Primary | ICD-10-CM

## 2024-04-17 DIAGNOSIS — Z98.890 S/P ARTHROSCOPY OF RIGHT SHOULDER: Primary | ICD-10-CM

## 2024-04-17 DIAGNOSIS — M25.611 DECREASED ROM OF RIGHT SHOULDER: ICD-10-CM

## 2024-04-17 NOTE — PROGRESS NOTES
Physical Therapy Re Certification Of Plan of Care  Patient: Teo Griffith   : 1985  Diagnosis/ICD-10 Code:  S/P arthroscopy of right shoulder [Z98.890]  Referring practitioner: Alexis Sanchez MD  Date of Initial Visit: Type: THERAPY  Noted: 2023  Today's Date: 2024  Patient seen for 60 sessions         Visit Diagnoses:    ICD-10-CM ICD-9-CM   1. S/P arthroscopy of right shoulder  Z98.890 V45.89   2. Decreased ROM of right shoulder  M25.611 719.51         Teo Griffith reports: Patient reports that he had an MRI performed last week and followed-up with MD; he was told he had a biceps tear, labrum tear, and a hole in the rotator cuff.  He will undergo surgery on , pending no changes.  MD recommends for patient to continue with PT, so he does not suffer any loss of ROM or strength.  Subjective Questionnaire: QuickDASH: 40.91% impaired  Clinical Progress: unchanged  Home Program Compliance: Yes      Subjective Evaluation    Pain  Current pain ratin  At best pain ratin  At worst pain ratin           Objective          Active Range of Motion     Right Shoulder   Flexion: 131 degrees   Abduction: 110 degrees   External rotation 90°: 36 degrees  Internal rotation 90°: 40 degrees     Strength/Myotome Testing     Right Shoulder     Planes of Motion   Flexion: 4-   Abduction: 4-   External rotation at 0°: 4-   Internal rotation at 0°: 4     Isolated Muscles   Biceps: 4   Triceps: 4           Assessment & Plan       Assessment  Impairments: abnormal muscle firing, abnormal or restricted ROM, activity intolerance, impaired physical strength, lacks appropriate home exercise program, pain with function and safety issue   Functional limitations: carrying objects, lifting, sleeping, pulling, pushing, uncomfortable because of pain, stooping, reaching behind back and reaching overhead   Assessment details: Patient has been attending therapy for treatment s/p a right shoulder arthroscopy; he  has attended therapy for a total of 60 sessions.  No significant changes are noted in right shoulder since last progress note.  Patient displayed slight improvement with flexion and scaption AROM, but regression with ER/IR AROM.  Weakness continues to be noted at the right shoulder in all directions.  He will continue to benefit from skilled PT so that he can achieve his maximum level of function.  Prognosis: good    Goals  Plan Goals: STG 6 weeks  1 Pt will be instructed in a HEP.  Met  2 Pt will report a 50% decrease in radicular symptoms.  Ongoing, progressing-reports 15% improvement  3 Pt will demonstrate 120 degree of arom shoulder flexion.  Met  Pt will improve his Quick Dash to less than 40%.   Met    LTG 12 weeks  1. Pt will report back pain no greater than 3/10 with moderate lifting.  Ongoing, progressing  2. Pt will demonstrate 4/5 right shoulder gross strength.  Ongoing, progressing  3. Pt will improve his Quick Dash score to less than 15%.  Ongoing, progressing-40.91% impaired  4. Pt will demonstrate 140 degrees of AROM flexion with no distress.  Met      Plan  Therapy options: will be seen for skilled therapy services  Planned modality interventions: cryotherapy, TENS, thermotherapy (hydrocollator packs), traction and ultrasound  Planned therapy interventions: abdominal trunk stabilization, ADL retraining, body mechanics training, flexibility, functional ROM exercises, home exercise program, IADL retraining, joint mobilization, manual therapy, neuromuscular re-education, postural training, soft tissue mobilization, strengthening, stretching and therapeutic activities  Frequency: 2x week  Duration in weeks: 8  Treatment plan discussed with: patient  Plan details: Re-evaluation   48180  Therapeutic exercise  66531  Therapeutic activity    59445  Neuromuscular re-education   11698  Manual therapy   25869  Gait training  37762    Unattended e-stim (Medicaid/Medicare)     Moist heat/cryotherapy 55656    Ultrasound   84477  Mechanical traction 99017           Recommendations: Continue as planned  Timeframe: 2 months  Prognosis to achieve goals: good      Timed:         Manual Therapy:         mins  89844;     Therapeutic Exercise:    30     mins  07162;     Neuromuscular Ruth:        mins  10622;    Therapeutic Activity:     14     mins  64750;     Gait Training:           mins  23327;     Ultrasound:          mins  82202;    Ionto                                   mins   86385  Self Care                            mins   04823    Un-Timed:  Electrical Stimulation:         mins  19702 (MC );  Dry Needling          mins self-pay  Traction          mins 65409  Re-Eval                               mins  27635  Canalith Repos         mins 87665    Timed Treatment:   44   mins   Total Treatment:     44   mins          PT: Jacqui Desai PT     [place] License:  [id number]    Electronically signed by Jacqui Desai PT, 04/17/24, 9:43 AM EDT    Certification Period: 4/17/2024 thru 7/15/2024  I certify that the therapy services are furnished while this patient is under my care.  The services outlined above are required by this patient, and will be reviewed every 90 days.         Physician Signature:__________________________________________________    PHYSICIAN: Alexis Sanchez MD  NPI: [id number]                                      DATE:  :     Please sign and return via fax to [email or web address] . Thank you, [facility]

## 2024-04-17 NOTE — PROGRESS NOTES
Physical Therapy Re Certification Of Plan of Care  Patient: Teo Griffith   : 1985  Diagnosis/ICD-10 Code:  S/P arthroscopy of right shoulder [Z98.890]  Referring practitioner: Alexis Sanchez MD  Date of Initial Visit: Type: THERAPY  Noted: 2023  Today's Date: 2024  Patient seen for 60 sessions            Visit Diagnoses:  Visit Diagnosis       ICD-10-CM ICD-9-CM   1. S/P arthroscopy of right shoulder  Z98.890 V45.89   2. Decreased ROM of right shoulder  M25.611 719.51               Teo Griffith reports: Patient reports that he had an MRI performed last week and followed-up with MD; he was told he had a biceps tear, labrum tear, and a hole in the rotator cuff.  He will undergo surgery on , pending no changes.  MD recommends for patient to continue with PT, so he does not suffer any loss of ROM or strength.  Subjective Questionnaire: QuickDASH: 40.91% impaired  Clinical Progress: unchanged  Home Program Compliance: Yes        Subjective Evaluation     Pain  Current pain ratin  At best pain ratin  At worst pain ratin              Objective            Active Range of Motion      Right Shoulder   Flexion: 131 degrees   Abduction: 110 degrees   External rotation 90°: 36 degrees  Internal rotation 90°: 40 degrees      Strength/Myotome Testing      Right Shoulder      Planes of Motion   Flexion: 4-   Abduction: 4-   External rotation at 0°: 4-   Internal rotation at 0°: 4      Isolated Muscles   Biceps: 4   Triceps: 4             Assessment & Plan         Assessment  Impairments: abnormal muscle firing, abnormal or restricted ROM, activity intolerance, impaired physical strength, lacks appropriate home exercise program, pain with function and safety issue   Functional limitations: carrying objects, lifting, sleeping, pulling, pushing, uncomfortable because of pain, stooping, reaching behind back and reaching overhead   Assessment details: Patient has been attending therapy for  treatment s/p a right shoulder arthroscopy; he has attended therapy for a total of 60 sessions.  No significant changes are noted in right shoulder since last progress note.  Patient displayed slight improvement with flexion and scaption AROM, but regression with ER/IR AROM.  Weakness continues to be noted at the right shoulder in all directions.  He will continue to benefit from skilled PT so that he can achieve his maximum level of function.  Prognosis: good     Goals  Plan Goals: STG 6 weeks  1 Pt will be instructed in a HEP.  Met  2 Pt will report a 50% decrease in radicular symptoms.  Ongoing, progressing-reports 15% improvement  3 Pt will demonstrate 120 degree of arom shoulder flexion.  Met  Pt will improve his Quick Dash to less than 40%.   Met     LTG 12 weeks  1. Pt will report back pain no greater than 3/10 with moderate lifting.  Ongoing, progressing  2. Pt will demonstrate 4/5 right shoulder gross strength.  Ongoing, progressing  3. Pt will improve his Quick Dash score to less than 15%.  Ongoing, progressing-40.91% impaired  4. Pt will demonstrate 140 degrees of AROM flexion with no distress.  Met        Plan  Therapy options: will be seen for skilled therapy services  Planned modality interventions: cryotherapy, TENS, thermotherapy (hydrocollator packs), traction and ultrasound  Planned therapy interventions: abdominal trunk stabilization, ADL retraining, body mechanics training, flexibility, functional ROM exercises, home exercise program, IADL retraining, joint mobilization, manual therapy, neuromuscular re-education, postural training, soft tissue mobilization, strengthening, stretching and therapeutic activities  Frequency: 2x week  Duration in weeks: 8  Treatment plan discussed with: patient  Plan details: Re-evaluation   38336  Therapeutic exercise  23462  Therapeutic activity    00467  Neuromuscular re-education   65934  Manual therapy   28780  Gait training  90041     Unattended e-stim  (Medicaid/Medicare)     Moist heat/cryotherapy 44697   Ultrasound   63075  Mechanical traction 65270                         Recommendations: Continue as planned  Timeframe: 2 months  Prognosis to achieve goals: good        Timed:                                                                           Manual Therapy:                 mins  02119;                      Therapeutic Exercise:    30     mins  79031;     Neuromuscular uRth:        mins  37509;    Therapeutic Activity:      14     mins  66904;     Gait Training:                      mins  55197;     Ultrasound:                          mins  99726;    Ionto                                   mins   64210  Self Care                            mins   66046     Un-Timed:  Electrical Stimulation:         mins  60834 ( );  Dry Needling                       mins self-pay  Traction                               mins 63214  Re-Eval                               mins  21884  Canalith Repos                   mins 91572     Timed Treatment:   44   mins   Total Treatment:     44   mins              PT: Jacqui Desai PT             [place] License:  [id number]     Electronically signed by Jacqui Desai PT, 04/17/24, 9:43 AM EDT     Certification Period: 4/17/2024 thru 7/15/2024  I certify that the therapy services are furnished while this patient is under my care.  The services outlined above are required by this patient, and will be reviewed every 90 days.                      Physician Signature:__________________________________________________     PHYSICIAN: Alexis Sanchez MD  NPI: [id number]                                      DATE:  :      Please sign and return via fax to [email or web address] . Thank you, [facility] Physical Therapy                        Problem List     None   Medical History     None   Surgical History     None   Social History   4 items  Smoking Status  Never  Smokeless Tobacco Status  Current; Types: Snuff  Alcohol  Use  Yes  Drug Use  No

## 2024-04-17 NOTE — PROGRESS NOTES
Physical Therapy Re Certification Of Plan of Care  Patient: Teo Griffith   : 1985  Diagnosis/ICD-10 Code:  Chronic bilateral low back pain with right-sided sciatica [M54.41, G89.29]  Referring practitioner: No ref. provider found  Date of Initial Visit: Type: THERAPY  Noted: 2023  Today's Date: 2024  Patient seen for 33 sessions         Visit Diagnoses:    ICD-10-CM ICD-9-CM   1. Chronic bilateral low back pain with right-sided sciatica  M54.41 724.2    G89.29 724.3     338.29   2. Decreased ROM of lumbar spine  M53.86 724.9       Subjective Questionnaire: Oswestry:   Clinical Progress: improved  Home Program Compliance: Yes    Subjective Evaluation    History of Present Illness    Subjective comment: Patient reports that his back is hurting a lot today.  He notes that he shoulder have another epidural scheduled soon and hopes that will help with his pain.Pain  Current pain ratin  At best pain rating: 3  At worst pain ratin             Objective          Active Range of Motion     Lumbar   Flexion: Active lumbar flexion: 50%   Extension: Active lumbar extension: 10%   Left rotation: Active left lumbar rotation: 50%   Right rotation: Active right lumbar rotation: 25%     Strength/Myotome Testing     Left Hip   Planes of Motion   Flexion: 4  Abduction: 4  Adduction: 4-    Right Hip   Planes of Motion   Flexion: 4-  Abduction: 4-  Adduction: 4-    Left Knee   Flexion: 4-  Extension: 4-    Right Knee   Flexion: 4+  Extension: 4+          Assessment & Plan       Assessment  Impairments: abnormal gait, abnormal muscle firing, abnormal or restricted ROM, activity intolerance, impaired physical strength, lacks appropriate home exercise program and pain with function   Functional limitations: lifting, sleeping, pulling, pushing, uncomfortable because of pain, moving in bed, sitting, standing, stooping and unable to perform repetitive tasks   Assessment details: Patient has been attending  outpatient physical therapy for low back pain; he has attended therapy for a total of 33 sessions.  Patient has shown slight improvement in lumbar extension AROM, with patient currently displaying 10%; however, he displays a decrease in right lumbar rotation AROM.  Patient has shown improvements in LE strength, but continues to have greater deficits in right LE.  He currently reports a 50% functional mobility impairment, based on his response to the Modified Oswestry.  Patient will continue to benefit from skilled PT so that he can achieve his maximum level of function.  Prognosis: good    Goals  Plan Goals: SHORT TERM GOALS:     4 weeks  1. Patient will be independent/compliant with HEP.  Met  2. Patient will report pain no greater than 3/10 when performing self-care activities.  Met  3. Patient will report pain no greater than 3/10 when sitting for 1 hour to travel to appointments.  Ongoing, progressing  4. Patient will be educated in proper lifting mechanics for improved back safety and be able to demonstrate on 4/5 trials.  Met    LONG TERM GOALS:   8 weeks  1. Patient will show a 25% improvement of lumbar AROM to show improved ability to perform functional activities.  Ongoing, progressing  2. Bilateral LE strength will improve to at least 4+/5 to allow for greater ease with daily tasks.  Ongoing, progressing  3. Patient to report less than 30% impairment on the Modified Oswestry for improved functional mobility.  Ongoing-50% impaired  4. Pt to report 50% decrease of pain/numbness/tingling into the right leg to show decreased nerve compression.  Met  5. Patient will be able to lift and carry 20# with proper body mechanics and no increase in back pain to allow for greater ease with daily tasks.  Ongoing, progressing-reports increased hip and back pain      Plan  Therapy options: will be seen for skilled therapy services  Planned modality interventions: cryotherapy, TENS, electrical stimulation/Croatian  stimulation, thermotherapy (hydrocollator packs), ultrasound and traction  Planned therapy interventions: manual therapy, ADL retraining, balance/weight-bearing training, neuromuscular re-education, body mechanics training, postural training, soft tissue mobilization, flexibility, spinal/joint mobilization, functional ROM exercises, strengthening, gait training, stretching, home exercise program, therapeutic activities, IADL retraining, wheelchair management/propulsion training and joint mobilization  Frequency: 2x week  Duration in weeks: 8  Treatment plan discussed with: patient  Plan details: Re-evaluation   58581    Therapeutic exercise  20415  Therapeutic activity    27221  Neuromuscular re-education   45438  Manual therapy   68745  Gait training  15544    Unattended e-stim (Private)  87442  Unattended e-stim (Medicaid/Medicare)     Moist heat/cryotherapy 10347   Ultrasound   75736  Mechanical traction 25265           Recommendations: Continue as planned  Timeframe: 2 months  Prognosis to achieve goals: good      Timed:         Manual Therapy:         mins  48670;     Therapeutic Exercise:         mins  45475;     Neuromuscular Ruth:        mins  94853;    Therapeutic Activity:          mins  77646;     Gait Training:           mins  00604;     Ultrasound:          mins  69087;    Ionto                                   mins   32507  Self Care                            mins   52682    Un-Timed:  Electrical Stimulation:         mins  79183 ( );  Dry Needling          mins self-pay  Traction          mins 66527  Re-Eval                               mins  77569  Canalith Repos         mins 97661    Timed Treatment:      mins   Total Treatment:     5   mins          PT: Jacqui Desai PT     [place] License:  [id number]    Electronically signed by Jcaqui Desai PT, 04/17/24, 10:13 AM EDT    Certification Period: 4/17/2024 thru 7/15/2024  I certify that the therapy services are furnished while this  patient is under my care.  The services outlined above are required by this patient, and will be reviewed every 90 days.         Physician Signature:__________________________________________________    PHYSICIAN:   NPI:                                       DATE:  :     Please sign and return via fax to [email or web address] . Thank you, [facility]

## 2024-04-17 NOTE — PROGRESS NOTES
Physical Therapy Re Certification Of Plan of Care  Patient: Teo Griffith   : 1985  Diagnosis/ICD-10 Code:  Chronic bilateral low back pain with right-sided sciatica [M54.41, G89.29]  Referring practitioner: No ref. provider found  Date of Initial Visit: Type: THERAPY  Noted: 2023  Today's Date: 2024  Patient seen for 33 sessions            Visit Diagnoses:  Visit Diagnosis       ICD-10-CM ICD-9-CM   1. Chronic bilateral low back pain with right-sided sciatica  M54.41 724.2     G89.29 724.3       338.29   2. Decreased ROM of lumbar spine  M53.86 724.9            Subjective Questionnaire: Oswestry:   Clinical Progress: improved  Home Program Compliance: Yes     Subjective Evaluation     History of Present Illness     Subjective comment: Patient reports that his back is hurting a lot today.  He notes that he shoulder have another epidural scheduled soon and hopes that will help with his pain.Pain  Current pain ratin  At best pain rating: 3  At worst pain ratin                 Objective            Active Range of Motion      Lumbar   Flexion: Active lumbar flexion: 50%   Extension: Active lumbar extension: 10%   Left rotation: Active left lumbar rotation: 50%   Right rotation: Active right lumbar rotation: 25%      Strength/Myotome Testing      Left Hip   Planes of Motion   Flexion: 4  Abduction: 4  Adduction: 4-     Right Hip   Planes of Motion   Flexion: 4-  Abduction: 4-  Adduction: 4-     Left Knee   Flexion: 4-  Extension: 4-     Right Knee   Flexion: 4+  Extension: 4+            Assessment & Plan         Assessment  Impairments: abnormal gait, abnormal muscle firing, abnormal or restricted ROM, activity intolerance, impaired physical strength, lacks appropriate home exercise program and pain with function   Functional limitations: lifting, sleeping, pulling, pushing, uncomfortable because of pain, moving in bed, sitting, standing, stooping and unable to perform repetitive tasks    Assessment details: Patient has been attending outpatient physical therapy for low back pain; he has attended therapy for a total of 33 sessions.  Patient has shown slight improvement in lumbar extension AROM, with patient currently displaying 10%; however, he displays a decrease in right lumbar rotation AROM.  Patient has shown improvements in LE strength, but continues to have greater deficits in right LE.  He currently reports a 50% functional mobility impairment, based on his response to the Modified Oswestry.  Patient will continue to benefit from skilled PT so that he can achieve his maximum level of function.  Prognosis: good     Goals  Plan Goals: SHORT TERM GOALS:     4 weeks  1. Patient will be independent/compliant with HEP.  Met  2. Patient will report pain no greater than 3/10 when performing self-care activities.  Met  3. Patient will report pain no greater than 3/10 when sitting for 1 hour to travel to appointments.  Ongoing, progressing  4. Patient will be educated in proper lifting mechanics for improved back safety and be able to demonstrate on 4/5 trials.  Met     LONG TERM GOALS:   8 weeks  1. Patient will show a 25% improvement of lumbar AROM to show improved ability to perform functional activities.  Ongoing, progressing  2. Bilateral LE strength will improve to at least 4+/5 to allow for greater ease with daily tasks.  Ongoing, progressing  3. Patient to report less than 30% impairment on the Modified Oswestry for improved functional mobility.  Ongoing-50% impaired  4. Pt to report 50% decrease of pain/numbness/tingling into the right leg to show decreased nerve compression.  Met  5. Patient will be able to lift and carry 20# with proper body mechanics and no increase in back pain to allow for greater ease with daily tasks.  Ongoing, progressing-reports increased hip and back pain        Plan  Therapy options: will be seen for skilled therapy services  Planned modality interventions:  cryotherapy, TENS, electrical stimulation/Russian stimulation, thermotherapy (hydrocollator packs), ultrasound and traction  Planned therapy interventions: manual therapy, ADL retraining, balance/weight-bearing training, neuromuscular re-education, body mechanics training, postural training, soft tissue mobilization, flexibility, spinal/joint mobilization, functional ROM exercises, strengthening, gait training, stretching, home exercise program, therapeutic activities, IADL retraining, wheelchair management/propulsion training and joint mobilization  Frequency: 2x week  Duration in weeks: 8  Treatment plan discussed with: patient  Plan details: Re-evaluation   84934     Therapeutic exercise  14561  Therapeutic activity    04390  Neuromuscular re-education   69841  Manual therapy   87924  Gait training  00449     Unattended e-stim (Private)  11306  Unattended e-stim (Medicaid/Medicare)     Moist heat/cryotherapy 13129   Ultrasound   84931  Mechanical traction 22743                         Recommendations: Continue as planned  Timeframe: 2 months  Prognosis to achieve goals: good        Timed:                                                                           Manual Therapy:                 mins  47579;                      Therapeutic Exercise:         mins  87300;     Neuromuscular Ruth:        mins  99515;    Therapeutic Activity:           mins  53058;     Gait Training:                      mins  58776;     Ultrasound:                          mins  57891;    Ionto                                   mins   99179  Self Care                            mins   41056     Un-Timed:  Electrical Stimulation:         mins  26677 ( );  Dry Needling                       mins self-pay  Traction                               mins 69273  Re-Eval                               mins  10503  Canalith Repos                   mins 23816     Timed Treatment:      mins   Total Treatment:     5   mins               PT: Jacqui Desai, PT             [place] License:  [id number]     Electronically signed by Jacqui Desai, PT, 04/17/24, 10:13 AM EDT     Certification Period: 4/17/2024 thru 7/15/2024  I certify that the therapy services are furnished while this patient is under my care.  The services outlined above are required by this patient, and will be reviewed every 90 days.                      Physician Signature:__________________________________________________     PHYSICIAN:   NPI:                                       DATE:  :      Please sign and return via fax to [email or web address] . Thank you, [facility]                       Instructions      Return for Next scheduled follow up.  Additional Documentation    Flowsheets: Outbreak Screen   Encounter Info: Billing Info,     History,     Allergies,     Detailed Report     Communications    View All Conversations on this Encounter  Routing History    Priority Sent On From To Message Type    4/15/2024  7:19 AM Mychart, Generic Mychart, Generic Msgs Sent To Patients    4/10/2024  7:46 AM Mychart, Generic Mychart, Generic Msgs Sent To Patients    3/27/2024  8:19 AM Mychart, Generic Mychart, Generic Msgs Sent To Patients     Created by

## 2024-05-01 ENCOUNTER — TREATMENT (OUTPATIENT)
Dept: PHYSICAL THERAPY | Facility: CLINIC | Age: 39
End: 2024-05-01
Payer: OTHER GOVERNMENT

## 2024-05-01 DIAGNOSIS — M54.41 CHRONIC BILATERAL LOW BACK PAIN WITH RIGHT-SIDED SCIATICA: Primary | ICD-10-CM

## 2024-05-01 DIAGNOSIS — G89.29 CHRONIC BILATERAL LOW BACK PAIN WITH RIGHT-SIDED SCIATICA: Primary | ICD-10-CM

## 2024-05-01 DIAGNOSIS — Z98.890 S/P ARTHROSCOPY OF RIGHT SHOULDER: Primary | ICD-10-CM

## 2024-05-01 DIAGNOSIS — M25.611 DECREASED ROM OF RIGHT SHOULDER: ICD-10-CM

## 2024-05-01 DIAGNOSIS — M53.86 DECREASED ROM OF LUMBAR SPINE: ICD-10-CM

## 2024-05-01 PROCEDURE — 97110 THERAPEUTIC EXERCISES: CPT | Performed by: PHYSICAL THERAPIST

## 2024-05-01 PROCEDURE — 97140 MANUAL THERAPY 1/> REGIONS: CPT | Performed by: PHYSICAL THERAPIST

## 2024-05-01 PROCEDURE — 97530 THERAPEUTIC ACTIVITIES: CPT | Performed by: PHYSICAL THERAPIST

## 2024-05-01 NOTE — PROGRESS NOTES
Physical Therapy Daily Treatment Note      Patient: Teo Griffith   : 1985  Referring practitioner: Gregorio Thomas DO  Date of Initial Visit: Type: THERAPY  Noted: 2023  Today's Date: 2024  Patient seen for 61 sessions       Visit Diagnoses:    ICD-10-CM ICD-9-CM   1. S/P arthroscopy of right shoulder  Z98.890 V45.89   2. Decreased ROM of right shoulder  M25.611 719.51       Subjective Evaluation    History of Present Illness    Subjective comment: Patient reports 3/10 pain in the shoulder today.  He requests to shorten today's session, due to another MD appt.Pain  Current pain rating: 3           Objective   See Exercise, Manual, and Modality Logs for complete treatment.       Assessment & Plan       Assessment  Assessment details: Treatment session shortened, per patient's request.  Treatment consisted of there ex and therapeutic activities.  Exercises focused on shoulder ROM, scapular stabilization, and UE strengthening.  PROM was performed per patient's tolerance to encourage improved mobility.  Patient continues to give good effort throughout treatment.  He will continue to be progressed per his tolerance and POC.        Timed:         Manual Therapy:         mins  36769;     Therapeutic Exercise:    19     mins  38737;     Neuromuscular Ruth:        mins  68388;    Therapeutic Activity:     13     mins  66159;     Gait Training:           mins  24157;     Ultrasound:          mins  76409;    Ionto                                   mins   98964  Self Care                            mins   69775      Un-Timed:  Electrical Stimulation:         mins  16807 ( );  Dry Needling          mins self-pay  Traction          mins 84625  Canalith Repos         mins 07260    Timed Treatment:    32  mins   Total Treatment:    32    mins    Jacqui Desai PT  [place] License: [id number]  Electronically signed by Jacqui Desai PT, 24, 12:01 PM EDT.

## 2024-05-01 NOTE — PROGRESS NOTES
Physical Therapy Daily Treatment Note      Patient: Teo Griffith   : 1985  Referring practitioner: Alexis Sanchez MD  Date of Initial Visit: Type: THERAPY  Noted: 2023  Today's Date: 2024  Patient seen for 34 sessions       Visit Diagnoses:    ICD-10-CM ICD-9-CM   1. Chronic bilateral low back pain with right-sided sciatica  M54.41 724.2    G89.29 724.3     338.29   2. Decreased ROM of lumbar spine  M53.86 724.9       Subjective Evaluation    History of Present Illness    Subjective comment: Patient reports that he has 3/10 pain today.  He mentions that he had his epidural injection performed last week.Pain  Current pain rating: 3           Objective   See Exercise, Manual, and Modality Logs for complete treatment.       Assessment & Plan       Assessment  Assessment details: Patient tolerated today's session well, noting no increase in pain post-tx.  Therapy session initiated with manual therapy to the lumbar region, consisting of PA glides and STM.  Decreased muscle guarding was noted at lumbar paraspinals at today's session.  There ex focused on LE strength, stabilization, and core strength.  Minimal cues required to correct form or posture during exercises.  He will continue to be progressed per his tolerance and POC.          Timed:         Manual Therapy:    13     mins  16289;     Therapeutic Exercise:    21     mins  40644;     Neuromuscular Ruth:        mins  14331;    Therapeutic Activity:          mins  54566;     Gait Training:           mins  24883;     Ultrasound:          mins  14809;    Ionto                                   mins   87292  Self Care                            mins   18115      Un-Timed:  Electrical Stimulation:         mins  90521 ( );  Dry Needling          mins self-pay  Traction          mins 90118  Canalith Repos         mins 60215    Timed Treatment:    34  mins   Total Treatment:     34   mins    Jacqui Desai, PT  [place] License:  [id number]  Electronically signed by Jacqui Desai, PT, 05/01/24, 11:08 AM EDT.

## 2024-05-15 ENCOUNTER — TREATMENT (OUTPATIENT)
Dept: PHYSICAL THERAPY | Facility: CLINIC | Age: 39
End: 2024-05-15
Payer: OTHER GOVERNMENT

## 2024-05-15 DIAGNOSIS — Z98.890 S/P ARTHROSCOPY OF RIGHT SHOULDER: Primary | ICD-10-CM

## 2024-05-15 DIAGNOSIS — G89.29 CHRONIC BILATERAL LOW BACK PAIN WITH RIGHT-SIDED SCIATICA: Primary | ICD-10-CM

## 2024-05-15 DIAGNOSIS — M53.86 DECREASED ROM OF LUMBAR SPINE: ICD-10-CM

## 2024-05-15 DIAGNOSIS — M54.41 CHRONIC BILATERAL LOW BACK PAIN WITH RIGHT-SIDED SCIATICA: Primary | ICD-10-CM

## 2024-05-15 DIAGNOSIS — M25.611 DECREASED ROM OF RIGHT SHOULDER: ICD-10-CM

## 2024-05-15 PROCEDURE — 97140 MANUAL THERAPY 1/> REGIONS: CPT | Performed by: PHYSICAL THERAPIST

## 2024-05-15 PROCEDURE — 97110 THERAPEUTIC EXERCISES: CPT | Performed by: PHYSICAL THERAPIST

## 2024-05-15 NOTE — PROGRESS NOTES
Physical Therapy Daily Treatment Note      Patient: Teo Griffith   : 1985  Referring practitioner: Alexis Sanchez MD  Date of Initial Visit: Type: THERAPY  Noted: 2023  Today's Date: 5/15/2024  Patient seen for 62 sessions       Visit Diagnoses:    ICD-10-CM ICD-9-CM   1. S/P arthroscopy of right shoulder  Z98.890 V45.89   2. Decreased ROM of right shoulder  M25.611 719.51       Subjective Evaluation    History of Present Illness    Subjective comment: Patient notes that his shoulder surgery has been approved, so he is calling the surgeon to see if the surgery could be performed sooner.  He has also noticed more numbness in the right UE recently.Pain  Current pain rating: 3         Objective   See Exercise, Manual, and Modality Logs for complete treatment.       Assessment & Plan       Assessment  Assessment details: Patient tolerated today's session well, with no increase in pain post-tx.  Treatment consisted of there ex and therapeutic activities, per flow sheet.  Patient required minimal cues for correction of form and posture.  He will continue to be progressed per his tolerance and POC.        Timed:         Manual Therapy:         mins  92262;     Therapeutic Exercise:    26     mins  78892;     Neuromuscular Ruth:        mins  22790;    Therapeutic Activity:     14     mins  45672;     Gait Training:           mins  97198;     Ultrasound:          mins  50519;    Ionto                                   mins   80815  Self Care                            mins   37823      Un-Timed:  Electrical Stimulation:         mins  89408 ( );  Dry Needling          mins self-pay  Traction          mins 21169  Canalith Repos         mins 37826    Timed Treatment:   40   mins   Total Treatment:     40   mins    Jacqui Desai PT  [place] License: [id number]  Electronically signed by Jacqui Desai PT, 05/15/24, 1:03 PM EDT.

## 2024-05-15 NOTE — PROGRESS NOTES
Physical Therapy Daily Treatment Note      Patient: Teo Griffith   : 1985  Referring practitioner: Alexis Sanchez MD  Date of Initial Visit: Type: THERAPY  Noted: 2023  Today's Date: 5/15/2024  Patient seen for 35 sessions       Visit Diagnoses:    ICD-10-CM ICD-9-CM   1. Chronic bilateral low back pain with right-sided sciatica  M54.41 724.2    G89.29 724.3     338.29   2. Decreased ROM of lumbar spine  M53.86 724.9       Subjective Evaluation    History of Present Illness    Subjective comment: Patient notes that he has 3/10 pain in the back.Pain  Current pain rating: 3         Objective   See Exercise, Manual, and Modality Logs for complete treatment.       Assessment & Plan       Assessment  Assessment details: Therapy session initiated with STM and PA glides to lumbar region.  Following manual therapy, patient performed there ex.  Exercises focused on core strengthening, LE strengthening, and stabilization.  Patient gave good effort during treatment session.  He noted no increase in pain post-tx.  He will continue to be progressed per his tolerance and POC.      Timed:         Manual Therapy:    15     mins  32612;     Therapeutic Exercise:    20     mins  78539;     Neuromuscular Ruth:        mins  76346;    Therapeutic Activity:          mins  03599;     Gait Training:           mins  22308;     Ultrasound:          mins  31403;    Ionto                                   mins   54186  Self Care                            mins   07359      Un-Timed:  Electrical Stimulation:         mins  67099 ( );  Dry Needling          mins self-pay  Traction          mins 89625  Canalith Repos         mins 24821    Timed Treatment:   35   mins   Total Treatment:     35   mins    Jacqui Desai, PT  [place] License: [id number]

## 2024-05-22 ENCOUNTER — TREATMENT (OUTPATIENT)
Dept: PHYSICAL THERAPY | Facility: CLINIC | Age: 39
End: 2024-05-22
Payer: OTHER GOVERNMENT

## 2024-05-22 DIAGNOSIS — Z98.890 S/P ARTHROSCOPY OF RIGHT SHOULDER: Primary | ICD-10-CM

## 2024-05-22 DIAGNOSIS — M53.86 DECREASED ROM OF LUMBAR SPINE: ICD-10-CM

## 2024-05-22 DIAGNOSIS — M54.41 CHRONIC BILATERAL LOW BACK PAIN WITH RIGHT-SIDED SCIATICA: Primary | ICD-10-CM

## 2024-05-22 DIAGNOSIS — M25.611 DECREASED ROM OF RIGHT SHOULDER: ICD-10-CM

## 2024-05-22 DIAGNOSIS — G89.29 CHRONIC BILATERAL LOW BACK PAIN WITH RIGHT-SIDED SCIATICA: Primary | ICD-10-CM

## 2024-05-22 PROCEDURE — 97140 MANUAL THERAPY 1/> REGIONS: CPT | Performed by: PHYSICAL THERAPIST

## 2024-05-22 PROCEDURE — G0283 ELEC STIM OTHER THAN WOUND: HCPCS | Performed by: PHYSICAL THERAPIST

## 2024-05-22 PROCEDURE — 97110 THERAPEUTIC EXERCISES: CPT | Performed by: PHYSICAL THERAPIST

## 2024-05-22 NOTE — PROGRESS NOTES
Physical Therapy Progress Note  Patient: Teo Griffith   : 1985  Diagnosis/ICD-10 Code:  Chronic bilateral low back pain with right-sided sciatica [M54.41, G89.29]  Referring practitioner: Alexis Sanchez MD  Date of Initial Visit: Type: THERAPY  Noted: 2023  Today's Date: 2024  Patient seen for 36 sessions         Visit Diagnoses:    ICD-10-CM ICD-9-CM   1. Chronic bilateral low back pain with right-sided sciatica  M54.41 724.2    G89.29 724.3     338.29   2. Decreased ROM of lumbar spine  M53.86 724.9         Teo Griffith reports: His back is bothering him a lot today and he is unsure what is causing the flare-up.  He notes that pain is primarily in the back today.  Subjective Questionnaire: Modified Oswestry:   Clinical Progress: unchanged  Home Program Compliance: Yes      Subjective Evaluation    Pain  Current pain ratin  At best pain ratin  At worst pain ratin           Objective          Active Range of Motion     Lumbar   Flexion: Active lumbar flexion: 25%   Extension: Active lumbar extension: 10%   Left rotation: Active left lumbar rotation: 50%   Right rotation: Active right lumbar rotation: 50%     Strength/Myotome Testing     Left Hip   Planes of Motion   Flexion: 4  Abduction: 4  Adduction: 4-    Right Hip   Planes of Motion   Flexion: 4-  Abduction: 4  Adduction: 4-    Left Knee   Flexion: 4+  Extension: 4+    Right Knee   Flexion: 4  Extension: 4-        Assessment & Plan       Assessment  Impairments: abnormal gait, abnormal muscle firing, abnormal or restricted ROM, activity intolerance, impaired physical strength, lacks appropriate home exercise program and pain with function   Functional limitations: lifting, sleeping, pulling, pushing, uncomfortable because of pain, moving in bed, sitting, standing, stooping and unable to perform repetitive tasks   Assessment details: Patient has been attending outpatient physical therapy for low back pain.  No  sigifnicant improvements are noted since last progress note, with patient continuing to show lumbar ROM deficits.  Greater strength deficits are noted in the right LE than left LE.  He currently reports a 52% functional mobility impairment, based on his response to the Modified Oswestry.  Patient will continue to benefit from skilled PT so that he can achieve his maximum level of function.  Prognosis: good    Goals  Plan Goals: SHORT TERM GOALS:     4 weeks  1. Patient will be independent/compliant with HEP.  Met  2. Patient will report pain no greater than 3/10 when performing self-care activities.  Met  3. Patient will report pain no greater than 3/10 when sitting for 1 hour to travel to appointments.  Ongoing, progressing  4. Patient will be educated in proper lifting mechanics for improved back safety and be able to demonstrate on 4/5 trials.  Met    LONG TERM GOALS:   8 weeks  1. Patient will show a 25% improvement of lumbar AROM to show improved ability to perform functional activities.  Ongoing, progressing  2. Bilateral LE strength will improve to at least 4+/5 to allow for greater ease with daily tasks.  Ongoing, progressing  3. Patient to report less than 30% impairment on the Modified Oswestry for improved functional mobility.  Ongoing  4. Pt to report 50% decrease of pain/numbness/tingling into the right leg to show decreased nerve compression.  Met  5. Patient will be able to lift and carry 20# with proper body mechanics and no increase in back pain to allow for greater ease with daily tasks.  Ongoing, progressing-reports increased hip and back pain      Plan  Therapy options: will be seen for skilled therapy services  Planned modality interventions: cryotherapy, TENS, electrical stimulation/Russian stimulation, thermotherapy (hydrocollator packs), ultrasound and traction  Planned therapy interventions: manual therapy, ADL retraining, balance/weight-bearing training, neuromuscular re-education, body  mechanics training, postural training, soft tissue mobilization, flexibility, spinal/joint mobilization, functional ROM exercises, strengthening, gait training, stretching, home exercise program, therapeutic activities, IADL retraining, wheelchair management/propulsion training and joint mobilization  Frequency: 1x week  Duration in weeks: 4  Treatment plan discussed with: patient  Plan details: Re-evaluation   87927    Therapeutic exercise  00108  Therapeutic activity    66497  Neuromuscular re-education   19593  Manual therapy   80626  Gait training  22323    Unattended e-stim (Private)  41313  Unattended e-stim (Medicaid/Medicare)     Moist heat/cryotherapy 54368   Ultrasound   72407  Mechanical traction 99237         Recommendations: Continue as planned  Timeframe: 1 month  Prognosis to achieve goals: good      Timed:         Manual Therapy:    15     mins  21338;     Therapeutic Exercise:    25     mins  61077;     Neuromuscular Ruth:        mins  37348;    Therapeutic Activity:          mins  47210;     Gait Training:           mins  80009;     Ultrasound:          mins  43564;    Ionto                                   mins   84199  Self Care                            mins   63955    Un-Timed:  Electrical Stimulation:    10     mins  65998 ( );  Dry Needling          mins self-pay  Traction          mins 23912  Re-Eval                               mins  14310  Canalith Repos         mins 53982    Timed Treatment:   40   mins   Total Treatment:      50  mins          PT: Jacqui Desai PT     [place] License:  [id number]    Electronically signed by Jacqui Desai PT, 05/22/24, 10:09 AM EDT    Certification Period: 5/22/2024 thru 8/19/2024  I certify that the therapy services are furnished while this patient is under my care.  The services outlined above are required by this patient, and will be reviewed every 90 days.         Physician  Signature:__________________________________________________    PHYSICIAN: Alexis Sanchez MD  NPI: [id number]                                      DATE:  :     Please sign and return via fax to [email or web address] . Thank you, [facility]

## 2024-05-22 NOTE — PROGRESS NOTES
Physical Therapy Progress Note  Patient: Teo Griffith   : 1985  Diagnosis/ICD-10 Code:  S/P arthroscopy of right shoulder [Z98.890]  Referring practitioner: Alexis Sanchez MD  Date of Initial Visit: Type: THERAPY  Noted: 2023  Today's Date: 2024  Patient seen for 63 sessions         Visit Diagnoses:    ICD-10-CM ICD-9-CM   1. S/P arthroscopy of right shoulder  Z98.890 V45.89   2. Decreased ROM of right shoulder  M25.611 719.51         Teo Griffith reports: He would like to not perform treatment on shoulder today, in order to help save therapy visits for after he has surgery.  He notes that his shoulder surgery has been scheduled and is in approximately 3 weeks, but can not remember the exact day.  He notes that he continues to experience numbness/tingling in the right UE and plans to discuss it with MD.  Patient reports no changes with the shoulder.  Subjective Questionnaire: QuickDASH: 43.18% impaired  Clinical Progress: unchanged  Home Program Compliance: Yes      Subjective Evaluation    Pain  Current pain ratin  At best pain rating: 3  At worst pain ratin           Objective          Active Range of Motion     Right Shoulder   Flexion: 126 degrees   Abduction: 114 degrees   External rotation BTH: Active external rotation behind the head: back of head.   Internal rotation BTB: Active internal rotation behind the back: waistline.     Strength/Myotome Testing     Right Shoulder     Planes of Motion   Flexion: 4-   Abduction: 4-   External rotation at 0°: 4-   Internal rotation at 0°: 4     Isolated Muscles   Biceps: 4   Triceps: 4         Assessment & Plan       Assessment  Impairments: abnormal muscle firing, abnormal or restricted ROM, activity intolerance, impaired physical strength, lacks appropriate home exercise program, pain with function and safety issue   Functional limitations: carrying objects, lifting, sleeping, pulling, pushing, uncomfortable because of pain,  stooping, reaching behind back and reaching overhead   Assessment details: Patient has been attending therapy for treatment s/p a right shoulder arthroscopy.  Patient continues to provide good effort with PT and shows good attendance.  Patient continues to have ROM and strength deficits in the right UE.  He currently reports a functional mobility impairment of 43.18% on the QuickDash.  He will continue to benefit from skilled PT so that he can achieve his maximum level of function.  Prognosis: good    Goals  Plan Goals: STG 6 weeks  1 Pt will be instructed in a HEP.  Met  2 Pt will report a 50% decrease in radicular symptoms.  Ongoing, progressing  3 Pt will demonstrate 120 degree of arom shoulder flexion.  Met  Pt will improve his Quick Dash to less than 40%.   Met    LTG 12 weeks  1. Pt will report back pain no greater than 3/10 with moderate lifting.  Ongoing, progressing  2. Pt will demonstrate 4/5 right shoulder gross strength.  Ongoing, progressing  3. Pt will improve his Quick Dash score to less than 15%.  Ongoing, progressing  4. Pt will demonstrate 140 degrees of AROM flexion with no distress.  Met      Plan  Therapy options: will be seen for skilled therapy services  Planned modality interventions: cryotherapy, TENS, thermotherapy (hydrocollator packs), traction and ultrasound  Planned therapy interventions: abdominal trunk stabilization, ADL retraining, body mechanics training, flexibility, functional ROM exercises, home exercise program, IADL retraining, joint mobilization, manual therapy, neuromuscular re-education, postural training, soft tissue mobilization, strengthening, stretching and therapeutic activities  Frequency: 1x week  Duration in weeks: 4  Treatment plan discussed with: patient  Plan details: Re-evaluation   43600  Therapeutic exercise  23107  Therapeutic activity    36024  Neuromuscular re-education   50762  Manual therapy   20129  Gait training  83446    Unattended e-stim  (Medicaid/Medicare)     Moist heat/cryotherapy 89279   Ultrasound   87249  Mechanical traction 79974           Recommendations: Continue as planned  Timeframe: 1 month  Prognosis to achieve goals: good      Timed:         Manual Therapy:         mins  91795;     Therapeutic Exercise:         mins  91367;     Neuromuscular Ruth:        mins  84118;    Therapeutic Activity:          mins  00394;     Gait Training:           mins  86083;     Ultrasound:          mins  31935;    Ionto                                   mins   09973  Self Care                            mins   12856    Un-Timed:  Electrical Stimulation:         mins  22985 ( );  Dry Needling          mins self-pay  Traction          mins 79931  Re-Eval                               mins  50612  Canalith Repos         mins 92248    Timed Treatment:      mins   Total Treatment:        mins          PT: Jacqui Desai PT     [place] License:  [id number]    Electronically signed by Jacqui Desai PT, 05/22/24, 10:11 AM EDT    Certification Period: 5/22/2024 thru 8/19/2024  I certify that the therapy services are furnished while this patient is under my care.  The services outlined above are required by this patient, and will be reviewed every 90 days.         Physician Signature:__________________________________________________    PHYSICIAN: Alexis Sanchez MD  NPI: [id number]                                      DATE:  :     Please sign and return via fax to [email or web address] . Thank you, [facility]

## 2024-05-29 ENCOUNTER — TREATMENT (OUTPATIENT)
Dept: PHYSICAL THERAPY | Facility: CLINIC | Age: 39
End: 2024-05-29
Payer: OTHER GOVERNMENT

## 2024-05-29 DIAGNOSIS — M54.41 CHRONIC BILATERAL LOW BACK PAIN WITH RIGHT-SIDED SCIATICA: Primary | ICD-10-CM

## 2024-05-29 DIAGNOSIS — G89.29 CHRONIC BILATERAL LOW BACK PAIN WITH RIGHT-SIDED SCIATICA: Primary | ICD-10-CM

## 2024-05-29 DIAGNOSIS — M53.86 DECREASED ROM OF LUMBAR SPINE: ICD-10-CM

## 2024-05-29 PROCEDURE — 97140 MANUAL THERAPY 1/> REGIONS: CPT | Performed by: PHYSICAL THERAPIST

## 2024-05-29 PROCEDURE — 97110 THERAPEUTIC EXERCISES: CPT | Performed by: PHYSICAL THERAPIST

## 2024-05-29 PROCEDURE — G0283 ELEC STIM OTHER THAN WOUND: HCPCS | Performed by: PHYSICAL THERAPIST

## 2024-05-29 NOTE — PROGRESS NOTES
Physical Therapy Daily Treatment Note      Patient: Teo Griffith   : 1985  Referring practitioner: Alexis Sanchez MD  Date of Initial Visit: Type: THERAPY  Noted: 2023  Today's Date: 2024  Patient seen for 37 sessions       Visit Diagnoses:    ICD-10-CM ICD-9-CM   1. Chronic bilateral low back pain with right-sided sciatica  M54.41 724.2    G89.29 724.3     338.29   2. Decreased ROM of lumbar spine  M53.86 724.9       Subjective   Patient states that he is having 3/10 pain prior to treatment session. Patient reports that he is having electric pain down his right butt cheek and behind his right knee. Patient states that he is working on his home exercises.     Objective   See Exercise, Manual, and Modality Logs for complete treatment.       Assessment/Plan  Patient tolerated treatment session good with no increase in pain noted. Step height increased to 8inch with no increase in pain noted. Treatment session consisted of exercises to improve strength and ROM to the low back. No adverse reactions with treatment session or modalities. 2/10 pain in his low back following treatment session.     Plan: Continue per PT's POC, progress per the patient's tolerance.    Timed:         Manual Therapy:    15     mins  80848;     Therapeutic Exercise:    25     mins  51278;     Neuromuscular Ruth:        mins  55676;    Therapeutic Activity:          mins  59881;     Gait Training:           mins  07564;     Ultrasound:          mins  04388;    Ionto                                   mins   24355  Self Care                            mins   39456      Un-Timed:  Electrical Stimulation:    10     mins  73921 ( );  Dry Needling          mins self-pay  Traction          mins 65786  Canalith Repos         mins 27731    Timed Treatment:   40   mins   Total Treatment:     50   mins    Heather Morales PTA  [place] License: [id number]

## 2024-06-05 ENCOUNTER — TREATMENT (OUTPATIENT)
Dept: PHYSICAL THERAPY | Facility: CLINIC | Age: 39
End: 2024-06-05
Payer: OTHER GOVERNMENT

## 2024-06-05 DIAGNOSIS — G89.29 CHRONIC BILATERAL LOW BACK PAIN WITH RIGHT-SIDED SCIATICA: ICD-10-CM

## 2024-06-05 DIAGNOSIS — M53.86 DECREASED ROM OF LUMBAR SPINE: Primary | ICD-10-CM

## 2024-06-05 DIAGNOSIS — M54.41 CHRONIC BILATERAL LOW BACK PAIN WITH RIGHT-SIDED SCIATICA: ICD-10-CM

## 2024-06-05 DIAGNOSIS — Z98.890 S/P ARTHROSCOPY OF RIGHT SHOULDER: Primary | ICD-10-CM

## 2024-06-05 NOTE — PROGRESS NOTES
Physical Therapy Daily Treatment Note      Patient: Teo Griffith   : 1985  Referring practitioner: Alexis Sanchez MD  Date of Initial Visit: Type: THERAPY  Noted: 2023  Today's Date: 2024  Patient seen for 38 sessions       Visit Diagnoses:    ICD-10-CM ICD-9-CM   1. Decreased ROM of lumbar spine  M53.86 724.9   2. Chronic bilateral low back pain with right-sided sciatica  M54.41 724.2    G89.29 724.3     338.29       Subjective   Patient reports of having 6/10 pain in his low back. Patient states that he is continuing to have pain that runs down the back of his right leg.     Objective   See Exercise, Manual, and Modality Logs for complete treatment.       Assessment/Plan  Patient tolerated treatment session good wit rest breaks taken as needed by the patient. Resistance theraband increased to blue during SL clams, side stepping, and hip ext/abd. Educated patient to perform therex per his tolerance, patient verbalized understanding. No adverse reactions with modalities or treatment session. Treatment session consisted of exercises to improve low back ROM and LE strength.     Plan: Continue per PT's POC, progress per the patient's tolerance.    Timed:         Manual Therapy:    13     mins  83573;     Therapeutic Exercise:    30    mins  44256;     Neuromuscular Ruth:        mins  04550;    Therapeutic Activity:          mins  56429;     Gait Training:           mins  86661;     Ultrasound:          mins  22130;    Ionto                                   mins   74275  Self Care                            mins   91648      Un-Timed:  Electrical Stimulation:    10     mins  06397 ( );  Dry Needling          mins self-pay  Traction          mins 33775  Canalith Repos         mins 34724    Timed Treatment:   43   mins   Total Treatment:     53  mins    Heather Morales PTA  [place] License: [id number]

## 2024-06-05 NOTE — PROGRESS NOTES
Physical Therapy Daily Treatment Note      Patient: Teo Griffith   : 1985  Referring practitioner: Alexis Sanchez MD  Date of Initial Visit: Type: THERAPY  Noted: 2023  Today's Date: 2024  Patient seen for 64 sessions       Visit Diagnoses:    ICD-10-CM ICD-9-CM   1. S/P arthroscopy of right shoulder  Z98.890 V45.89       Subjective   Patient reports that he is having 3/10 pain in his right shoulder today. Patient states that his pain got up to a 5/10 this week. Patient reports that he is going to having surgery on (Friday) 6/15/24 on his right shoulder.    Objective   See Exercise, Manual, and Modality Logs for complete treatment.       Assessment/Plan  Patient tolerated session well with rest breaks taken as needed by the patient. Educated patient to perform therex per his tolerance, patient verbalized understanding. No adverse reactions with modalities or treatment session. Treatment session limited due to right shoulder surgery next week. Soreness noted following treatment session. Treatment session consisted of exercises to improve ROM and scapular strength. Pain remained the same from pre to post treatment session.    Plan: Continue per PT's POC, progress per tolerance.    Timed:         Manual Therapy:        mins  18596;     Therapeutic Exercise:    17     mins  17189;     Neuromuscular Ruth:        mins  18662;    Therapeutic Activity:          mins  71311;     Gait Training:           mins  18810;     Ultrasound:          mins  07098;    Ionto                                   mins   12587  Self Care                            mins   75392      Un-Timed:  Electrical Stimulation:    10     mins  23475 ( );  Dry Needling          mins self-pay  Traction          mins 54903  Canalith Repos         mins 79926    Timed Treatment:   17   mins   Total Treatment:     27   mins    Heather Morales PTA  [place] License: [id number]

## 2024-06-17 ENCOUNTER — TREATMENT (OUTPATIENT)
Dept: PHYSICAL THERAPY | Facility: CLINIC | Age: 39
End: 2024-06-17
Payer: OTHER GOVERNMENT

## 2024-06-17 ENCOUNTER — TRANSCRIBE ORDERS (OUTPATIENT)
Dept: PHYSICAL THERAPY | Facility: CLINIC | Age: 39
End: 2024-06-17
Payer: OTHER GOVERNMENT

## 2024-06-17 DIAGNOSIS — Z98.890 S/P ARTHROSCOPY OF RIGHT SHOULDER: Primary | ICD-10-CM

## 2024-06-17 DIAGNOSIS — M77.8 SUBSCAPULARIS TENDINITIS OF RIGHT SHOULDER: ICD-10-CM

## 2024-06-17 DIAGNOSIS — Z98.890 S/P RIGHT ROTATOR CUFF REPAIR: ICD-10-CM

## 2024-06-17 DIAGNOSIS — M25.611 DECREASED ROM OF RIGHT SHOULDER: ICD-10-CM

## 2024-06-17 DIAGNOSIS — M75.21 BICEPS TENDONITIS ON RIGHT: Primary | ICD-10-CM

## 2024-06-17 NOTE — PROGRESS NOTES
Physical Therapy Re Certification Of Plan of Care  Patient: Teo Griffith   : 1985  Diagnosis/ICD-10 Code:  S/P arthroscopy of right shoulder [Z98.890]  Referring practitioner: Gregorio Thomas DO  Date of Initial Visit: Type: THERAPY  Noted: 2023  Today's Date: 2024  Patient seen for 65 sessions         Visit Diagnoses:    ICD-10-CM ICD-9-CM   1. S/P arthroscopy of right shoulder  Z98.890 V45.89   2. Decreased ROM of right shoulder  M25.611 719.51         Teo rGiffith reports: He underwent surgery on 2024 for a subscapularis repair and biceps tenodesis.  Patient notes that he was instructed to wear the sling and resume therapy today.  He notes that his shoulder is very sore today.  Patient's instructions from MD report for bandaging to be removed by PT at today's session.  Subjective Questionnaire: QuickDASH: 79.55% impaired        Subjective Evaluation    History of Present Illness  Date of surgery: 2024    Pain  Current pain ratin  At best pain ratin  At worst pain rating: 10    Hand dominance: right           Objective          Observations     Right Shoulder  Positive for incision. Negative for drainage.     Additional Shoulder Observation Details  Bandaging removed, per MD's instructions.  No drainage, warmth, or increased redness noted at incision sites.  Bruising observed at biceps.  Surgical sponges and paper tape reapplied over incisions.      Palpation     Right   Muscle spasm in the levator scapulae and upper trapezius. Tenderness of the biceps, levator scapulae, subscapularis, supraspinatus and upper trapezius.     Tenderness     Right Shoulder  Tenderness in the biceps tendon (proximal) and subscapularis tendon.     Passive Range of Motion     Right Shoulder   Flexion: 73 degrees with pain  Abduction: 60 degrees with pain    Strength/Myotome Testing     Left Wrist/Hand      (2nd hand position)     Trial 1: 100 lbs    Trial 2: 105 lbs    Trial 3: 100 lbs     Average: 101.67 lbs    Right Wrist/Hand      (2nd hand position)     Trial 1: 40 lbs    Trial 2: 47 lbs    Trial 3: 45 lbs    Average: 44 lbs          Assessment & Plan       Assessment  Impairments: abnormal muscle firing, abnormal or restricted ROM, activity intolerance, impaired physical strength, lacks appropriate home exercise program, pain with function and safety issue   Functional limitations: carrying objects, lifting, sleeping, pulling, pushing, uncomfortable because of pain, stooping, reaching behind back and reaching overhead   Assessment details: Re-evaluation completed at today's session, secondary to patient undergoing surgery on 6/14/2024 for subscapular repair and biceps tenodesis.  Patient presents with increased pain, decreased right shoulder ROM, and decreased right UE strength.  Patient currently reports a 79.55% functional mobility impairment, based on his response to the QuickDash.  Goals were revised at today's session, due to patient undergoing surgery.  He will benefit from skilled PT, so that he can achieve his maximum level of function.    Prognosis: good    Goals  Plan Goals: STGs 6 weeks  1. Patient will be independent compliant with HEP.  2. Patient will demonstrate at least 120 degrees right shoulder flexion and scaption PROM to allow for improved ease with ADLs.  3. Patient will report pain no greater than 6/10 when performing self-care activities.  4. Patient will display at least 75# right  strength to allow for improved ease with opening jars and carrying objects.      LTG 12 weeks  1. Patient will report 30% impairment or less on the QuickDash to show improved functional mobility.  2. Patient will demonstrate at least 4/5 right shoulder strength to allow for improved ease with lifting.  Ongoing, progressing  3. Patient will demonstrate at least 150 degrees of AROM flexion/scaption and 60 degrees AROM ER/IR to allow for improved ease with daily tasks.  4. Patient will  report worst pain no greater than 4/10 to allow for improved quality of life.      Plan  Therapy options: will be seen for skilled therapy services  Planned modality interventions: cryotherapy, TENS, thermotherapy (hydrocollator packs) and ultrasound  Planned therapy interventions: abdominal trunk stabilization, ADL retraining, body mechanics training, flexibility, functional ROM exercises, home exercise program, IADL retraining, joint mobilization, manual therapy, neuromuscular re-education, postural training, soft tissue mobilization, strengthening, stretching and therapeutic activities  Frequency: 3x week  Duration in weeks: 12  Treatment plan discussed with: patient  Plan details: Re-evaluation   56753  Therapeutic exercise  14855  Therapeutic activity    37321  Neuromuscular re-education   31971  Manual therapy   08331    Unattended e-stim (Medicaid/Medicare)     Moist heat/cryotherapy 58537   Ultrasound   63605         Recommendations: Continue as planned  Timeframe: 3 months  Prognosis to achieve goals: good      Timed:         Manual Therapy:         mins  87845;     Therapeutic Exercise:    17     mins  18383;     Neuromuscular Ruth:        mins  99109;    Therapeutic Activity:          mins  53129;     Gait Training:           mins  60227;     Ultrasound:          mins  93915;    Ionto                                   mins   38820  Self Care                            mins   66445    Un-Timed:  Electrical Stimulation:    10     mins  40458 ( );  Dry Needling          mins self-pay  Traction          mins 74008  Re-Eval                           20    mins  95387  Canalith Repos         mins 11676    Timed Treatment:   17   mins   Total Treatment:     47   mins          PT: Jacqui Desai PT     [place] License:  [id number]    Electronically signed by Jacqui Desai PT, 06/17/24, 9:31 AM EDT    Certification Period: 6/17/2024 thru 9/14/2024  I certify that the therapy services are furnished  while this patient is under my care.  The services outlined above are required by this patient, and will be reviewed every 90 days.         Physician Signature:__________________________________________________    PHYSICIAN: Gregorio Thomas DO  NPI:                                       DATE:  :     Please sign and return via fax to [email or web address] . Thank you, [facility]

## 2024-06-20 ENCOUNTER — TREATMENT (OUTPATIENT)
Dept: PHYSICAL THERAPY | Facility: CLINIC | Age: 39
End: 2024-06-20
Payer: OTHER GOVERNMENT

## 2024-06-20 DIAGNOSIS — Z98.890 S/P ARTHROSCOPY OF RIGHT SHOULDER: Primary | ICD-10-CM

## 2024-06-20 DIAGNOSIS — Z98.890 S/P RIGHT ROTATOR CUFF REPAIR: ICD-10-CM

## 2024-06-20 DIAGNOSIS — M25.611 DECREASED ROM OF RIGHT SHOULDER: ICD-10-CM

## 2024-06-20 NOTE — PROGRESS NOTES
Physical Therapy Daily Treatment Note      Patient: Teo Griffith   : 1985  Referring practitioner: Gregorio Thomas DO  Date of Initial Visit: Type: THERAPY  Noted: 2023  Today's Date: 2024  Patient seen for 66 sessions       Visit Diagnoses:    ICD-10-CM ICD-9-CM   1. S/P arthroscopy of right shoulder  Z98.890 V45.89   2. Decreased ROM of right shoulder  M25.611 719.51   3. S/P right rotator cuff repair  Z98.890 V45.89       Subjective Evaluation    History of Present Illness    Subjective comment: Pt reports 6/10 right shoulder pain prior to today's session.Pain  Current pain ratin         Objective   See Exercise, Manual, and Modality Logs for complete treatment.       Assessment & Plan       Assessment  Assessment details: Therapeutic exercises were performed during today's session for improved right  strength, scapular stability, and ROM. PROM was performed on the right elbow, with PT performing PROM on the right shoulder into flexion/scaption at less than 90 degrees. The patient reported no increase in right shoulder pain with PROM. Today's session concluded with cryotherapy combined with pre-mod to the right UT, addressing pain and inflammation, with no skin irritation observed. The patient reported 4/10 right shoulder pain following today's session.    Plan  Plan details: Progress as indicated, per protocol, to achieve max function.        Timed:         Manual Therapy:         mins  61058;     Therapeutic Exercise:    30     mins  51170;     Neuromuscular Ruth:        mins  26059;    Therapeutic Activity:          mins  97847;     Gait Training:           mins  06444;     Ultrasound:          mins  85475;    Ionto                                   mins   34064  Self Care                            mins   93044      Un-Timed:  Electrical Stimulation:    10     mins  22476 ( );  Dry Needling          mins self-pay  Traction          mins 90442  Canalith Repos         mins  44149    Timed Treatment:   30   mins   Total Treatment:     40   mins    Ashley Claudene Dalton, PT  [place] License: [id number]

## 2024-06-24 ENCOUNTER — TREATMENT (OUTPATIENT)
Dept: PHYSICAL THERAPY | Facility: CLINIC | Age: 39
End: 2024-06-24
Payer: OTHER GOVERNMENT

## 2024-06-24 DIAGNOSIS — M25.611 DECREASED ROM OF RIGHT SHOULDER: ICD-10-CM

## 2024-06-24 DIAGNOSIS — Z98.890 S/P RIGHT ROTATOR CUFF REPAIR: ICD-10-CM

## 2024-06-24 DIAGNOSIS — Z98.890 S/P ARTHROSCOPY OF RIGHT SHOULDER: Primary | ICD-10-CM

## 2024-06-24 PROCEDURE — 97110 THERAPEUTIC EXERCISES: CPT | Performed by: PHYSICAL THERAPIST

## 2024-06-24 PROCEDURE — G0283 ELEC STIM OTHER THAN WOUND: HCPCS | Performed by: PHYSICAL THERAPIST

## 2024-06-24 NOTE — PROGRESS NOTES
"Physical Therapy Daily Treatment Note      Patient: Teo Griffith   : 1985  Referring practitioner: Gregorio Thomas DO  Date of Initial Visit: Type: THERAPY  Noted: 2023  Today's Date: 2024  Patient seen for 67 sessions       Visit Diagnoses:    ICD-10-CM ICD-9-CM   1. S/P arthroscopy of right shoulder  Z98.890 V45.89   2. Decreased ROM of right shoulder  M25.611 719.51   3. S/P right rotator cuff repair  Z98.890 V45.89       Subjective Evaluation    History of Present Illness    Subjective comment: Patient reports 5/10 pain today, noting that he thinks his shoulder has been sore from sleeping.  Patient has follow-up appt with MD today.  He notes that he has noticed some swelling in the right arm near the biceps, but mentions that he has not been applying ice at that area.Pain  Current pain ratin           Objective   See Exercise, Manual, and Modality Logs for complete treatment.       Assessment & Plan       Assessment  Assessment details: Patient assessed prior to treatment, due to subjective reports.  Edema noted at right UE, but patient denied increased tenderness with palpation.  No increased redness/warmth noted.  Patient educated to notify MD at his appt today of increased swelling at the right UE and to begin applying ice at that portion of his arm, with patient verbalizing understanding.    Patient tolerated today's session well, noting 4/10 pain post-tx.  There ex progressed to include increased repetitions.  Patient noted \"tightness\" at the right UT, when performing UT stretch to the left.  PROM was performed per patient's tolerance and within protocol limits.  Session concluded with ice/ESTIM, with no skin irritation following.  He will continue to be progressed per his toelrance and POC.          Timed:         Manual Therapy:         mins  28261;     Therapeutic Exercise:    35     mins  18489;     Neuromuscular Ruth:        mins  07021;    Therapeutic Activity:          mins  " 09373;     Gait Training:           mins  96494;     Ultrasound:          mins  34764;    Ionto                                   mins   32600  Self Care                            mins   03995      Un-Timed:  Electrical Stimulation:    10     mins  02527 ( );  Dry Needling          mins self-pay  Traction          mins 44712  Canalith Repos         mins 57552    Timed Treatment:   35   mins   Total Treatment:     45   mins    Jacqui Desai, AXEL  [place] License: [id number]  Electronically signed by Jacqui Desai PT, 06/24/24, 1:29 PM EDT.

## 2024-06-26 ENCOUNTER — TREATMENT (OUTPATIENT)
Dept: PHYSICAL THERAPY | Facility: CLINIC | Age: 39
End: 2024-06-26
Payer: OTHER GOVERNMENT

## 2024-06-26 DIAGNOSIS — M25.611 DECREASED ROM OF RIGHT SHOULDER: ICD-10-CM

## 2024-06-26 DIAGNOSIS — Z98.890 S/P ARTHROSCOPY OF RIGHT SHOULDER: Primary | ICD-10-CM

## 2024-06-26 DIAGNOSIS — Z98.890 S/P RIGHT ROTATOR CUFF REPAIR: ICD-10-CM

## 2024-06-26 NOTE — PROGRESS NOTES
Physical Therapy Daily Treatment Note      Patient: Teo Griffith   : 1985  Referring practitioner: Gregorio Thomas DO  Date of Initial Visit: Type: THERAPY  Noted: 2023  Today's Date: 2024  Patient seen for 68 sessions       Visit Diagnoses:    ICD-10-CM ICD-9-CM   1. S/P arthroscopy of right shoulder  Z98.890 V45.89   2. Decreased ROM of right shoulder  M25.611 719.51   3. S/P right rotator cuff repair  Z98.890 V45.89       Subjective Evaluation    History of Present Illness    Subjective comment: Pt has 5/10 pain today.     Objective   See Exercise, Manual, and Modality Logs for complete treatment.       Assessment & Plan       Assessment  Assessment details: Tx today consisted of exercises for improved postural stability and shoulder mobility; prom to shld and ended with ice and estim for decreased pain.  Pt demonstrated good effort today with no complaints.      Plan  Plan details: Will follow progressing shoulder mobility per protocol.        Timed:         Manual Therapy:         mins  60362;     Therapeutic Exercise:     28    mins  18964;     Neuromuscular Ruth:        mins  71666;    Therapeutic Activity:          mins  22620;     Gait Training:           mins  61344;     Ultrasound:          mins  97721;    Ionto                                   mins   61840  Self Care                            mins   15261  Canalith Repos         mins 24709      Un-Timed:  Electrical Stimulation:    10     mins  87942 ( );  Dry Needling          mins self-pay  Traction          mins 53623      Timed Treatment:   28   mins   Total Treatment:     38   mins    Tim Jackson PT  [place] License: [id number]      Electronically signed by Tim Jackson PT, 24, 11:03 AM EDT

## 2024-07-08 ENCOUNTER — TREATMENT (OUTPATIENT)
Dept: PHYSICAL THERAPY | Facility: CLINIC | Age: 39
End: 2024-07-08
Payer: OTHER GOVERNMENT

## 2024-07-08 DIAGNOSIS — Z98.890 S/P ARTHROSCOPY OF RIGHT SHOULDER: Primary | ICD-10-CM

## 2024-07-08 DIAGNOSIS — M25.611 DECREASED ROM OF RIGHT SHOULDER: ICD-10-CM

## 2024-07-08 DIAGNOSIS — Z98.890 S/P RIGHT ROTATOR CUFF REPAIR: ICD-10-CM

## 2024-07-08 NOTE — PROGRESS NOTES
Physical Therapy Daily Treatment Note      Patient: Teo Griffith   : 1985  Referring practitioner: Gregorio Thomas DO  Date of Initial Visit: Type: THERAPY  Noted: 2023  Today's Date: 2024  Patient seen for 69 sessions       Visit Diagnoses:    ICD-10-CM ICD-9-CM   1. S/P arthroscopy of right shoulder  Z98.890 V45.89   2. Decreased ROM of right shoulder  M25.611 719.51   3. S/P right rotator cuff repair  Z98.890 V45.89       Subjective Evaluation    History of Present Illness    Subjective comment: Patient reports 3/10 pain today.Pain  Current pain rating: 3         Objective   See Exercise, Manual, and Modality Logs for complete treatment.       Assessment & Plan       Assessment  Assessment details: Therapy session consisted of there ex, followed by ice/ESTIM.  No adverse reactions were noted with modalities.  There ex progressed to include the addition of sternal lifts, lawnmowers, shoulder isometrics, and walkaways.  Patient reported slight discomfort with shoulder isometrics, but noted that it eased with rest.  He reported no increase in pain post-tx.  He will continue to be progressed per his tolerance and POC.      Timed:         Manual Therapy:         mins  17713;     Therapeutic Exercise:    40     mins  02662;     Neuromuscular Ruth:        mins  76535;    Therapeutic Activity:          mins  84479;     Gait Training:           mins  48934;     Ultrasound:          mins  32194;    Ionto                                   mins   62156  Self Care                            mins   49038      Un-Timed:  Electrical Stimulation:    10     mins  33318 ( );  Dry Needling          mins self-pay  Traction          mins 86257  Canalith Repos         mins 17577    Timed Treatment:   40   mins   Total Treatment:     50   mins    Jacqui Desai PT  [place] License: [id number]  Electronically signed by Jacqui Desai PT, 24, 2:22 PM EDT.